# Patient Record
Sex: FEMALE | Race: WHITE | Employment: FULL TIME | URBAN - METROPOLITAN AREA
[De-identification: names, ages, dates, MRNs, and addresses within clinical notes are randomized per-mention and may not be internally consistent; named-entity substitution may affect disease eponyms.]

---

## 2021-02-08 ENCOUNTER — HOSPITAL ENCOUNTER (OUTPATIENT)
Dept: GENERAL RADIOLOGY | Age: 61
Discharge: HOME | End: 2021-02-08
Attending: ORTHOPAEDIC SURGERY
Payer: OTHER MISCELLANEOUS

## 2021-02-08 DIAGNOSIS — Z98.890 HISTORY OF OPEN REDUCTION AND INTERNAL FIXATION (ORIF) PROCEDURE: ICD-10-CM

## 2021-02-08 PROCEDURE — 73610 X-RAY EXAM OF ANKLE: CPT | Performed by: ORTHOPAEDIC SURGERY

## 2021-03-22 ENCOUNTER — HOSPITAL ENCOUNTER (OUTPATIENT)
Dept: GENERAL RADIOLOGY | Age: 61
Discharge: HOME | End: 2021-03-22
Attending: ORTHOPAEDIC SURGERY
Payer: OTHER MISCELLANEOUS

## 2021-03-22 DIAGNOSIS — M79.671 FOOT PAIN, RIGHT: ICD-10-CM

## 2021-03-22 PROCEDURE — 73610 X-RAY EXAM OF ANKLE: CPT | Performed by: ORTHOPAEDIC SURGERY

## 2024-07-18 ENCOUNTER — HOSPITAL ENCOUNTER (INPATIENT)
Facility: HOSPITAL | Age: 64
LOS: 4 days | Discharge: HOME | End: 2024-07-22
Attending: EMERGENCY MEDICINE | Admitting: HOSPITALIST
Payer: MEDICARE

## 2024-07-18 ENCOUNTER — APPOINTMENT (OUTPATIENT)
Dept: CT IMAGING | Age: 64
End: 2024-07-18
Attending: EMERGENCY MEDICINE
Payer: MEDICARE

## 2024-07-18 DIAGNOSIS — N93.9 VAGINAL BLEEDING: ICD-10-CM

## 2024-07-18 DIAGNOSIS — R10.9 ABDOMINAL PAIN OF UNKNOWN ETIOLOGY: ICD-10-CM

## 2024-07-18 DIAGNOSIS — D49.59 UTERINE NEOPLASM: ICD-10-CM

## 2024-07-18 DIAGNOSIS — R10.9 ABDOMINAL PAIN: ICD-10-CM

## 2024-07-18 DIAGNOSIS — N94.89 ENDOMETRIAL MASS: Primary | ICD-10-CM

## 2024-07-18 PROBLEM — N30.00 ACUTE CYSTITIS WITHOUT HEMATURIA: Status: ACTIVE | Noted: 2024-07-18

## 2024-07-18 PROBLEM — E87.1 HYPONATREMIA: Status: ACTIVE | Noted: 2024-07-18

## 2024-07-18 PROBLEM — R73.9 HYPERGLYCEMIA: Status: ACTIVE | Noted: 2024-07-18

## 2024-07-18 LAB
ALBUMIN SERPL-MCNC: 4.1 G/DL (ref 3.4–5)
ALBUMIN/GLOB SERPL: 1.1 {RATIO} (ref 1–2)
ALP LIVER SERPL-CCNC: 114 U/L
ALT SERPL-CCNC: 27 U/L
ANION GAP SERPL CALC-SCNC: 4 MMOL/L (ref 0–18)
ANTIBODY SCREEN: NEGATIVE
AST SERPL-CCNC: 25 U/L (ref 15–37)
BASOPHILS # BLD AUTO: 0.05 X10(3) UL (ref 0–0.2)
BASOPHILS NFR BLD AUTO: 0.6 %
BILIRUB SERPL-MCNC: 0.5 MG/DL (ref 0.1–2)
BILIRUB UR QL CFM: NEGATIVE
BUN BLD-MCNC: 15 MG/DL (ref 9–23)
CALCIUM BLD-MCNC: 9.5 MG/DL (ref 8.5–10.1)
CANCER AG125 SERPL-ACNC: 25 U/ML (ref ?–30.2)
CHLORIDE SERPL-SCNC: 105 MMOL/L (ref 98–112)
CO2 SERPL-SCNC: 25 MMOL/L (ref 21–32)
CREAT BLD-MCNC: 0.81 MG/DL
EGFRCR SERPLBLD CKD-EPI 2021: 81 ML/MIN/1.73M2 (ref 60–?)
EOSINOPHIL # BLD AUTO: 0.08 X10(3) UL (ref 0–0.7)
EOSINOPHIL NFR BLD AUTO: 1 %
ERYTHROCYTE [DISTWIDTH] IN BLOOD BY AUTOMATED COUNT: 13.2 %
EST. AVERAGE GLUCOSE BLD GHB EST-MCNC: 143 MG/DL (ref 68–126)
GLOBULIN PLAS-MCNC: 3.8 G/DL (ref 2.8–4.4)
GLUCOSE BLD-MCNC: 141 MG/DL (ref 70–99)
GLUCOSE BLD-MCNC: 215 MG/DL (ref 70–99)
GLUCOSE UR STRIP.AUTO-MCNC: NEGATIVE MG/DL
HBA1C MFR BLD: 6.6 % (ref ?–5.7)
HCT VFR BLD AUTO: 43.7 %
HGB BLD-MCNC: 14.2 G/DL
IMM GRANULOCYTES # BLD AUTO: 0.03 X10(3) UL (ref 0–1)
IMM GRANULOCYTES NFR BLD: 0.4 %
LEUKOCYTE ESTERASE UR QL STRIP.AUTO: NEGATIVE
LYMPHOCYTES # BLD AUTO: 1.8 X10(3) UL (ref 1–4)
LYMPHOCYTES NFR BLD AUTO: 22.1 %
MCH RBC QN AUTO: 29.3 PG (ref 26–34)
MCHC RBC AUTO-ENTMCNC: 32.5 G/DL (ref 31–37)
MCV RBC AUTO: 90.3 FL
MONOCYTES # BLD AUTO: 0.64 X10(3) UL (ref 0.1–1)
MONOCYTES NFR BLD AUTO: 7.8 %
NEUTROPHILS # BLD AUTO: 5.56 X10 (3) UL (ref 1.5–7.7)
NEUTROPHILS # BLD AUTO: 5.56 X10(3) UL (ref 1.5–7.7)
NEUTROPHILS NFR BLD AUTO: 68.1 %
NITRITE UR QL STRIP.AUTO: POSITIVE
OSMOLALITY SERPL CALC.SUM OF ELEC: 281 MOSM/KG (ref 275–295)
PH UR STRIP.AUTO: 5 [PH] (ref 5–8)
PLATELET # BLD AUTO: 314 10(3)UL (ref 150–450)
POTASSIUM SERPL-SCNC: 4.2 MMOL/L (ref 3.5–5.1)
PROT SERPL-MCNC: 7.9 G/DL (ref 6.4–8.2)
PROT UR STRIP.AUTO-MCNC: >=300 MG/DL
RBC # BLD AUTO: 4.84 X10(6)UL
RBC #/AREA URNS AUTO: >10 /HPF
RH BLOOD TYPE: POSITIVE
RH BLOOD TYPE: POSITIVE
SODIUM SERPL-SCNC: 134 MMOL/L (ref 136–145)
SP GR UR STRIP.AUTO: 1.02 (ref 1–1.03)
UROBILINOGEN UR STRIP.AUTO-MCNC: 1 MG/DL
WBC # BLD AUTO: 8.2 X10(3) UL (ref 4–11)

## 2024-07-18 PROCEDURE — 99223 1ST HOSP IP/OBS HIGH 75: CPT | Performed by: HOSPITALIST

## 2024-07-18 PROCEDURE — 74177 CT ABD & PELVIS W/CONTRAST: CPT | Performed by: EMERGENCY MEDICINE

## 2024-07-18 RX ORDER — CARVEDILOL 3.12 MG/1
3.12 TABLET ORAL 2 TIMES DAILY WITH MEALS
COMMUNITY

## 2024-07-18 RX ORDER — HYDROMORPHONE HYDROCHLORIDE 1 MG/ML
0.4 INJECTION, SOLUTION INTRAMUSCULAR; INTRAVENOUS; SUBCUTANEOUS EVERY 2 HOUR PRN
Status: DISCONTINUED | OUTPATIENT
Start: 2024-07-18 | End: 2024-07-18

## 2024-07-18 RX ORDER — NICOTINE POLACRILEX 4 MG
30 LOZENGE BUCCAL
Status: DISCONTINUED | OUTPATIENT
Start: 2024-07-18 | End: 2024-07-22

## 2024-07-18 RX ORDER — DEXTROSE MONOHYDRATE 25 G/50ML
50 INJECTION, SOLUTION INTRAVENOUS
Status: DISCONTINUED | OUTPATIENT
Start: 2024-07-18 | End: 2024-07-22

## 2024-07-18 RX ORDER — HYDROMORPHONE HYDROCHLORIDE 1 MG/ML
0.5 INJECTION, SOLUTION INTRAMUSCULAR; INTRAVENOUS; SUBCUTANEOUS EVERY 30 MIN PRN
Status: DISCONTINUED | OUTPATIENT
Start: 2024-07-18 | End: 2024-07-18 | Stop reason: ALTCHOICE

## 2024-07-18 RX ORDER — HYDROMORPHONE HYDROCHLORIDE 1 MG/ML
0.8 INJECTION, SOLUTION INTRAMUSCULAR; INTRAVENOUS; SUBCUTANEOUS EVERY 2 HOUR PRN
Status: DISCONTINUED | OUTPATIENT
Start: 2024-07-18 | End: 2024-07-22

## 2024-07-18 RX ORDER — HYDROMORPHONE HYDROCHLORIDE 1 MG/ML
0.2 INJECTION, SOLUTION INTRAMUSCULAR; INTRAVENOUS; SUBCUTANEOUS EVERY 2 HOUR PRN
Status: DISCONTINUED | OUTPATIENT
Start: 2024-07-18 | End: 2024-07-18

## 2024-07-18 RX ORDER — LOSARTAN POTASSIUM 25 MG/1
25 TABLET ORAL DAILY
Status: DISCONTINUED | OUTPATIENT
Start: 2024-07-19 | End: 2024-07-22

## 2024-07-18 RX ORDER — HYDROMORPHONE HYDROCHLORIDE 1 MG/ML
0.8 INJECTION, SOLUTION INTRAMUSCULAR; INTRAVENOUS; SUBCUTANEOUS EVERY 2 HOUR PRN
Status: DISCONTINUED | OUTPATIENT
Start: 2024-07-18 | End: 2024-07-18

## 2024-07-18 RX ORDER — BENZONATATE 100 MG/1
200 CAPSULE ORAL 3 TIMES DAILY PRN
Status: DISCONTINUED | OUTPATIENT
Start: 2024-07-18 | End: 2024-07-22

## 2024-07-18 RX ORDER — CARVEDILOL 3.12 MG/1
3.12 TABLET ORAL 2 TIMES DAILY WITH MEALS
Status: DISCONTINUED | OUTPATIENT
Start: 2024-07-19 | End: 2024-07-22

## 2024-07-18 RX ORDER — MELATONIN
3 NIGHTLY PRN
Status: DISCONTINUED | OUTPATIENT
Start: 2024-07-18 | End: 2024-07-22

## 2024-07-18 RX ORDER — SENNOSIDES 8.6 MG
17.2 TABLET ORAL NIGHTLY PRN
Status: DISCONTINUED | OUTPATIENT
Start: 2024-07-18 | End: 2024-07-22

## 2024-07-18 RX ORDER — SODIUM CHLORIDE 9 MG/ML
INJECTION, SOLUTION INTRAVENOUS CONTINUOUS
Status: ACTIVE | OUTPATIENT
Start: 2024-07-18 | End: 2024-07-18

## 2024-07-18 RX ORDER — ASPIRIN 81 MG/1
81 TABLET, CHEWABLE ORAL DAILY
COMMUNITY

## 2024-07-18 RX ORDER — SODIUM CHLORIDE 9 MG/ML
INJECTION, SOLUTION INTRAVENOUS CONTINUOUS
Status: DISCONTINUED | OUTPATIENT
Start: 2024-07-19 | End: 2024-07-22

## 2024-07-18 RX ORDER — BISACODYL 10 MG
10 SUPPOSITORY, RECTAL RECTAL
Status: DISCONTINUED | OUTPATIENT
Start: 2024-07-18 | End: 2024-07-22

## 2024-07-18 RX ORDER — ONDANSETRON 2 MG/ML
4 INJECTION INTRAMUSCULAR; INTRAVENOUS ONCE
Status: COMPLETED | OUTPATIENT
Start: 2024-07-18 | End: 2024-07-18

## 2024-07-18 RX ORDER — LOSARTAN POTASSIUM 25 MG/1
25 TABLET ORAL DAILY
COMMUNITY

## 2024-07-18 RX ORDER — HYDROCODONE BITARTRATE AND ACETAMINOPHEN 5; 325 MG/1; MG/1
1 TABLET ORAL EVERY 4 HOURS PRN
Status: DISCONTINUED | OUTPATIENT
Start: 2024-07-18 | End: 2024-07-20

## 2024-07-18 RX ORDER — ONDANSETRON 2 MG/ML
4 INJECTION INTRAMUSCULAR; INTRAVENOUS EVERY 6 HOURS PRN
Status: DISCONTINUED | OUTPATIENT
Start: 2024-07-18 | End: 2024-07-22

## 2024-07-18 RX ORDER — HYDROMORPHONE HYDROCHLORIDE 1 MG/ML
0.4 INJECTION, SOLUTION INTRAMUSCULAR; INTRAVENOUS; SUBCUTANEOUS EVERY 2 HOUR PRN
Status: DISCONTINUED | OUTPATIENT
Start: 2024-07-18 | End: 2024-07-22

## 2024-07-18 RX ORDER — NICOTINE POLACRILEX 4 MG
15 LOZENGE BUCCAL
Status: DISCONTINUED | OUTPATIENT
Start: 2024-07-18 | End: 2024-07-22

## 2024-07-18 RX ORDER — HYDROMORPHONE HYDROCHLORIDE 1 MG/ML
0.2 INJECTION, SOLUTION INTRAMUSCULAR; INTRAVENOUS; SUBCUTANEOUS EVERY 2 HOUR PRN
Status: DISCONTINUED | OUTPATIENT
Start: 2024-07-18 | End: 2024-07-22

## 2024-07-18 RX ORDER — ACETAMINOPHEN 325 MG/1
650 TABLET ORAL EVERY 4 HOURS PRN
Status: DISCONTINUED | OUTPATIENT
Start: 2024-07-18 | End: 2024-07-20

## 2024-07-18 RX ORDER — HYDROMORPHONE HYDROCHLORIDE 1 MG/ML
0.5 INJECTION, SOLUTION INTRAMUSCULAR; INTRAVENOUS; SUBCUTANEOUS EVERY 30 MIN PRN
Status: DISCONTINUED | OUTPATIENT
Start: 2024-07-18 | End: 2024-07-21

## 2024-07-18 RX ORDER — POLYETHYLENE GLYCOL 3350 17 G/17G
17 POWDER, FOR SOLUTION ORAL DAILY PRN
Status: DISCONTINUED | OUTPATIENT
Start: 2024-07-18 | End: 2024-07-22

## 2024-07-18 RX ORDER — ACETAMINOPHEN 500 MG
500 TABLET ORAL EVERY 4 HOURS PRN
Status: DISCONTINUED | OUTPATIENT
Start: 2024-07-18 | End: 2024-07-20

## 2024-07-18 RX ORDER — ONDANSETRON 2 MG/ML
4 INJECTION INTRAMUSCULAR; INTRAVENOUS EVERY 4 HOURS PRN
Status: DISCONTINUED | OUTPATIENT
Start: 2024-07-18 | End: 2024-07-18 | Stop reason: ALTCHOICE

## 2024-07-18 RX ORDER — ECHINACEA PURPUREA EXTRACT 125 MG
1 TABLET ORAL
Status: DISCONTINUED | OUTPATIENT
Start: 2024-07-18 | End: 2024-07-22

## 2024-07-18 RX ORDER — HYDROCODONE BITARTRATE AND ACETAMINOPHEN 5; 325 MG/1; MG/1
2 TABLET ORAL EVERY 4 HOURS PRN
Status: DISCONTINUED | OUTPATIENT
Start: 2024-07-18 | End: 2024-07-20

## 2024-07-18 NOTE — H&P
[facility]     Rosario Benitez Patient Status:  Inpatient    10/2/1960 MRN [id number]   Location [facility] 7NE-A Attending Darrick Jonas MD   Hosp Day # 0 PCP Petar Campbell MD     Chief Complaint: vaginal bleeding    Subjective:    History of Present Illness:     Rosario Benitez is a 63 year old female with several days (since Monday) of vaginal bleeding with clots.  Also has had itching, urinary frequency, urinary pressure, and urgency.  No fevers or chills.  Was having lower pelvic pain too but it's now resolved.  Transferred here from [facility].    History/Other:    Past Medical History:  Past Medical History:    Diabetes (HCC)    Essential hypertension    Heart attack (HCC)     Past Surgical History:   Past Surgical History:   Procedure Laterality Date    Anesth,radical leg bone surgery        Family History:   No family history on file.    hypertension Social History:    reports that she has quit smoking. Her smoking use included cigarettes. She has never used smokeless tobacco. She reports current alcohol use. She reports that she does not use drugs.     Allergies: No Known Allergies    Medications:    No current facility-administered medications on file prior to encounter.     Current Outpatient Medications on File Prior to Encounter   Medication Sig Dispense Refill    metFORMIN 500 MG Oral Tab Take 1 tablet (500 mg total) by mouth 2 (two) times daily with meals.      carvedilol 3.125 MG Oral Tab Take 1 tablet (3.125 mg total) by mouth 2 (two) times daily with meals.      losartan 25 MG Oral Tab Take 1 tablet (25 mg total) by mouth daily.         Review of Systems:   A comprehensive 12 point review of systems was completed.    Pertinent positives and negatives noted in the HPI.    Objective:   Physical Exam:    /67   Pulse 71   Temp 97.6 °F (36.4 °C)   Resp 12   Ht 5' 2\" (1.575 m)   Wt 155 lb (70.3 kg)   SpO2 98%   BMI 28.35 kg/m²   General: No  acute distress, Alert  Respiratory: No rhonchi, no wheezes  Cardiovascular: S1, S2. Regular rate and rhythm  Abdomen: Soft, NT/ND, +BS  Neuro: No new focal deficits  Extremities: No edema      Results:    Labs:      Labs Last 24 Hours:    Recent Labs   Lab 07/18/24  1154   RBC 4.84   HGB 14.2   HCT 43.7   MCV 90.3   MCH 29.3   MCHC 32.5   RDW 13.2   NEPRELIM 5.56   WBC 8.2   .0       Recent Labs   Lab 07/18/24  1154   *   BUN 15   CREATSERUM 0.81   EGFRCR 81   CA 9.5   ALB 4.1   *   K 4.2      CO2 25.0   ALKPHO 114   AST 25   ALT 27   BILT 0.5   TP 7.9       No results found for: \"PT\", \"INR\"    No results for input(s): \"TROP\", \"TROPHS\", \"CK\" in the last 168 hours.    No results for input(s): \"TROP\", \"PBNP\" in the last 168 hours.    No results for input(s): \"PCT\" in the last 168 hours.    Imaging: Imaging data reviewed in Epic.    Assessment & Plan:      #vaginal bleeding 2/2 pelvic mass; dr. Chavarria/ladan on consult  #UTI-empiric abx; monitor cultures  #mild hyponatremia-monitor  #CAD with hx MI but has no stents  #DMII-monitor on insulin  #HTN    Will do med rec once review complete.    Quality:  DVT prophylaxis:  SCDs  Code Status: see chart  Peterson: NO  Peterson Duration (in days): N/A  Central line: NO  Estimated discharge date: tbd    Plan of care discussed with patient and ER MD Darrick Mancini MD    Supplementary Documentation:

## 2024-07-18 NOTE — ED QUICK NOTES
Orders for admission, patient is aware of plan and ready to go upstairs. Any questions, please call ED RN Jennifer at extension [phone].     Patient Covid vaccination status: Fully vaccinated     COVID Test Ordered in ED: None    COVID Suspicion at Admission: N/A    Running Infusions:      Mental Status/LOC at time of transport: a&oX3    Other pertinent information:   CIWA score: N/A   NIH score:  N/A

## 2024-07-18 NOTE — ED INITIAL ASSESSMENT (HPI)
Right sided abd pain with vaginal bleeding onset 2 days ago, today started heavy bleeding with passing of clots and states is now wearing a diaper and a pad at present time.

## 2024-07-18 NOTE — PLAN OF CARE
Assumed care @1800  A&Ox4, RA, NSR  Denies pain at this time  Up with brace on ankle  Discussed POC with patient and family. All questions answered.

## 2024-07-18 NOTE — ED PROVIDER NOTES
Patient Seen in: [facility] In [place]      History     Chief Complaint   Patient presents with    Eval-G     Stated Complaint: vaginal bleeding with blood clots since Monday, r sided pelvic pain and pressure    Subjective:   HPI    This is a 63-year-old female who presents with vaginal bleeding with clots since Monday right-sided pelvic pain and pressure.  The patient arrives here with complaints of pressure in her right side of her abdomen she said she had some blood spotting on Monday and it just gotten worse.  She did notice some clots in the her pelvic area.  She has had no fever no diarrhea no dysuria.  But after she vomited she said they noticed a rash in her back she has no abdominal pain in the upper abdomen to the lower abdomen.  Is diffusely tender.  And she started having more abdominal pain, vomiting and so she came in here she is did not use any pads because she says just spots with clots.  Previous history of diabetes hypertension previous history of heart attack.     Objective:   Past Medical History:    Diabetes (HCC)    Essential hypertension    Heart attack (HCC)              Past Surgical History:   Procedure Laterality Date    Anesth,radical leg bone surgery                  Social History     Socioeconomic History    Marital status: Single   Tobacco Use    Smoking status: Former     Types: Cigarettes    Smokeless tobacco: Never   Substance and Sexual Activity    Alcohol use: Yes    Drug use: Never              Review of Systems    Positive for stated Chief Complaint: Eval-G    Other systems are as noted in HPI.  Constitutional and vital signs reviewed.      All other systems reviewed and negative except as noted above.    Physical Exam     ED Triage Vitals   BP 07/18/24 1150 (!) 162/84   Pulse 07/18/24 1150 86   Resp 07/18/24 1150 18   Temp 07/18/24 1150 97.6 °F (36.4 °C)   Temp src 07/18/24 1323 Oral   SpO2 07/18/24 1150 97 %   O2 Device 07/18/24 1150 None (Room air)        Current Vitals:   Vital Signs  BP: 149/78  Pulse: 66  Resp: 18  Temp: 97.6 °F (36.4 °C)  Temp src: Oral    Oxygen Therapy  SpO2: 97 %  O2 Device: None (Room air)            Physical Exam       General: .  Is in no respiratory distress tender in the lower abdomen right greater than left.   The patient is in no respiratory distress. The patient is not septic or toxic    HEENT: Atraumatic, conjunctiva are not pale.  There is no icterus.  Oral mucosa Is wet. The neck is supple. There is no meningismus.    LUNGS: Clear to auscultation, there is no wheezing or retraction.  No crackles.    CV: Cardiovascular is regular without murmurs or rubs.    ABD: The abdomen is soft nondistended tender in the right lower quadrant, suprapubic area.  There is no rebound.  There is no guarding.  Bowel sounds are present.    EXT: There is good pulses bilaterally.  There is no calf tenderness.  There is no rash noted.  There is no edema.  There is a rash in her back that is kind of discolored darker suggesting almost a petechial rash.  The family states it was not there but it just darted after she vomited.    NEURO: Alert and oriented x3.  Patient is neurologically intact moving all extremities  ED Course     Labs Reviewed   URINALYSIS WITH CULTURE REFLEX - Abnormal; Notable for the following components:       Result Value    Urine Color Red (*)     Clarity Urine Cloudy (*)     Ketones Urine Trace (*)     Blood Urine Large (*)     Protein Urine >=300 (*)     Urobilinogen Urine 1.0 (*)     Nitrite Urine Positive (*)     All other components within normal limits   COMP METABOLIC PANEL (14) - Abnormal; Notable for the following components:    Glucose 141 (*)     Sodium 134 (*)     All other components within normal limits   UA MICROSCOPIC ONLY, URINE - Abnormal; Notable for the following components:    RBC Urine >10 (*)     Bacteria Urine 1+ (*)     All other components within normal limits   ICTOTEST - Normal   CBC WITH DIFFERENTIAL  WITH PLATELET    Narrative:     The following orders were created for panel order CBC With Differential With Platelet.  Procedure                               Abnormality         Status                     ---------                               -----------         ------                     CBC W/ DIFFERENTIAL[[id number]]                              Final result                 Please view results for these tests on the individual orders.   ABORH (BLOOD TYPE)   TYPE AND SCREEN    Narrative:     The following orders were created for panel order Type and screen.  Procedure                               Abnormality         Status                     ---------                               -----------         ------                     ABORH (Blood Type)[[id number]]                               Final result               Antibody Screen[[id number]]                                  In process                   Please view results for these tests on the individual orders.   ANTIBODY SCREEN   RAINBOW DRAW LIGHT GREEN   RAINBOW DRAW LAVENDER   URINE CULTURE, ROUTINE   CBC W/ DIFFERENTIAL             The patient was placed on monitors, IV was started, blood was drawn.     Workup was doing no done to rule out obstruction, perforation.    MDM           The patient CBC shows a white count 8.2, hemoglobin 14, platelet was 314           Patient's comprehensive shows a sodium of 134, CBC was grossly negative.  Urinalysis did show positive nitrates but there was no significant white cells and greater than 10 red blood cells..  She did get a pelvic exam and there was some blood and clots that we did remove but beyond that it does show to be what seems to be is endometrial mass noted.     I personally reviewed the radiographs and my individual interpretation shows    Endometrial mass noted.  No perforation, obstruction.    Also reviewed official report and it shows     CT ABDOMEN+PELVIS(CONTRAST ONLY)(CPT=74177)    Result Date:  7/18/2024  PROCEDURE:  CT ABDOMEN+PELVIS (CONTRAST ONLY) (CPT=74177)  COMPARISON:  None.  INDICATIONS:  vaginal bleeding with blood clots since Monday, r sided pelvic pain and pressure  TECHNIQUE:  CT scanning was performed from the dome of the diaphragm to the pubic symphysis with non-ionic intravenous contrast material. Post contrast coronal MPR imaging was performed.  Dose reduction techniques were used. Dose information is transmitted to the ACR (American College of Radiology) NRDR (National Radiology Data Registry) which includes the Dose Index Registry.  PATIENT STATED HISTORY:(As transcribed by Technologist)  Patient has had right sided abdominal pain and vaginal bleeding for 2 days.   CONTRAST USED:  100cc of Isovue 370  FINDINGS:  LIVER:  No enlargement, atrophy, abnormal density, or significant focal lesion.  Incidental hepatic cysts are noted of which the largest cyst in the left hepatic lobe measures up to 1.8 x 1.4 cm. BILIARY:  No visible dilatation or calcification.  PANCREAS:  No lesion, fluid collection, ductal dilatation, or atrophy.  SPLEEN:  No enlargement or focal lesion.  KIDNEYS:  No mass, obstruction, or calcification.  ADRENALS:  Nodular thickening of the left adrenal gland is noted.  The right adrenal gland is within normal limits.  AORTA/VASCULAR:  No aneurysm or dissection.  RETROPERITONEUM:  No mass or adenopathy.  BOWEL/MESENTERY:  Moderate hiatal hernia is noted.  No visible mass, obstruction, or bowel wall thickening.  Appendix is normal. ABDOMINAL WALL:  No mass or hernia.  URINARY BLADDER:  No visible focal wall thickening, lesion, or calculus.  PELVIC NODES:  No adenopathy.  PELVIC ORGANS:  A low-attenuation collection versus mass centered in the endometrial canal is noted measuring up to 8.6 x 7.2 x 11.2 cm.  The collection/mass extends into the lower vaginal canal.  This may represent a necrotic abscess versus mass in the endometrial canal..  BONES:  No bony lesion or fracture.   LUNG BASES:  No visible pulmonary or pleural disease.  OTHER:  Negative.             CONCLUSION:  1. Low-attenuation heterogeneous lesion in the endometrial canal extending into the vaginal canal may represent an abscess versus necrotic mass.  Clinical correlation is advised   LOCATION:  [facility]   Dictated by (CST): Barrett España MD on 7/18/2024 at 1:18 PM     Finalized by (CST): Barrett España MD on 7/18/2024 at 1:23 PM        Patient was reexamined abdomen .  Findings of mild diffuse tenderness throughout the lower abdomen.  Discussed with her daughter who was in the room that the patient will need to be admitted.  They do not have a doctor on staff.  Pelvic exam was done with a female nurse as a chaperone.  She did show what seems to be beside.  Behind all the blood but seems to be is a   Endometrial mass        Patient's been cardiovascular stable here patient case will be discussed with GYN.  Patient will need to be admitted for pain control and further evaluation.  The patient's case was discussed with the PA covering for          Oncology surgery who stated that they will take the patient in consultation.  With the hospitalist to admit.  Added by the hospitalist.  Medical Decision Making      Disposition and Plan     Clinical Impression:  1. Abdominal pain of unknown etiology    2. Vaginal bleeding    3. Endometrial mass         Disposition:  Admit  7/18/2024  2:34 pm    Follow-up:  No follow-up provider specified.        Medications Prescribed:  Current Discharge Medication List                            Hospital Problems       Present on Admission             ICD-10-CM Noted POA    * (Principal) Abdominal pain of unknown etiology R10.9 7/18/2024 Unknown    Hyperglycemia R73.9 7/18/2024 Yes    Hyponatremia E87.1 7/18/2024 Yes

## 2024-07-19 ENCOUNTER — APPOINTMENT (OUTPATIENT)
Dept: CT IMAGING | Facility: HOSPITAL | Age: 64
End: 2024-07-19
Payer: MEDICARE

## 2024-07-19 DIAGNOSIS — R10.9 ABDOMINAL PAIN: Primary | ICD-10-CM

## 2024-07-19 LAB
ANION GAP SERPL CALC-SCNC: 6 MMOL/L (ref 0–18)
BUN BLD-MCNC: 7 MG/DL (ref 9–23)
CALCIUM BLD-MCNC: 8.8 MG/DL (ref 8.7–10.4)
CHLORIDE SERPL-SCNC: 109 MMOL/L (ref 98–112)
CO2 SERPL-SCNC: 24 MMOL/L (ref 21–32)
CREAT BLD-MCNC: 0.58 MG/DL
EGFRCR SERPLBLD CKD-EPI 2021: 100 ML/MIN/1.73M2 (ref 60–?)
ERYTHROCYTE [DISTWIDTH] IN BLOOD BY AUTOMATED COUNT: 13.2 %
GLUCOSE BLD-MCNC: 119 MG/DL (ref 70–99)
GLUCOSE BLD-MCNC: 136 MG/DL (ref 70–99)
GLUCOSE BLD-MCNC: 150 MG/DL (ref 70–99)
GLUCOSE BLD-MCNC: 160 MG/DL (ref 70–99)
GLUCOSE BLD-MCNC: 218 MG/DL (ref 70–99)
HCT VFR BLD AUTO: 35.6 %
HCT VFR BLD AUTO: 36.9 %
HGB BLD-MCNC: 11.8 G/DL
HGB BLD-MCNC: 12.3 G/DL
MCH RBC QN AUTO: 30.2 PG (ref 26–34)
MCHC RBC AUTO-ENTMCNC: 33.3 G/DL (ref 31–37)
MCV RBC AUTO: 90.7 FL
OSMOLALITY SERPL CALC.SUM OF ELEC: 288 MOSM/KG (ref 275–295)
PLATELET # BLD AUTO: 227 10(3)UL (ref 150–450)
POTASSIUM SERPL-SCNC: 3.9 MMOL/L (ref 3.5–5.1)
RBC # BLD AUTO: 4.07 X10(6)UL
SODIUM SERPL-SCNC: 139 MMOL/L (ref 136–145)
WBC # BLD AUTO: 8.9 X10(3) UL (ref 4–11)

## 2024-07-19 PROCEDURE — 3078F DIAST BP <80 MM HG: CPT | Performed by: HOSPITALIST

## 2024-07-19 PROCEDURE — 99232 SBSQ HOSP IP/OBS MODERATE 35: CPT | Performed by: HOSPITALIST

## 2024-07-19 PROCEDURE — 71260 CT THORAX DX C+: CPT

## 2024-07-19 PROCEDURE — 3008F BODY MASS INDEX DOCD: CPT | Performed by: HOSPITALIST

## 2024-07-19 PROCEDURE — 3075F SYST BP GE 130 - 139MM HG: CPT | Performed by: HOSPITALIST

## 2024-07-19 RX ORDER — BENZOCAINE/MENTHOL 6 MG-10 MG
LOZENGE MUCOUS MEMBRANE 2 TIMES DAILY
Status: DISCONTINUED | OUTPATIENT
Start: 2024-07-19 | End: 2024-07-22

## 2024-07-19 NOTE — PROGRESS NOTES
[facility]   part of [facility]     Hospitalist Progress Note     Rosario Benitez Patient Status:  Inpatient    10/2/1960 MRN [id number]   Location [facility] 7NE-A Attending Maximiliano Birch MD   Hosp Day # 1 PCP Petar Campbell MD     Chief Complaint: vaginal bleeding    Subjective:     Patient feels well today.  Has not noticed much pain, no more bleeding.  Denies fever, chills, n/v.  No other acute complaints .     Objective:    Review of Systems:   A comprehensive review of systems was completed; pertinent positive and negatives stated in subjective.    Vital signs:  Temp:  [97.2 °F (36.2 °C)-98.7 °F (37.1 °C)] 98.7 °F (37.1 °C)  Pulse:  [66-93] 76  Resp:  [12-23] 18  BP: (116-162)/(59-84) 135/66  SpO2:  [97 %-98 %] 98 %    Physical Exam:    General: No acute distress, pleasant   Respiratory: No wheezes, no rhonchi  Cardiovascular: S1, S2, regular rate and rhythm  Abdomen: Soft, Non-tender, non-distended, positive bowel sounds  Neuro: No new focal deficits.   Extremities: No edema    Diagnostic Data:    Labs:  Recent Labs   Lab 24  1154 24  0806   WBC 8.2 8.9   HGB 14.2 12.3   MCV 90.3 90.7   .0 227.0       Recent Labs   Lab 24  1154   *   BUN 15   CREATSERUM 0.81   CA 9.5   ALB 4.1   *   K 4.2      CO2 25.0   ALKPHO 114   AST 25   ALT 27   BILT 0.5   TP 7.9       Estimated Creatinine Clearance: 54.8 mL/min (based on SCr of 0.81 mg/dL).    No results for input(s): \"TROP\", \"TROPHS\", \"CK\" in the last 168 hours.    No results for input(s): \"PTP\", \"INR\" in the last 168 hours.               Microbiology    No results found for this visit on 24.      Imaging: Reviewed in Epic.    Medications:    cefTRIAXone  1 g Intravenous Q24H    insulin aspart  1-5 Units Subcutaneous TID AC and HS    carvedilol  3.125 mg Oral BID with meals    losartan  25 mg Oral Daily       Assessment & Plan:      #Vaginal bleeding  #Pelvic mass;  Surgical oncology and ob/gyn  consult  Likely needs surgery    #UTI  Short course Abx  Cefazolin, 3 days total     #mild hyponatremia - resolved    #CAD with hx MI but has no stents    #DM Type 2  HgA1c of 6.6  ISS    #HTN - coreg, losartan       Maximiliano Birch MD    Supplementary Documentation:     Quality:  DVT Mechanical Prophylaxis:   SCDs,    DVT Pharmacologic Prophylaxis   Medication   None      DVT Pharmacologic prophylaxis: Aspirin 162 mg         Code Status: Not on file  Peterson: No urinary catheter in place  Peterson Duration (in days):   Central line:    HARVEY:     Discharge is dependent on: Surgical oncology   At this point Ms. Benitez is expected to be discharge to: Home    The 21st Century Cures Act makes medical notes like these available to patients in the interest of transparency. Please be advised this is a medical document. Medical documents are intended to carry relevant information, facts as evident, and the clinical opinion of the practitioner. The medical note is intended as peer to peer communication and may appear blunt or direct. It is written in medical language and may contain abbreviations or verbiage that are unfamiliar.             **Certification      PHYSICIAN Certification of Need for Inpatient Hospitalization - Initial Certification    Patient will require inpatient services that will reasonably be expected to span two midnight's based on the clinical documentation in H+P.   Based on patients current state of illness, I anticipate that, after discharge, patient will require TBD.

## 2024-07-19 NOTE — CONSULTS
[facility]      Patient Name:  Rosario Benitez   YOB: 1960   Gender:  Female   Appt Date:  7/19/2024   Provider:  Tex Chavarria MD     CHIEF COMPLAINT  Endometrial Mass  Vaginal Bleeding     PROBLEMS  Reviewed   Patient Active Problem List   Diagnosis    Abdominal pain of unknown etiology    Hyponatremia    Hyperglycemia    Vaginal bleeding    Endometrial mass    Acute cystitis without hematuria        History of Present Illness:  Patient is a 63 year old woman who we are currently being consulted for surgical oncology recommendations regarding a newly diagnosed endometrial/pelvic mass.     Patient reported to the ED after having new onset right pelvic pain with pressure and  vaginal spotting started on Monday. She started passing large clots that saturated pads, diapers and a towel which prompted her to report to the ED. CT A/P was performed in the ED which showed a low attenuation collection vs mass in the endometrial canal measuring 8.6 x 7.2 x 11.2 cm extending into the lower vaginal canal. Hgb WNL 14.2. CMP with mild hyponatremia.  was 25. Patient reports her last gynecologic exam was a long time ago. She confirms colonoscopy within the last 5 years and mammogram last year. She expresses that the vaginal bleeding has improved since being admitted. She denies personal history of cancer. She does have a daughter who was recently diagnosed with rectal cancer.        Vital Signs:  /60 (BP Location: Right arm)   Pulse 76   Temp 97.6 °F (36.4 °C) (Temporal)   Resp 18   Ht 1.575 m (5' 2\")   Wt 70.3 kg (155 lb)   SpO2 98%   BMI 28.35 kg/m²      Medications Reviewed:  No current facility-administered medications on file prior to encounter.     Current Outpatient Medications on File Prior to Encounter   Medication Sig Dispense Refill    metFORMIN 500 MG Oral Tab Take 1 tablet (500 mg total) by mouth 2 (two) times daily with meals.      carvedilol 3.125 MG Oral Tab  Take 1 tablet (3.125 mg total) by mouth 2 (two) times daily with meals.      losartan 25 MG Oral Tab Take 1 tablet (25 mg total) by mouth daily.      aspirin 81 MG Oral Chew Tab Chew 1 tablet (81 mg total) by mouth daily.          Allergies Reviewed:  No Known Allergies     History:  Reviewed:  Past Medical History:    Diabetes (HCC)    Essential hypertension    Heart attack (HCC)      Reviewed:  Past Surgical History:   Procedure Laterality Date    Anesth,radical leg bone surgery        Reviewed Social History:  Social History     Socioeconomic History    Marital status: Single   Tobacco Use    Smoking status: Former     Types: Cigarettes    Smokeless tobacco: Never   Substance and Sexual Activity    Alcohol use: Yes    Drug use: Never     Social Determinants of Health     Food Insecurity: No Food Insecurity (7/18/2024)    Food Insecurity     Food Insecurity: Never true   Transportation Needs: No Transportation Needs (7/18/2024)    Transportation Needs     Lack of Transportation: No   Housing Stability: Low Risk  (7/18/2024)    Housing Stability     Housing Instability: No      Reviewed:  No family history on file.     Review of Systems:  GENERAL HEALTH: feels well, no fatigue.   RESPIRATORY: denies shortness of breath  CARDIOVASCULAR: denies chest pain, SOB, edema,orthopnea, no palpitations   GI: denies nausea, vomiting, constipation, diarrhea; no rectal bleeding  GENITAL/: + vaginal bleeding and passing clots  MUSCULOSKELETAL: no joint complaints, no back pain  NEURO: no tingling, numbness, weakness  ENDOCRINE: denies weight loss/gain  PSYCH: anxious       Physical Examination:  Constitutional: NAD.   Eyes: Sclera: non-icteric.   Neck: Neck: supple.   Lymph Nodes: Lymph Nodes no cervical LAD, supraclavicular LAD, axillary LAD, or inguinal LAD.   Lungs: No increased work of breathing  Cardiovascular: Heart Auscultation: RRR on tele  Abdomen: Soft, non-tender, non-distended. No peritonitis or rigidity.    Musculoskeletal: Extremities: no edema.   Skin: Inspection and palpation: no jaundice.      Document Review:  CT A/P:  CONCLUSION:    1. Low-attenuation heterogeneous lesion in the endometrial canal extending into the vaginal canal may represent an abscess versus necrotic mass.  Clinical correlation is advised     Lab Results   Component Value Date    WBC 8.2 07/18/2024    HGB 14.2 07/18/2024    HCT 43.7 07/18/2024    .0 07/18/2024    CREATSERUM 0.81 07/18/2024    BUN 15 07/18/2024     07/18/2024    K 4.2 07/18/2024     07/18/2024    CO2 25.0 07/18/2024     07/18/2024    CA 9.5 07/18/2024    ALB 4.1 07/18/2024    ALKPHO 114 07/18/2024    BILT 0.5 07/18/2024    TP 7.9 07/18/2024    AST 25 07/18/2024    ALT 27 07/18/2024          Procedure(s):  None     Assessment / Plan:  Suspected endometrial mass extending into the vagina  - No acute surgical issues  - CT findings discussed with patient and her daughter. Differential including malignancy, options and recommendations were discussed with patient.   - Repeat CBC to ensure Hgb stable  - Gyne exam with possible biopsies later today.  -  WNL  - OK for diet from surg onc standpoint  - Continue current pain management  - Patient agreed and understood. Her and her daughter had ample time to ask questions.   - If Hgb stable and pain controlled, anticipate discharge after gyne exam, today or tomorrow.       Patient seen and examined with Dr Chavarria  Electronically Signed by: DALLAS Jesus    Attending:  I have reviewed the above listed note and evaluation by the physician assistant, Rosalva Mancilla.     I have personally seen and examined the patient with the PA and reviewed all relevant labs and reports. I agree with her physical exam and the data listed in the report.     I also agree with the above listed assessment and plan which I formulated.      Patient in no apparent distress.  Abdomen is soft and nontender.  I was not able to  appreciate masses.  GYN: Cervix effaced by necrotic tumor with clotting and oozing.  Portion of the tumor sent to pathology.  Findings, differential diagnosis, options, and recommendations were discussed.  More likely, patient will need surgery during this admission.  Discussed with patient and 2 of her daughters.    Care discussed as above    GEORGE YU MD

## 2024-07-19 NOTE — PLAN OF CARE
Assumed care at approx 0730.  Alert and oriented x4.  On room air, respirations even and unlabored.  NSR on tele.  Pain controlled with meds.  Consent signed for surgery tomorrow.  Safety precautions maintained, patient reports needs met, call light within reach.      Problem: Diabetes/Glucose Control  Goal: Glucose maintained within prescribed range  Description: INTERVENTIONS:  - Monitor Blood Glucose as ordered  - Assess for signs and symptoms of hyperglycemia and hypoglycemia  - Administer ordered medications to maintain glucose within target range  - Assess barriers to adequate nutritional intake and initiate nutrition consult as needed  - Instruct patient on self management of diabetes  Outcome: Progressing

## 2024-07-19 NOTE — PLAN OF CARE
Assumed care  IVF infusing.  Pain controlled with 1 norco overnight.  Plan for Surg onc consult today.  NPO at this time until plan decided.  Patient updated with POC.  Resting in bed. Care ongoing.

## 2024-07-19 NOTE — PAYOR COMM NOTE
--------------  ADMISSION REVIEW     Payor: CHAYA BARRERA  Subscriber #:  [id number]  Authorization Number: [id number]    Admit date: 7/18/24  Admit time:  5:59 PM     Patient Seen in: [facility] [place]    History     Stated Complaint: vaginal bleeding with blood clots since Monday, r sided pelvic pain and pressure    This is a 63-year-old female who presents with vaginal bleeding with clots since Monday right-sided pelvic pain and pressure.  The patient arrives here with complaints of pressure in her right side of her abdomen she said she had some blood spotting on Monday and it just gotten worse.  She did notice some clots in the her pelvic area.  She has had no fever no diarrhea no dysuria.  But after she vomited she said they noticed a rash in her back she has no abdominal pain in the upper abdomen to the lower abdomen.  Is diffusely tender.  And she started having more abdominal pain, vomiting and so she came in here she is did not use any pads because she says just spots with clots.  Previous history of diabetes hypertension previous history of heart attack.     Objective:   Past Medical History:    Diabetes (HCC)    Essential hypertension    Heart attack (HCC)     Past Surgical History:   Procedure Laterality Date    Anesth,radical leg bone surgery       Physical Exam     ED Triage Vitals   BP 07/18/24 1150 (!) 162/84   Pulse 07/18/24 1150 86   Resp 07/18/24 1150 18   Temp 07/18/24 1150 97.6 °F (36.4 °C)   Temp src 07/18/24 1323 Oral   SpO2 07/18/24 1150 97 %   O2 Device 07/18/24 1150 None (Room air)     Current Vitals:   Vital Signs  BP: 149/78  Pulse: 66  Resp: 18  Temp: 97.6 °F (36.4 °C)  Temp src: Oral    Physical Exam   General: .  Is in no respiratory distress tender in the lower abdomen right greater than left.   The patient is in no respiratory distress. The patient is not septic or toxic  HEENT: Atraumatic, conjunctiva are not pale.  There is no icterus.  Oral mucosa Is wet. The  neck is supple. There is no meningismus.  LUNGS: Clear to auscultation, there is no wheezing or retraction.  No crackles.  CV: Cardiovascular is regular without murmurs or rubs.  ABD: The abdomen is soft nondistended tender in the right lower quadrant, suprapubic area.  There is no rebound.  There is no guarding.  Bowel sounds are present.  EXT: There is good pulses bilaterally.  There is no calf tenderness.  There is no rash noted.  There is no edema.    There is a rash in her back that is kind of discolored darker suggesting almost a petechial rash.  The family states it was not there but it just darted after she vomited.  NEURO: Alert and oriented x3.  Patient is neurologically intact moving all extremities  ED Course     Labs Reviewed   URINALYSIS WITH CULTURE REFLEX - Abnormal; Notable for the following components:       Result Value    Urine Color Red (*)     Clarity Urine Cloudy (*)     Ketones Urine Trace (*)     Blood Urine Large (*)     Protein Urine >=300 (*)     Urobilinogen Urine 1.0 (*)     Nitrite Urine Positive (*)     All other components within normal limits   COMP METABOLIC PANEL (14) - Abnormal; Notable for the following components:    Glucose 141 (*)     Sodium 134 (*)     All other components within normal limits   UA MICROSCOPIC ONLY, URINE - Abnormal; Notable for the following components:    RBC Urine >10 (*)     Bacteria Urine 1+ (*)     All other components within normal limits   ICTOTEST - Normal   CBC WITH DIFFERENTIAL WITH PLATELET   URINE CULTURE, ROUTINE     The patient CBC shows a white count 8.2, hemoglobin 14, platelet was 314         Patient's comprehensive shows a sodium of 134, CBC was grossly negative.  Urinalysis did show positive nitrates but there was no significant white cells and greater than 10 red blood cells..    She did get a pelvic exam and there was some blood and clots that we did remove but beyond that it does show to be what seems to be is endometrial mass  noted.     CT ABDOMEN+PELVIS  1. Low-attenuation heterogeneous lesion in the endometrial canal extending into the vaginal canal may represent an abscess versus necrotic mass.  Clinical correlation is advised       Patient was reexamined abdomen .  Findings of mild diffuse tenderness throughout the lower abdomen.  Discussed with her daughter who was in the room that the patient will need to be admitted.  They do not have a doctor on staff.  Pelvic exam was done with a female nurse as a chaperone.  She did show what seems to be beside.  Behind all the blood but seems to be is a   Endometrial mass    Patient's been cardiovascular stable here patient case will be discussed with GYN.  Patient will need to be admitted for pain control and further evaluation.  The patient's case was discussed with the PA covering for    Oncology surgery who stated that they will take the patient in consultation.  With the hospitalist to admit.  Added by the hospitalist.  Disposition and Plan     Clinical Impression:  1. Abdominal pain of unknown etiology    2. Vaginal bleeding    3. Endometrial mass          History and Physical      Rosario Benitez is a 63 year old female with several days (since Monday) of vaginal bleeding with clots.  Also has had itching, urinary frequency, urinary pressure, and urgency.  No fevers or chills.  Was having lower pelvic pain too but it's now resolved.  Transferred here from [facility].       Objective:  Physical Exam:    /67   Pulse 71   Temp 97.6 °F (36.4 °C)   Resp 12   Ht 5' 2\" (1.575 m)   Wt 155 lb (70.3 kg)   SpO2 98%   BMI 28.35 kg/m²   General: No acute distress, Alert  Respiratory: No rhonchi, no wheezes  Cardiovascular: S1, S2. Regular rate and rhythm  Abdomen: Soft, NT/ND, +BS  Neuro: No new focal deficits  Extremities: No edema        Lab 07/18/24  1154   RBC 4.84   HGB 14.2   HCT 43.7   MCV 90.3   MCH 29.3   MCHC 32.5   RDW 13.2   NEPRELIM 5.56   WBC 8.2   .0      GLU  141*   BUN 15   CREATSERUM 0.81   EGFRCR 81   CA 9.5   ALB 4.1   *   K 4.2      CO2 25.0   ALKPHO 114   AST 25   ALT 27   BILT 0.5   TP 7.9       Assessment & Plan:  #vaginal bleeding 2/2 pelvic mass; dr. Chavarria/ladan on consult  #UTI-empiric abx; monitor cultures  #mild hyponatremia-monitor  #CAD with hx MI but has no stents  #DMII-monitor on insulin  #HTN      7/19:    SURGICAL ONCOLOGY:    Patient is a 63 year old woman who we are currently being consulted for surgical oncology recommendations regarding a newly diagnosed endometrial/pelvic mass.      Patient reported to the ED after having new onset right pelvic pain with pressure and  vaginal spotting started on Monday. She started passing large clots that saturated pads, diapers and a towel which prompted her to report to the ED. CT A/P was performed in the ED which showed a low attenuation collection vs mass in the endometrial canal measuring 8.6 x 7.2 x 11.2 cm extending into the lower vaginal canal. Hgb WNL 14.2. CMP with mild hyponatremia.  was 25. Patient reports her last gynecologic exam was a long time ago. She confirms colonoscopy within the last 5 years and mammogram last year. She expresses that the vaginal bleeding has improved since being admitted. She denies personal history of cancer. She does have a daughter who was recently diagnosed with rectal cancer.          Vital Signs:  /60 (BP Location: Right arm)   Pulse 76   Temp 97.6 °F (36.4 °C) (Temporal)   Resp 18   Ht 1.575 m (5' 2\")   Wt 70.3 kg (155 lb)   SpO2 98%   BMI 28.35 kg/m²      Physical Examination:  Constitutional: General Appearance: healthy-appearing, well-nourished, and well-developed. Level of Distress: NAD.   Eyes: Sclera: non-icteric.   Neck: Neck: supple.   Lymph Nodes: Lymph Nodes no cervical LAD, supraclavicular LAD, axillary LAD, or inguinal LAD.   Lungs: No increased work of breathing  Cardiovascular: Heart Auscultation: RRR on tele  Abdomen:  Soft, non-tender, non-distended. No peritonitis or rigidity.   Musculoskeletal: Extremities: no edema.   Skin: Inspection and palpation: no jaundice.      Assessment / Plan:  Suspected endometrial mass extending into the vagina  - No acute surgical issues  - CT findings discussed with patient and her daughter. Differential including malignancy, options and recommendations were discussed with patient.   - Repeat CBC to ensure Hgb stable  - Gyne exam with possible biopsies later today.  -  WNL  - OK for diet from surg onc standpoint  - Continue current pain management  - Patient agreed and understood. Her and her daughter had ample time to ask questions.          MEDICATIONS ADMINISTERED IN LAST 1 DAY:    cefTRIAXone (Rocephin) 1 g in sodium chloride 0.9% 100 mL IVPB-ADDV       Date Action Dose Route User    7/18/2024 1906 New Bag 1 g Intravenous Parris King, PIPPA          HYDROcodone-acetaminophen (Norco) 5-325 MG per tab 1 tablet       Date Action Dose Route User    7/18/2024 2053 Given 1 tablet Oral Carol Wood RN          HYDROmorphone (Dilaudid) 1 MG/ML injection 0.5 mg       Date Action Dose Route User    7/18/2024 1321 Given 0.5 mg Intravenous Jennifer Fernandez RN       sodium chloride 0.9% infusion       Date Action Dose Route User    7/18/2024 1906 New Bag (none) Intravenous Parris King RN          sodium chloride 0.9% infusion       Date Action Dose Route User    7/19/2024 0926 New Bag (none) Intravenous Teresa Dalal RN    7/19/2024 0700 Rate/Dose Change (none) Intravenous Zeyn Jacobo RN            Vitals (last day)       Date/Time Temp Pulse Resp BP SpO2 Weight O2 Device O2 Flow Rate (L/min) [facility]    07/19/24 1300 -- 108 -- -- -- -- -- --     07/19/24 1130 97.7 °F (36.5 °C) 89 18 130/65 96 % -- None (Room air) --     07/19/24 0800 98.7 °F (37.1 °C) 76 18 135/66 98 % -- None (Room air) --     07/19/24 0500 97.6 °F (36.4 °C) 76 18 128/60 -- -- None (Room air) -- CB    07/19/24  0000 97.2 °F (36.2 °C) 79 16 116/59 -- -- None (Room air) -- CB    07/18/24 2100 -- 90 18 136/72 -- -- None (Room air) -- JH    07/18/24 2000 97.8 °F (36.6 °C) 93 23 121/67 -- -- None (Room air) -- CB    07/18/24 1812 -- -- -- -- -- 155 lb (70.3 kg) -- -- LC    07/18/24 1603 -- 71 12 131/67 98 % -- None (Room air) -- MM    07/18/24 1323 -- 66 18 149/78 97 % -- None (Room air) -- MM    07/18/24 1150 97.6 °F (36.4 °C) 86 18 162/84 97 % 155 lb (70.3 kg) None (Room air) -- BP

## 2024-07-20 ENCOUNTER — ANESTHESIA (OUTPATIENT)
Dept: SURGERY | Facility: HOSPITAL | Age: 64
End: 2024-07-20
Payer: MEDICARE

## 2024-07-20 ENCOUNTER — ANESTHESIA EVENT (OUTPATIENT)
Dept: SURGERY | Facility: HOSPITAL | Age: 64
End: 2024-07-20
Payer: MEDICARE

## 2024-07-20 PROBLEM — D49.59 UTERINE NEOPLASM: Status: ACTIVE | Noted: 2024-07-20

## 2024-07-20 LAB
GLUCOSE BLD-MCNC: 151 MG/DL (ref 70–99)
GLUCOSE BLD-MCNC: 198 MG/DL (ref 70–99)
GLUCOSE BLD-MCNC: 209 MG/DL (ref 70–99)
GLUCOSE BLD-MCNC: 228 MG/DL (ref 70–99)
HCT VFR BLD AUTO: 33.4 %
HCT VFR BLD AUTO: 34.9 %
HCT VFR BLD AUTO: 37.6 %
HGB BLD-MCNC: 11 G/DL
HGB BLD-MCNC: 11.6 G/DL
HGB BLD-MCNC: 12.3 G/DL

## 2024-07-20 PROCEDURE — 0UT20ZZ RESECTION OF BILATERAL OVARIES, OPEN APPROACH: ICD-10-PCS | Performed by: SURGERY

## 2024-07-20 PROCEDURE — 07BC0ZZ EXCISION OF PELVIS LYMPHATIC, OPEN APPROACH: ICD-10-PCS | Performed by: SURGERY

## 2024-07-20 PROCEDURE — 3075F SYST BP GE 130 - 139MM HG: CPT | Performed by: HOSPITALIST

## 2024-07-20 PROCEDURE — 3008F BODY MASS INDEX DOCD: CPT | Performed by: HOSPITALIST

## 2024-07-20 PROCEDURE — 0TN60ZZ RELEASE RIGHT URETER, OPEN APPROACH: ICD-10-PCS | Performed by: SURGERY

## 2024-07-20 PROCEDURE — 76942 ECHO GUIDE FOR BIOPSY: CPT | Performed by: ANESTHESIOLOGY

## 2024-07-20 PROCEDURE — 3E0T3BZ INTRODUCTION OF ANESTHETIC AGENT INTO PERIPHERAL NERVES AND PLEXI, PERCUTANEOUS APPROACH: ICD-10-PCS | Performed by: SURGERY

## 2024-07-20 PROCEDURE — 0TN70ZZ RELEASE LEFT URETER, OPEN APPROACH: ICD-10-PCS | Performed by: SURGERY

## 2024-07-20 PROCEDURE — 0UT70ZZ RESECTION OF BILATERAL FALLOPIAN TUBES, OPEN APPROACH: ICD-10-PCS | Performed by: SURGERY

## 2024-07-20 PROCEDURE — 3078F DIAST BP <80 MM HG: CPT | Performed by: HOSPITALIST

## 2024-07-20 PROCEDURE — 0UT90ZZ RESECTION OF UTERUS, OPEN APPROACH: ICD-10-PCS | Performed by: SURGERY

## 2024-07-20 PROCEDURE — 99233 SBSQ HOSP IP/OBS HIGH 50: CPT | Performed by: HOSPITALIST

## 2024-07-20 RX ORDER — CEFAZOLIN SODIUM 1 G/3ML
INJECTION, POWDER, FOR SOLUTION INTRAMUSCULAR; INTRAVENOUS AS NEEDED
Status: DISCONTINUED | OUTPATIENT
Start: 2024-07-20 | End: 2024-07-20 | Stop reason: SURG

## 2024-07-20 RX ORDER — MEPERIDINE HYDROCHLORIDE 25 MG/ML
12.5 INJECTION INTRAMUSCULAR; INTRAVENOUS; SUBCUTANEOUS AS NEEDED
Status: DISCONTINUED | OUTPATIENT
Start: 2024-07-20 | End: 2024-07-20 | Stop reason: HOSPADM

## 2024-07-20 RX ORDER — CIPROFLOXACIN HYDROCHLORIDE 3.5 MG/ML
1 SOLUTION/ DROPS TOPICAL
Status: DISCONTINUED | OUTPATIENT
Start: 2024-07-20 | End: 2024-07-22

## 2024-07-20 RX ORDER — ONDANSETRON 2 MG/ML
4 INJECTION INTRAMUSCULAR; INTRAVENOUS EVERY 6 HOURS PRN
Status: DISCONTINUED | OUTPATIENT
Start: 2024-07-20 | End: 2024-07-20

## 2024-07-20 RX ORDER — INSULIN ASPART 100 [IU]/ML
INJECTION, SOLUTION INTRAVENOUS; SUBCUTANEOUS ONCE
Status: COMPLETED | OUTPATIENT
Start: 2024-07-20 | End: 2024-07-20

## 2024-07-20 RX ORDER — SODIUM CHLORIDE, SODIUM LACTATE, POTASSIUM CHLORIDE, CALCIUM CHLORIDE 600; 310; 30; 20 MG/100ML; MG/100ML; MG/100ML; MG/100ML
INJECTION, SOLUTION INTRAVENOUS CONTINUOUS
Status: DISCONTINUED | OUTPATIENT
Start: 2024-07-20 | End: 2024-07-21

## 2024-07-20 RX ORDER — METOCLOPRAMIDE HYDROCHLORIDE 5 MG/ML
10 INJECTION INTRAMUSCULAR; INTRAVENOUS EVERY 8 HOURS PRN
Status: DISCONTINUED | OUTPATIENT
Start: 2024-07-20 | End: 2024-07-20 | Stop reason: HOSPADM

## 2024-07-20 RX ORDER — MIDAZOLAM HYDROCHLORIDE 1 MG/ML
1 INJECTION INTRAMUSCULAR; INTRAVENOUS EVERY 5 MIN PRN
Status: DISCONTINUED | OUTPATIENT
Start: 2024-07-20 | End: 2024-07-20 | Stop reason: HOSPADM

## 2024-07-20 RX ORDER — NALOXONE HYDROCHLORIDE 0.4 MG/ML
0.08 INJECTION, SOLUTION INTRAMUSCULAR; INTRAVENOUS; SUBCUTANEOUS
Status: DISCONTINUED | OUTPATIENT
Start: 2024-07-20 | End: 2024-07-22

## 2024-07-20 RX ORDER — BUPIVACAINE HYDROCHLORIDE 2.5 MG/ML
INJECTION, SOLUTION EPIDURAL; INFILTRATION; INTRACAUDAL AS NEEDED
Status: DISCONTINUED | OUTPATIENT
Start: 2024-07-20 | End: 2024-07-20 | Stop reason: SURG

## 2024-07-20 RX ORDER — DEXAMETHASONE SODIUM PHOSPHATE 4 MG/ML
VIAL (ML) INJECTION AS NEEDED
Status: DISCONTINUED | OUTPATIENT
Start: 2024-07-20 | End: 2024-07-20 | Stop reason: SURG

## 2024-07-20 RX ORDER — METOPROLOL TARTRATE 1 MG/ML
2.5 INJECTION, SOLUTION INTRAVENOUS ONCE
Status: DISCONTINUED | OUTPATIENT
Start: 2024-07-20 | End: 2024-07-20 | Stop reason: HOSPADM

## 2024-07-20 RX ORDER — METOCLOPRAMIDE HYDROCHLORIDE 5 MG/ML
10 INJECTION INTRAMUSCULAR; INTRAVENOUS
Status: DISCONTINUED | OUTPATIENT
Start: 2024-07-20 | End: 2024-07-22

## 2024-07-20 RX ORDER — ACETAMINOPHEN 500 MG
1000 TABLET ORAL ONCE AS NEEDED
Status: DISCONTINUED | OUTPATIENT
Start: 2024-07-20 | End: 2024-07-20 | Stop reason: HOSPADM

## 2024-07-20 RX ORDER — METOCLOPRAMIDE 10 MG/1
10 TABLET ORAL
Status: DISCONTINUED | OUTPATIENT
Start: 2024-07-20 | End: 2024-07-22

## 2024-07-20 RX ORDER — METOCLOPRAMIDE HYDROCHLORIDE 5 MG/5ML
10 SOLUTION ORAL 3 TIMES DAILY PRN
Status: DISCONTINUED | OUTPATIENT
Start: 2024-07-20 | End: 2024-07-20

## 2024-07-20 RX ORDER — ENOXAPARIN SODIUM 100 MG/ML
40 INJECTION SUBCUTANEOUS DAILY
Status: DISCONTINUED | OUTPATIENT
Start: 2024-07-21 | End: 2024-07-22

## 2024-07-20 RX ORDER — NALOXONE HYDROCHLORIDE 0.4 MG/ML
80 INJECTION, SOLUTION INTRAMUSCULAR; INTRAVENOUS; SUBCUTANEOUS AS NEEDED
Status: DISCONTINUED | OUTPATIENT
Start: 2024-07-20 | End: 2024-07-20 | Stop reason: HOSPADM

## 2024-07-20 RX ORDER — HYDROMORPHONE HYDROCHLORIDE 1 MG/ML
0.4 INJECTION, SOLUTION INTRAMUSCULAR; INTRAVENOUS; SUBCUTANEOUS EVERY 5 MIN PRN
Status: DISCONTINUED | OUTPATIENT
Start: 2024-07-20 | End: 2024-07-20 | Stop reason: HOSPADM

## 2024-07-20 RX ORDER — LABETALOL HYDROCHLORIDE 5 MG/ML
5 INJECTION, SOLUTION INTRAVENOUS EVERY 5 MIN PRN
Status: DISCONTINUED | OUTPATIENT
Start: 2024-07-20 | End: 2024-07-20 | Stop reason: HOSPADM

## 2024-07-20 RX ORDER — OXYCODONE HYDROCHLORIDE 5 MG/1
5 TABLET ORAL EVERY 4 HOURS PRN
Status: DISCONTINUED | OUTPATIENT
Start: 2024-07-20 | End: 2024-07-22

## 2024-07-20 RX ORDER — SCOLOPAMINE TRANSDERMAL SYSTEM 1 MG/1
1 PATCH, EXTENDED RELEASE TRANSDERMAL
Status: DISCONTINUED | OUTPATIENT
Start: 2024-07-20 | End: 2024-07-22

## 2024-07-20 RX ORDER — INSULIN ASPART 100 [IU]/ML
INJECTION, SOLUTION INTRAVENOUS; SUBCUTANEOUS
Status: COMPLETED
Start: 2024-07-20 | End: 2024-07-20

## 2024-07-20 RX ORDER — HYDROMORPHONE HYDROCHLORIDE 1 MG/ML
0.6 INJECTION, SOLUTION INTRAMUSCULAR; INTRAVENOUS; SUBCUTANEOUS EVERY 5 MIN PRN
Status: DISCONTINUED | OUTPATIENT
Start: 2024-07-20 | End: 2024-07-20 | Stop reason: HOSPADM

## 2024-07-20 RX ORDER — ROCURONIUM BROMIDE 10 MG/ML
INJECTION, SOLUTION INTRAVENOUS AS NEEDED
Status: DISCONTINUED | OUTPATIENT
Start: 2024-07-20 | End: 2024-07-20 | Stop reason: SURG

## 2024-07-20 RX ORDER — HYDROMORPHONE HYDROCHLORIDE 1 MG/ML
INJECTION, SOLUTION INTRAMUSCULAR; INTRAVENOUS; SUBCUTANEOUS AS NEEDED
Status: DISCONTINUED | OUTPATIENT
Start: 2024-07-20 | End: 2024-07-20 | Stop reason: SURG

## 2024-07-20 RX ORDER — ONDANSETRON 2 MG/ML
4 INJECTION INTRAMUSCULAR; INTRAVENOUS EVERY 6 HOURS PRN
Status: DISCONTINUED | OUTPATIENT
Start: 2024-07-20 | End: 2024-07-20 | Stop reason: HOSPADM

## 2024-07-20 RX ORDER — ONDANSETRON 2 MG/ML
INJECTION INTRAMUSCULAR; INTRAVENOUS AS NEEDED
Status: DISCONTINUED | OUTPATIENT
Start: 2024-07-20 | End: 2024-07-20 | Stop reason: SURG

## 2024-07-20 RX ORDER — ACETAMINOPHEN 500 MG
1000 TABLET ORAL EVERY 6 HOURS
Status: DISCONTINUED | OUTPATIENT
Start: 2024-07-20 | End: 2024-07-22

## 2024-07-20 RX ORDER — HYDROCODONE BITARTRATE AND ACETAMINOPHEN 5; 325 MG/1; MG/1
2 TABLET ORAL ONCE AS NEEDED
Status: DISCONTINUED | OUTPATIENT
Start: 2024-07-20 | End: 2024-07-20 | Stop reason: HOSPADM

## 2024-07-20 RX ORDER — HYDROCODONE BITARTRATE AND ACETAMINOPHEN 5; 325 MG/1; MG/1
1 TABLET ORAL ONCE AS NEEDED
Status: DISCONTINUED | OUTPATIENT
Start: 2024-07-20 | End: 2024-07-20 | Stop reason: HOSPADM

## 2024-07-20 RX ORDER — HYDROMORPHONE HYDROCHLORIDE 1 MG/ML
0.2 INJECTION, SOLUTION INTRAMUSCULAR; INTRAVENOUS; SUBCUTANEOUS EVERY 5 MIN PRN
Status: DISCONTINUED | OUTPATIENT
Start: 2024-07-20 | End: 2024-07-20 | Stop reason: HOSPADM

## 2024-07-20 RX ORDER — SODIUM CHLORIDE, SODIUM LACTATE, POTASSIUM CHLORIDE, CALCIUM CHLORIDE 600; 310; 30; 20 MG/100ML; MG/100ML; MG/100ML; MG/100ML
INJECTION, SOLUTION INTRAVENOUS CONTINUOUS
Status: DISCONTINUED | OUTPATIENT
Start: 2024-07-20 | End: 2024-07-20 | Stop reason: HOSPADM

## 2024-07-20 RX ADMIN — ROCURONIUM BROMIDE 50 MG: 10 INJECTION, SOLUTION INTRAVENOUS at 08:34:00

## 2024-07-20 RX ADMIN — DEXAMETHASONE SODIUM PHOSPHATE 12 MG: 4 MG/ML VIAL (ML) INJECTION at 08:07:00

## 2024-07-20 RX ADMIN — SODIUM CHLORIDE: 9 INJECTION, SOLUTION INTRAVENOUS at 10:10:00

## 2024-07-20 RX ADMIN — CEFAZOLIN SODIUM 2 G: 1 INJECTION, POWDER, FOR SOLUTION INTRAMUSCULAR; INTRAVENOUS at 08:05:00

## 2024-07-20 RX ADMIN — ONDANSETRON 4 MG: 2 INJECTION INTRAMUSCULAR; INTRAVENOUS at 10:08:00

## 2024-07-20 RX ADMIN — HYDROMORPHONE HYDROCHLORIDE 1 MG: 1 INJECTION, SOLUTION INTRAMUSCULAR; INTRAVENOUS; SUBCUTANEOUS at 08:26:00

## 2024-07-20 RX ADMIN — BUPIVACAINE HYDROCHLORIDE 60 ML: 2.5 INJECTION, SOLUTION EPIDURAL; INFILTRATION; INTRACAUDAL at 10:23:00

## 2024-07-20 RX ADMIN — ROCURONIUM BROMIDE 50 MG: 10 INJECTION, SOLUTION INTRAVENOUS at 08:00:00

## 2024-07-20 RX ADMIN — ROCURONIUM BROMIDE 20 MG: 10 INJECTION, SOLUTION INTRAVENOUS at 10:01:00

## 2024-07-20 NOTE — PROGRESS NOTES
[facility]   part of [facility]     Hospitalist Progress Note     Rosario Benitez Patient Status:  Inpatient    10/2/1960 MRN [id number]   Location [facility] 7NE-A Attending Maximiliano Birch MD   Hosp Day # 2 PCP Petar Campbell MD     Chief Complaint: vaginal bleeding    Subjective:     Patient feels well today.  Has not noticed much pain, no more bleeding.  Denies fever, chills, n/v.  No other acute complaints .     Objective:    Review of Systems:   A comprehensive review of systems was completed; pertinent positive and negatives stated in subjective.    Vital signs:  Temp:  [97.7 °F (36.5 °C)-98.9 °F (37.2 °C)] 97.9 °F (36.6 °C)  Pulse:  [] 69  Resp:  [18] 18  BP: (110-134)/(56-65) 132/65  SpO2:  [93 %-96 %] 95 %    Physical Exam:    General: No acute distress, pleasant   Respiratory: No wheezes, no rhonchi  Cardiovascular: S1, S2, regular rate and rhythm  Abdomen: Soft, Non-tender, non-distended, positive bowel sounds  Neuro: No new focal deficits.   Extremities: No edema    Diagnostic Data:    Labs:  Recent Labs   Lab 24  1154 24  0806 24  1732 24  2347   WBC 8.2 8.9  --   --    HGB 14.2 12.3 11.8* 11.0*   MCV 90.3 90.7  --   --    .0 227.0  --   --        Recent Labs   Lab 24  1154 24  0806   * 136*   BUN 15 7*   CREATSERUM 0.81 0.58   CA 9.5 8.8   ALB 4.1  --    * 139   K 4.2 3.9    109   CO2 25.0 24.0   ALKPHO 114  --    AST 25  --    ALT 27  --    BILT 0.5  --    TP 7.9  --        Estimated Creatinine Clearance: 76.5 mL/min (based on SCr of 0.58 mg/dL).    No results for input(s): \"TROP\", \"TROPHS\", \"CK\" in the last 168 hours.    No results for input(s): \"PTP\", \"INR\" in the last 168 hours.               Microbiology    Hospital Encounter on 24   1. Urine Culture, Routine     Status: None    Collection Time: 24 11:47 AM    Specimen: Urine, clean catch   Result Value Ref Range    Urine Culture No Growth at 18-24  hrs. N/A         Imaging: Reviewed in Epic.    Medications:    [Transfer Hold] hydrocortisone   Topical BID    ceFAZolin  1 g Intravenous Q8H    [Transfer Hold] insulin aspart  1-5 Units Subcutaneous TID AC and HS    [Transfer Hold] carvedilol  3.125 mg Oral BID with meals    [Transfer Hold] losartan  25 mg Oral Daily       Assessment & Plan:      #Vaginal bleeding  #Pelvic mass; Uterine neoplasm   Surgical oncology and ob/gyn consult  S/p radical hysterectomy and blsoo on 7.20  Post op care per surgery  Pain control, anti-emetics    #Right eye pain  Acute post surgery, concern for abrasion   Will start cipro eye drops     #UTI  Short course Abx  Cefazolin, 3 days total     #mild hyponatremia - resolved    #CAD with hx MI but has no stents    #DM Type 2  HgA1c of 6.6  ISS    #HTN - coreg, losartan       Maximiliano Birch MD    Supplementary Documentation:     Quality:  DVT Mechanical Prophylaxis:   SCDs,    DVT Pharmacologic Prophylaxis   Medication   None      DVT Pharmacologic prophylaxis: Aspirin 162 mg         Code Status: Not on file  Peterson: Peterson catheter in place  Peterson Duration (in days):   Central line:    HARVEY:     Discharge is dependent on: Surgical oncology   At this point Ms. Benitez is expected to be discharge to: Home    The 21st Century Cures Act makes medical notes like these available to patients in the interest of transparency. Please be advised this is a medical document. Medical documents are intended to carry relevant information, facts as evident, and the clinical opinion of the practitioner. The medical note is intended as peer to peer communication and may appear blunt or direct. It is written in medical language and may contain abbreviations or verbiage that are unfamiliar.             **Certification      PHYSICIAN Certification of Need for Inpatient Hospitalization - Initial Certification    Patient will require inpatient services that will reasonably be expected to span two midnight's based on  the clinical documentation in H+P.   Based on patients current state of illness, I anticipate that, after discharge, patient will require TBD.

## 2024-07-20 NOTE — ANESTHESIA PROCEDURE NOTES
Peripheral IV  Date/Time: 7/20/2024 8:05 AM  Inserted by: Daniel Orellana MD    Placement  Needle size: 20 G  Laterality: left  Location: hand  Local anesthetic: none  Site prep: alcohol  Technique: anatomical landmarks  Attempts: 1

## 2024-07-20 NOTE — ANESTHESIA PROCEDURE NOTES
Regional Block    Date/Time: 7/20/2024 10:18 AM    Performed by: Darryl Aguilera MD  Authorized by: Darryl Aguilera MD      General Information and Staff    Start Time:  7/20/2024 10:18 AM  End Time:  7/20/2024 10:24 AM  Anesthesiologist:  Darryl Aguilera MD  Performed by:  Anesthesiologist  Patient Location:  OR    Block Placement: Post Induction  Site Identification: real time ultrasound guided and image stored and retrievable    Block site/laterality marked before start: site marked  Reason for Block: at surgeon's request and post-op pain management    Preanesthetic Checklist: 2 patient identifers, IV checked, risks and benefits discussed, monitors and equipment checked, pre-op evaluation, timeout performed, anesthesia consent, sterile technique used, no prohibitive neurological deficits and no local skin infection at insertion site      Procedure Details    Patient Position:  Supine  Prep: ChloraPrep    Monitoring:  Cardiac monitor, continuous pulse ox and blood pressure cuff  Block Type:  TAP  Laterality:  Bilateral  Injection Technique:  Single-shot    Needle    Needle Type:  Short-bevel and echogenic  Needle Gauge:  21 G  Needle Length:  100 mm  Needle Localization:  Ultrasound guidance  Reason for Ultrasound Use: appropriate spread of the medication was noted in real time and no ultrasound evidence of intravascular and/or intraneural injection            Assessment    Injection Assessment:  Good spread noted, negative resistance, negative aspiration for heme, incremental injection and low pressure  Heart Rate Change: No    - Patient tolerated block procedure well without evidence of immediate block related complications.     Medications  7/20/2024 10:18 AM      Additional Comments    Medication:  Bupivacaine 0.25% 60mL with 2mg PF decadron

## 2024-07-20 NOTE — BRIEF OP NOTE
Pre-Operative Diagnosis: Uterine Neoplasm     Post-Operative Diagnosis: Uterine neoplasm     Procedure Performed:   Radical abbdominal hysterectomy with bilateral salpingo-oophorectomies and partial vaginectomy. Pelvic lymphadenectomy. Bilateral ureterolysis.    Surgeons and Role:     * Tex Chavarria MD - Primary    Assistant(s):  Surgical Assistant.: Darryl Lebron M4    Surgical Findings: Enlarged uterus, tumor within vaginal canal.     Specimen: Yes     Estimated Blood Loss: Blood Output: 40 mL (7/20/2024 10:08 AM)        Tex Chavarria MD  7/20/2024  10:23 AM

## 2024-07-20 NOTE — PLAN OF CARE
Assumed care  Pain controlled with Norco overnight.   IVF infusing as ordered.   NPO for surgery this am.  Patient showered with CHG scrub.   Consent in chart.  Family at bedside.

## 2024-07-20 NOTE — PROGRESS NOTES
Blood pressure 132/65, pulse 69, temperature 97.9 °F (36.6 °C), temperature source Oral, resp. rate 18, height 1.575 m (5' 2\"), weight 72.2 kg (159 lb 2.8 oz), SpO2 95%.    Intake/Output Summary (Last 24 hours) at 7/20/2024 0745  Last data filed at 7/20/2024 0639  Gross per 24 hour   Intake 1735 ml   Output 1925 ml   Net -190 ml     Lab Results   Component Value Date    WBC 8.9 07/19/2024    HGB 11.0 07/19/2024    HCT 33.4 07/19/2024    .0 07/19/2024    CREATSERUM 0.58 07/19/2024    BUN 7 07/19/2024     07/19/2024    K 3.9 07/19/2024     07/19/2024    CO2 24.0 07/19/2024     07/19/2024    CA 8.8 07/19/2024     The surgical treatment matter including indications, rationale, and risks was discussed in detail.  Patient agreed and understood.  Patient had ample time to ask questions.     Tex Chavarria MD FACS

## 2024-07-20 NOTE — ANESTHESIA PREPROCEDURE EVALUATION
PRE-OP EVALUATION    Patient Name: Rosario Benitez    Admit Diagnosis: Vaginal bleeding [N93.9]  Abdominal pain of unknown etiology [R10.9]  Endometrial mass [N94.89]    Pre-op Diagnosis: Abdominal pain [R10.9]    TOTAL ABDOMINAL HYSTERECTOMY, BILATERAL SALPINGO-OOPHORECTOMY POSS. OPEN    Anesthesia Procedure: TOTAL ABDOMINAL HYSTERECTOMY, BILATERAL SALPINGO-OOPHORECTOMY POSS. OPEN    Surgeons and Role:     * Tex Chavarria MD - Primary    Pre-op vitals reviewed.  Temp: 97.9 °F (36.6 °C)  Pulse: 69  Resp: 18  BP: 132/65  SpO2: 95 %  Body mass index is 29.11 kg/m².    Current medications reviewed.  Hospital Medications:   hydrocortisone 1 % cream   Topical BID    ceFAZolin (Ancef) 1 g in dextrose 5% 100mL IVPB-ADD  1 g Intravenous Q8H    [COMPLETED] iopamidol 76% (ISOVUE-370) injection for power injector  75 mL Intravenous ONCE PRN    [COMPLETED] sodium chloride 0.9 % IV bolus 1,000 mL  1,000 mL Intravenous Once    HYDROmorphone (Dilaudid) 1 MG/ML injection 0.5 mg  0.5 mg Intravenous Q30 Min PRN    [COMPLETED] ondansetron (Zofran) 4 MG/2ML injection 4 mg  4 mg Intravenous Once    [COMPLETED] iopamidol 76% (ISOVUE-370) injection for power injector  100 mL Intravenous ONCE PRN    [] sodium chloride 0.9% infusion   Intravenous Continuous    sodium chloride 0.9% infusion   Intravenous Continuous    acetaminophen (Tylenol Extra Strength) tab 500 mg  500 mg Oral Q4H PRN    melatonin tab 3 mg  3 mg Oral Nightly PRN    polyethylene glycol (PEG 3350) (Miralax) 17 g oral packet 17 g  17 g Oral Daily PRN    sennosides (Senokot) tab 17.2 mg  17.2 mg Oral Nightly PRN    bisacodyl (Dulcolax) 10 MG rectal suppository 10 mg  10 mg Rectal Daily PRN    ondansetron (Zofran) 4 MG/2ML injection 4 mg  4 mg Intravenous Q6H PRN    benzonatate (Tessalon) cap 200 mg  200 mg Oral TID PRN    glycerin-hypromellose- (Artificial Tears) 0.2-0.2-1 % ophthalmic solution 1 drop  1 drop Both Eyes QID PRN    sodium chloride (Saline Mist)  0.65 % nasal solution 1 spray  1 spray Each Nare Q3H PRN    acetaminophen (Tylenol) tab 650 mg  650 mg Oral Q4H PRN    Or    HYDROcodone-acetaminophen (Norco) 5-325 MG per tab 1 tablet  1 tablet Oral Q4H PRN    Or    HYDROcodone-acetaminophen (Norco) 5-325 MG per tab 2 tablet  2 tablet Oral Q4H PRN    glucose (Dex4) 15 GM/59ML oral liquid 15 g  15 g Oral Q15 Min PRN    Or    glucose (Glutose) 40% oral gel 15 g  15 g Oral Q15 Min PRN    Or    glucose-vitamin C (Dex-4) chewable tab 4 tablet  4 tablet Oral Q15 Min PRN    Or    dextrose 50% injection 50 mL  50 mL Intravenous Q15 Min PRN    Or    glucose (Dex4) 15 GM/59ML oral liquid 30 g  30 g Oral Q15 Min PRN    Or    glucose (Glutose) 40% oral gel 30 g  30 g Oral Q15 Min PRN    Or    glucose-vitamin C (Dex-4) chewable tab 8 tablet  8 tablet Oral Q15 Min PRN    insulin aspart (NovoLOG) 100 Units/mL FlexPen 1-5 Units  1-5 Units Subcutaneous TID AC and HS    HYDROmorphone (Dilaudid) 1 MG/ML injection 0.2 mg  0.2 mg Intravenous Q2H PRN    Or    HYDROmorphone (Dilaudid) 1 MG/ML injection 0.4 mg  0.4 mg Intravenous Q2H PRN    Or    HYDROmorphone (Dilaudid) 1 MG/ML injection 0.8 mg  0.8 mg Intravenous Q2H PRN    carvedilol (Coreg) tab 3.125 mg  3.125 mg Oral BID with meals    losartan (Cozaar) tab 25 mg  25 mg Oral Daily       Outpatient Medications:     Medications Prior to Admission   Medication Sig Dispense Refill Last Dose    metFORMIN 500 MG Oral Tab Take 1 tablet (500 mg total) by mouth 2 (two) times daily with meals.   7/17/2024    carvedilol 3.125 MG Oral Tab Take 1 tablet (3.125 mg total) by mouth 2 (two) times daily with meals.   7/17/2024    losartan 25 MG Oral Tab Take 1 tablet (25 mg total) by mouth daily.   7/17/2024    aspirin 81 MG Oral Chew Tab Chew 1 tablet (81 mg total) by mouth daily.   More than a month       Allergies: Patient has no known allergies.      Anesthesia Evaluation    Patient summary reviewed.    Anesthetic Complications            GI/Hepatic/Renal    Negative GI/hepatic/renal ROS.                             Cardiovascular    Negative cardiovascular ROS.              (+) hypertension                                     Endo/Other      (+) diabetes                            Pulmonary    Negative pulmonary ROS.                       Neuro/Psych    Negative neuro/psych ROS.                          Patient Active Problem List:     Abdominal pain of unknown etiology     Hyponatremia     Hyperglycemia     Vaginal bleeding     Endometrial mass     Acute cystitis without hematuria           Past Surgical History:   Procedure Laterality Date    Anesth,radical leg bone surgery       Social History     Socioeconomic History    Marital status: Single   Tobacco Use    Smoking status: Former     Types: Cigarettes    Smokeless tobacco: Never   Substance and Sexual Activity    Alcohol use: Yes    Drug use: Never     History   Drug Use Unknown     Available pre-op labs reviewed.  Lab Results   Component Value Date    WBC 8.9 07/19/2024    RBC 4.07 07/19/2024    HGB 11.0 (L) 07/19/2024    HCT 33.4 (L) 07/19/2024    MCV 90.7 07/19/2024    MCH 30.2 07/19/2024    MCHC 33.3 07/19/2024    RDW 13.2 07/19/2024    .0 07/19/2024     Lab Results   Component Value Date     07/19/2024    K 3.9 07/19/2024     07/19/2024    CO2 24.0 07/19/2024    BUN 7 (L) 07/19/2024    CREATSERUM 0.58 07/19/2024     (H) 07/19/2024    CA 8.8 07/19/2024            Airway      Mallampati: II  Mouth opening: >3 FB  TM distance: > 6 cm  Neck ROM: full Cardiovascular    Cardiovascular exam normal.         Dental    Dentition appears grossly intact         Pulmonary    Pulmonary exam normal.                 Other findings              ASA: 2   Plan: general  NPO status verified and patient meets guidelines.        Comment:  I explained intrinsic risks of general anesthesia, including nausea, dental damage, sore throat, mouth injury,and hoarseness from airway  management.  All questions were answered and understanding was demonstrated of risks.  Informed permission was obtained to proceed as documented in the signed consent form.     .Risk and benefits of nerve block explained including but not limited to: nerve damage, neuropathy, bleeding, infection.       Plan/risks discussed with: patient                Present on Admission:   Hyponatremia   Hyperglycemia

## 2024-07-20 NOTE — ANESTHESIA PROCEDURE NOTES
Airway  Date/Time: 7/20/2024 8:02 AM  Urgency: elective      General Information and Staff    Patient location during procedure: OR  Anesthesiologist: Daniel Orellana MD  Performed: anesthesiologist   Performed by: Daniel Orellana MD  Authorized by: Daniel Orellana MD      Indications and Patient Condition  Indications for airway management: anesthesia  Sedation level: deep  Preoxygenated: yes  Patient position: sniffing  Mask difficulty assessment: 1 - vent by mask    Final Airway Details  Final airway type: endotracheal airway      Successful airway: ETT  Cuffed: yes   Successful intubation technique: direct laryngoscopy  Endotracheal tube insertion site: oral  Blade size: #3  ETT size (mm): 7.0    Cormack-Lehane Classification: grade I - full view of glottis  Placement verified by: capnometry   Measured from: lips  ETT to lips (cm): 21  Number of attempts at approach: 1

## 2024-07-20 NOTE — PLAN OF CARE
Assumed care at approx 0730. To surgery this AM, back at approx 1200.  Alert and oriented x4.  On 3L O2 NC, respirations even and unlabored.  NSR on tele.  Pain controlled via PCA  Midline incision intact, dressing C/D/I, topifoam and binder in place.  Passing gas. Nausea controlled with medications.  Peterson in place, urine output meets goals, see flowsheets.  Ambulated [place] x1.  Safety precautions maintained, patient reports needs met, call light within reach.      Problem: Diabetes/Glucose Control  Goal: Glucose maintained within prescribed range  Description: INTERVENTIONS:  - Monitor Blood Glucose as ordered  - Assess for signs and symptoms of hyperglycemia and hypoglycemia  - Administer ordered medications to maintain glucose within target range  - Assess barriers to adequate nutritional intake and initiate nutrition consult as needed  - Instruct patient on self management of diabetes  Outcome: Progressing     Problem: PAIN - ADULT  Goal: Verbalizes/displays adequate comfort level or patient's stated pain goal  Description: INTERVENTIONS:  - Encourage pt to monitor pain and request assistance  - Assess pain using appropriate pain scale  - Administer analgesics based on type and severity of pain and evaluate response  - Implement non-pharmacological measures as appropriate and evaluate response  - Consider cultural and social influences on pain and pain management  - Manage/alleviate anxiety  - Utilize distraction and/or relaxation techniques  - Monitor for opioid side effects  - Notify MD/LIP if interventions unsuccessful or patient reports new pain  - Anticipate increased pain with activity and pre-medicate as appropriate  Outcome: Progressing

## 2024-07-21 LAB
ALBUMIN SERPL-MCNC: 4.3 G/DL (ref 3.2–4.8)
ALBUMIN/GLOB SERPL: 1.7 {RATIO} (ref 1–2)
ALP LIVER SERPL-CCNC: 85 U/L
ALT SERPL-CCNC: 12 U/L
ANION GAP SERPL CALC-SCNC: 7 MMOL/L (ref 0–18)
AST SERPL-CCNC: 19 U/L (ref ?–34)
BILIRUB SERPL-MCNC: 0.6 MG/DL (ref 0.2–1.1)
BUN BLD-MCNC: 6 MG/DL (ref 9–23)
CALCIUM BLD-MCNC: 9.1 MG/DL (ref 8.7–10.4)
CHLORIDE SERPL-SCNC: 105 MMOL/L (ref 98–112)
CO2 SERPL-SCNC: 29 MMOL/L (ref 21–32)
CREAT BLD-MCNC: 0.73 MG/DL
EGFRCR SERPLBLD CKD-EPI 2021: 91 ML/MIN/1.73M2 (ref 60–?)
ERYTHROCYTE [DISTWIDTH] IN BLOOD BY AUTOMATED COUNT: 13.2 %
GLOBULIN PLAS-MCNC: 2.5 G/DL (ref 2.8–4.4)
GLUCOSE BLD-MCNC: 121 MG/DL (ref 70–99)
GLUCOSE BLD-MCNC: 123 MG/DL (ref 70–99)
GLUCOSE BLD-MCNC: 124 MG/DL (ref 70–99)
GLUCOSE BLD-MCNC: 142 MG/DL (ref 70–99)
GLUCOSE BLD-MCNC: 150 MG/DL (ref 70–99)
HCT VFR BLD AUTO: 34.1 %
HCT VFR BLD AUTO: 34.2 %
HCT VFR BLD AUTO: 36.3 %
HGB BLD-MCNC: 11.1 G/DL
HGB BLD-MCNC: 11.4 G/DL
HGB BLD-MCNC: 11.6 G/DL
MAGNESIUM SERPL-MCNC: 1.6 MG/DL (ref 1.6–2.6)
MCH RBC QN AUTO: 29.4 PG (ref 26–34)
MCHC RBC AUTO-ENTMCNC: 32 G/DL (ref 31–37)
MCV RBC AUTO: 92.1 FL
OSMOLALITY SERPL CALC.SUM OF ELEC: 291 MOSM/KG (ref 275–295)
PHOSPHATE SERPL-MCNC: 3.2 MG/DL (ref 2.4–5.1)
PLATELET # BLD AUTO: 303 10(3)UL (ref 150–450)
POTASSIUM SERPL-SCNC: 3.9 MMOL/L (ref 3.5–5.1)
PROT SERPL-MCNC: 6.8 G/DL (ref 5.7–8.2)
RBC # BLD AUTO: 3.94 X10(6)UL
SODIUM SERPL-SCNC: 141 MMOL/L (ref 136–145)
WBC # BLD AUTO: 14.1 X10(3) UL (ref 4–11)

## 2024-07-21 PROCEDURE — 99232 SBSQ HOSP IP/OBS MODERATE 35: CPT | Performed by: HOSPITALIST

## 2024-07-21 RX ORDER — MAGNESIUM OXIDE 400 MG/1
400 TABLET ORAL ONCE
Status: COMPLETED | OUTPATIENT
Start: 2024-07-21 | End: 2024-07-21

## 2024-07-21 RX ORDER — FAMOTIDINE 10 MG/ML
20 INJECTION, SOLUTION INTRAVENOUS 2 TIMES DAILY
Status: DISCONTINUED | OUTPATIENT
Start: 2024-07-21 | End: 2024-07-22

## 2024-07-21 RX ORDER — FAMOTIDINE 20 MG/1
20 TABLET, FILM COATED ORAL 2 TIMES DAILY
Status: DISCONTINUED | OUTPATIENT
Start: 2024-07-21 | End: 2024-07-22

## 2024-07-21 NOTE — PLAN OF CARE
Assumed care. Patient with pain/nausea last night. Oxy and zofran given with improvement in both nausea and pain. Tylenol also given scheduled. Patient was able to sleep and was comfortable overnight. This am, with movement, +nausea again. Zofran given again and will hopefully help with nausea. Also getting reglan TID before meals. Adequate urine output. Plan of care discussed with patient/family. Care ongoing.

## 2024-07-21 NOTE — PROGRESS NOTES
[facility]   part of [facility]     Hospitalist Progress Note     Rosario Benitez Patient Status:  Inpatient    10/2/1960 MRN [id number]   Location [facility] 7NE-A Attending Maximiliano Birch MD   Hosp Day # 3 PCP Petar Campbell MD     Chief Complaint: vaginal bleeding    Subjective:     Patient is feeling better today.  Pain is controlled.  Eye pain better.  Denies fever or chills.  No other acute complaints.      Objective:    Review of Systems:   A comprehensive review of systems was completed; pertinent positive and negatives stated in subjective.    Vital signs:  Temp:  [98 °F (36.7 °C)-99.1 °F (37.3 °C)] 99.1 °F (37.3 °C)  Pulse:  [81-95] 83  Resp:  [8-27] 17  BP: (125-157)/(67-92) 125/71  SpO2:  [90 %-100 %] 90 %    Physical Exam:    General: No acute distress, pleasant   Respiratory: No wheezes, no rhonchi  Cardiovascular: S1, S2, regular rate and rhythm  Abdomen: Soft, Non-tender, non-distended, positive bowel sounds  Neuro: No new focal deficits.   Extremities: No edema    Diagnostic Data:    Labs:  Recent Labs   Lab 24  1154 24  0806 24  1732 24  2347 24  1110 24  1655 24  2351   WBC 8.2 8.9  --   --   --   --   --    HGB 14.2 12.3 11.8* 11.0* 12.3 11.6* 11.4*   MCV 90.3 90.7  --   --   --   --   --    .0 227.0  --   --   --   --   --        Recent Labs   Lab 24  1154 24  0806   * 136*   BUN 15 7*   CREATSERUM 0.81 0.58   CA 9.5 8.8   ALB 4.1  --    * 139   K 4.2 3.9    109   CO2 25.0 24.0   ALKPHO 114  --    AST 25  --    ALT 27  --    BILT 0.5  --    TP 7.9  --        Estimated Creatinine Clearance: 76.5 mL/min (based on SCr of 0.58 mg/dL).    No results for input(s): \"TROP\", \"TROPHS\", \"CK\" in the last 168 hours.    No results for input(s): \"PTP\", \"INR\" in the last 168 hours.               Microbiology    Hospital Encounter on 24   1. Urine Culture, Routine     Status: None    Collection Time: 24  11:47 AM    Specimen: Urine, clean catch   Result Value Ref Range    Urine Culture No Growth at 18-24 hrs. N/A         Imaging: Reviewed in Epic.    Medications:    enoxaparin  40 mg Subcutaneous Daily    acetaminophen  1,000 mg Oral q6h    ciprofloxacin  1 drop Right Eye Q2H While awake    scopolamine  1 patch Transdermal Q72H    metoclopramide  10 mg Oral TID AC    Or    metoclopramide  10 mg Intravenous TID AC    hydrocortisone   Topical BID    ceFAZolin  1 g Intravenous Q8H    insulin aspart  1-5 Units Subcutaneous TID AC and HS    carvedilol  3.125 mg Oral BID with meals    losartan  25 mg Oral Daily       Assessment & Plan:      #Vaginal bleeding  #Pelvic mass; Uterine neoplasm   Surgical oncology and ob/gyn consult  S/p radical hysterectomy and blsoo on 7.20  Post op care per surgery  Pain control, anti-emetics  FLD - advance per surgery     #Right eye pain  Acute post surgery, concern for abrasion   Pain better with cipro drops     #UTI  Short course Abx  Cefazolin, 3 days total     #mild hyponatremia - resolved    #CAD with hx MI but has no stents    #DM Type 2  HgA1c of 6.6  ISS    #HTN - coreg, losartan       Maximiliano Birch MD    Supplementary Documentation:     Quality:  DVT Mechanical Prophylaxis:   SCDs,    DVT Pharmacologic Prophylaxis   Medication    enoxaparin (Lovenox) 40 MG/0.4ML SUBQ injection 40 mg      DVT Pharmacologic prophylaxis: Aspirin 162 mg         Code Status: Not on file  Peterson: Peterson catheter in place  Peterson Duration (in days):   Central line:    HARVEY:     Discharge is dependent on: Surgical oncology   At this point Ms. Benitez is expected to be discharge to: Home    The 21st Century Cures Act makes medical notes like these available to patients in the interest of transparency. Please be advised this is a medical document. Medical documents are intended to carry relevant information, facts as evident, and the clinical opinion of the practitioner. The medical note is intended as peer to  peer communication and may appear blunt or direct. It is written in medical language and may contain abbreviations or verbiage that are unfamiliar.             **Certification      PHYSICIAN Certification of Need for Inpatient Hospitalization - Initial Certification    Patient will require inpatient services that will reasonably be expected to span two midnight's based on the clinical documentation in H+P.   Based on patients current state of illness, I anticipate that, after discharge, patient will require TBD.

## 2024-07-21 NOTE — PHYSICAL THERAPY NOTE
PHYSICAL THERAPY EVALUATION - INPATIENT     Room Number: 7617/7617-A  Evaluation Date: 7/21/2024  Type of Evaluation: Initial  Physician Order: PT Eval and Treat    Presenting Problem: s/p radical abdominal hysterectomy with bilateral salpingo-oophorectomies and partial vaginectomy. Pelvic lymphadenectomy. Bilateral ureterolysis. on 7/20/24  Co-Morbidities : DM, HTN, MI, leg bone surgery  Reason for Therapy: Mobility Dysfunction and Discharge Planning    PHYSICAL THERAPY ASSESSMENT   Patient is currently functioning below baseline with bed mobility, transfers, gait, and stair negotiation.  Prior to admission, patient's baseline is independent with no AD.  Patient is requiring supervision as a result of the following impairments: pain and decreased muscular endurance. Physical Therapy will continue to follow for duration of hospitalization.      Patient will benefit from continued skilled PT Services for duration of hospitalization, however, given the patient is functioning near baseline level do not anticipate skilled therapy needs at discharge .    PLAN  PT Treatment Plan: Bed mobility;Body mechanics;Endurance;Energy conservation;Patient education;Family education;Gait training;Strengthening;Stoop training;Stair training;Transfer training;Balance training  Rehab Potential : Good  Frequency (Obs): 3-5x/week  Number of Visits to Meet Established Goals: 2      CURRENT GOALS    Goal #1 Patient is able to demonstrate supine - sit EOB @ level: modified independent     Goal #2 Patient is able to demonstrate transfers Sit to/from Stand at assistance level: modified independent     Goal #3 Patient is able to ambulate 200 feet with assist device: none at assistance level: independent     Goal #4 Patient is able to stair climb 4 stairs with supervision   Goal #5    Goal #6    Goal Comments: Goals established on 7/21/2024      PHYSICAL THERAPY MEDICAL/SOCIAL HISTORY  History related to current admission: Patient is a 63 year  old female admitted on 7/18/2024 from home for abdominal pain, vaginal bleeding with clots.  Pt is now s/p radical abdominal hysterectomy with bilateral salpingo-oophorectomies and partial vaginectomy. Pelvic lymphadenectomy. Bilateral ureterolysis. on 7/20/24.      HOME SITUATION  Type of Home: House   Home Layout: Two level;Able to live on main level  Stairs to Enter : 2  Railing: Yes  Stairs to Bedroom:  (flight)  Railing: Yes    Lives With: Daughter  Drives: Yes  Patient Owned Equipment: Cane       Prior Level of [place]: Pt is typically independent with ADLs, ambulates with no AD, and drives.     SUBJECTIVE  \"Everyone has been so nice here\"       OBJECTIVE  Precautions: Bed/chair alarm;Abdominal protective strategies (abdominal binder)  Fall Risk: Standard fall risk    WEIGHT BEARING RESTRICTION  Weight Bearing Restriction: None                PAIN ASSESSMENT  Rating: 3  Location: abdomen  Management Techniques: Activity promotion;Relaxation;Repositioning    COGNITION  Overall Cognitive Status:  WFL - within functional limits    RANGE OF MOTION AND STRENGTH ASSESSMENT  Upper extremity ROM and strength are within functional limits     Lower extremity ROM is within functional limits     Lower extremity strength is within functional limits       BALANCE  Static Sitting: Good  Dynamic Sitting: Good  Static Standing: Fair -  Dynamic Standing: Fair -    ADDITIONAL TESTS                                    ACTIVITY TOLERANCE   Denies dizziness, vitals stable                      O2 WALK       NEUROLOGICAL FINDINGS                        AM-PAC '6-Clicks' INPATIENT SHORT FORM - BASIC MOBILITY  How much difficulty does the patient currently have...  Patient Difficulty: Turning over in bed (including adjusting bedclothes, sheets and blankets)?: A Little   Patient Difficulty: Sitting down on and standing up from a chair with arms (e.g., wheelchair, bedside commode, etc.): None   Patient Difficulty: Moving from lying  on back to sitting on the side of the bed?: A Little   How much help from another person does the patient currently need...   Help from Another: Moving to and from a bed to a chair (including a wheelchair)?: None   Help from Another: Need to walk in hospital room?: A Little   Help from Another: Climbing 3-5 steps with a railing?: A Little       AM-PAC Score:  Raw Score: 20   Approx Degree of Impairment: 35.83%   Standardized Score (AM-PAC Scale): 47.67   CMS Modifier (G-Code): CJ    FUNCTIONAL ABILITY STATUS  Gait Assessment   Functional Mobility/Gait Assessment  Gait Assistance: Supervision  Distance (ft): 300  Assistive Device: Rolling walker  Pattern: Within Functional Limits    Skilled Therapy Provided: Per RN okay to work with pt. Pt received in chair and was agreeable to PT session.     Bed Mobility: Pt educated on log roll for bed mobility and verbalized understanding.   Rolling: NT  Supine to sit: NT   Sit to supine: NT     Transfer Mobility:  Sit to stand: mod ind    Stand to sit: mod ind   Gait = pt ambulated with RW and supervision, steady and no LOB noted    Therapist's Comments: Pt educated on role of therapy, goals for session, safety, fall prevention, abdominal protective strategies, log roll, and activity recommendations. Encouraged pt to be up to the chair during the day and ambulate 3-4 times per day with staff while in the hospital, pt verbalized understanding.     Exercise/Education Provided:  Body mechanics  Energy conservation  Functional activity tolerated  Gait training  Posture  Strengthening  Transfer training    Patient End of Session: Up in chair;Needs met;Call light within reach;RN aware of session/findings;All patient questions and concerns addressed;Family present      Patient Evaluation Complexity Level:  History Moderate - 1 or 2 personal factors and/or co-morbidities   Examination of body systems Low -  addressing 1-2 elements   Clinical Presentation Low- Stable   Clinical Decision  Making Low Complexity       PT Session Time: 25 minutes  Gait Trainin minutes

## 2024-07-21 NOTE — PROGRESS NOTES
[facility]  Progress Note    Rosario Benitez Patient Status:  Inpatient    10/2/1960 MRN [id number]   [facility] 7NE-A Attending Maximiliano Birch MD   Hosp Day # 3 PCP Petar Campbell MD     Subjective:  No acute overnight events.  Patient states she is overall doing well today.  She did have some nausea yesterday postoperatively, which seems improved today.  Tolerating liquids without vomiting.  No flatus yet.  Denies any fevers or chills. UOP adequate.    Objective/Physical Exam:  General: Alert, orientated x3.  Cooperative.  No apparent distress.  Vital Signs:  Blood pressure 149/69, pulse 94, temperature 98.1 °F (36.7 °C), temperature source Oral, resp. rate 18, height 62\", weight 159 lb 2.8 oz (72.2 kg), SpO2 91%.  Lungs: No respiratory distress.  Cardiac: Regular rate and rhythm.   Abdomen:  Soft, mildly distended, expected incisional tenderness, with no rebound or guarding.  No peritoneal signs.   Extremities:  No lower extremity edema noted.    Incision: Clean, dry, intact, no erythema      Labs:  Lab Results   Component Value Date    WBC 14.1 2024    HGB 11.6 2024    HCT 36.3 2024    .0 2024     Lab Results   Component Value Date     2024    K 3.9 2024     2024    CO2 29.0 2024    BUN 6 2024    CREATSERUM 0.73 2024     2024    CA 9.1 2024    ALKPHO 85 2024    ALT 12 2024    AST 19 2024    BILT 0.6 2024    ALB 4.3 2024    TP 6.8 2024     No results found for: \"PT\", \"INR\"    I/O last 3 completed shifts:  In: 4565 [P.O.:430; I.V.:3935; IV PIGGYBACK:200]  Out: 5040 [Urine:5000; Blood:40]  I/O this shift:  In: 1 [I.V.:1]  Out: 1000 [Urine:1000]    Assessment  Patient Active Problem List   Diagnosis    Abdominal pain of unknown etiology    Hyponatremia    Hyperglycemia    Vaginal bleeding    Endometrial mass    Acute cystitis without hematuria    Uterine neoplasm        POD 1 radical abdominal hysterectomy with bilateral salpingo-oophorectomies and partial vaginectomy, pelvic lymphadenectomy    Plan:  Patient is overall doing well postoperatively.    Continue with clear liquids until bowel function returns and nausea improved  Decrease IV fluids  Continue Reglan 3 times daily before meals, and Zofran as needed  Hemoglobin stable at 11.6  Analgesia and antiemetics as needed  Adequate urine output.  May DC Peterson.  Ambulate and up to chair  DVT prophylaxis with Lovenox  GI prophylaxis with Pepcid      Helen Pérez PA-C  7/21/2024  11:39 AM     Patient was seen with Dr. Gutierrez and discussed with Dr. Chavarria    This note was initiated by Helen Pérez.  The PA saw the patient in conjunction with me. The PA performed a history, exam, and developed the assessment and plan in conjunction with me. I agree with the above note and have made changes which reflect my own history and physical, if necessary.    Nicole Gutierrez MD  [facility]  7/21/2024  7:26 PM

## 2024-07-21 NOTE — PLAN OF CARE
Assumed care at approx 0730.  Alert and oriented x4.  On room air, respirations even and unlabored.  NSR on tele.  Pain controlled with meds.  Midline incision intact, dressing C/D/I, old serosanguineous drainage noted, topifoam and binder in place.  Passing gas. Nausea controlled with medications.  Peterson in place this AM, urine output meets goals, see flowsheets. Peterson out at approx 1800.  Ambulating after set up.  Tolerating soft diet.  Safety precautions maintained, patient reports needs met, call light within reach.      Problem: Diabetes/Glucose Control  Goal: Glucose maintained within prescribed range  Description: INTERVENTIONS:  - Monitor Blood Glucose as ordered  - Assess for signs and symptoms of hyperglycemia and hypoglycemia  - Administer ordered medications to maintain glucose within target range  - Assess barriers to adequate nutritional intake and initiate nutrition consult as needed  - Instruct patient on self management of diabetes  Outcome: Progressing     Problem: PAIN - ADULT  Goal: Verbalizes/displays adequate comfort level or patient's stated pain goal  Description: INTERVENTIONS:  - Encourage pt to monitor pain and request assistance  - Assess pain using appropriate pain scale  - Administer analgesics based on type and severity of pain and evaluate response  - Implement non-pharmacological measures as appropriate and evaluate response  - Consider cultural and social influences on pain and pain management  - Manage/alleviate anxiety  - Utilize distraction and/or relaxation techniques  - Monitor for opioid side effects  - Notify MD/LIP if interventions unsuccessful or patient reports new pain  - Anticipate increased pain with activity and pre-medicate as appropriate  Outcome: Progressing

## 2024-07-22 VITALS
HEART RATE: 82 BPM | DIASTOLIC BLOOD PRESSURE: 73 MMHG | OXYGEN SATURATION: 95 % | BODY MASS INDEX: 29.3 KG/M2 | SYSTOLIC BLOOD PRESSURE: 135 MMHG | WEIGHT: 159.19 LBS | HEIGHT: 62 IN | TEMPERATURE: 98 F | RESPIRATION RATE: 17 BRPM

## 2024-07-22 LAB
ERYTHROCYTE [DISTWIDTH] IN BLOOD BY AUTOMATED COUNT: 13.1 %
GLUCOSE BLD-MCNC: 133 MG/DL (ref 70–99)
GLUCOSE BLD-MCNC: 141 MG/DL (ref 70–99)
HCT VFR BLD AUTO: 32.8 %
HCT VFR BLD AUTO: 34.5 %
HGB BLD-MCNC: 10.8 G/DL
HGB BLD-MCNC: 11.1 G/DL
MAGNESIUM SERPL-MCNC: 1.7 MG/DL (ref 1.6–2.6)
MCH RBC QN AUTO: 29.6 PG (ref 26–34)
MCHC RBC AUTO-ENTMCNC: 32.2 G/DL (ref 31–37)
MCV RBC AUTO: 92 FL
PLATELET # BLD AUTO: 297 10(3)UL (ref 150–450)
RBC # BLD AUTO: 3.75 X10(6)UL
WBC # BLD AUTO: 11 X10(3) UL (ref 4–11)

## 2024-07-22 PROCEDURE — 99239 HOSP IP/OBS DSCHRG MGMT >30: CPT | Performed by: HOSPITALIST

## 2024-07-22 RX ORDER — OXYCODONE HYDROCHLORIDE 5 MG/1
5 TABLET ORAL EVERY 6 HOURS PRN
Qty: 10 TABLET | Refills: 0 | Status: SHIPPED | OUTPATIENT
Start: 2024-07-22 | End: 2024-07-25 | Stop reason: CLARIF

## 2024-07-22 RX ORDER — ONDANSETRON 4 MG/1
4 TABLET, ORALLY DISINTEGRATING ORAL EVERY 8 HOURS PRN
Qty: 10 TABLET | Refills: 0 | Status: SHIPPED | OUTPATIENT
Start: 2024-07-22 | End: 2024-07-25 | Stop reason: CLARIF

## 2024-07-22 RX ORDER — ENOXAPARIN SODIUM 100 MG/ML
40 INJECTION SUBCUTANEOUS DAILY
Qty: 25 EACH | Refills: 0 | Status: SHIPPED | OUTPATIENT
Start: 2024-07-22

## 2024-07-22 NOTE — PLAN OF CARE
Assumed cared at approx 1930  A/Ox4, RA, NSR on tele. VSS.  Pain managed w/ x1 PRN Oxycodone.  Surgical incision intact; dressing c/d/I; Topifoam and binder in place.  Up to bathroom SBA.  Urine output meeting goals; See flowsheets.  Tolerating diet.  Belching and passing gas. No BM.  Denies nausea.  Comfort measures maintained. Family at bedside.  Pt updated on POC.

## 2024-07-22 NOTE — DISCHARGE SUMMARY
[facility]     Rosario Benitez Patient Status:  Inpatient    10/2/1960 MRN [id number]   Location [facility] 7NE-A Attending Maximiliano Birch MD   Hosp Day # 4 PCP Petar Campbell MD     Date of Admission: 2024  Date of Discharge:   2024    Discharge Disposition: Final discharge disposition not confirmed    Discharge Diagnosis:    #Post menopausal Vaginal bleeding  #Endometrial mass; Uterine neoplasm   #Right eye pain  #UTI  #mild hyponatremia   #CAD with hx MI but has no stents  #DM Type 2   #HTN    History of Present Illness: Rosario Benitez is a 63 year old female with several days (since Monday) of vaginal bleeding with clots.  Also has had itching, urinary frequency, urinary pressure, and urgency.  No fevers or chills.  Was having lower pelvic pain too but it's now resolved.  Transferred here from [facility].    Brief Synopsis:   Patient presented with vaginal bleeding.  Found to have an endometrial mass with concern for uterine neoplasm.  Patient underwent a radical hysterectomy on .  Her post op course was uncomplicated, diet advanced, pain controlled, cleared by surgery for discharge.  Has outpatient f/u this Thursday, path report still pending.  All questions addressed with patient/family prior to discharge, they are agreeable to plan of care.  She can resume her regular home meds at discharge.  Discussed f/u with pcp.      Lace+ Score: 33  59-90 High Risk  29-58 Medium Risk  0-28   Low Risk       TCM Follow-Up Recommendation:  LACE 29-58: Moderate Risk of readmission after discharge from the hospital.      Procedures during hospitalization:   Radical hysterectomy with pelvic lymphadenopathy -     Lab/Test results pending at Discharge:   None    Consultants:  Gyn onc     Discharge Medication List:     Discharge Medications        START taking these medications        Instructions Prescription details   enoxaparin 40 MG/0.4ML Sosy  Commonly known as:  Lovenox      Inject 0.4 mL (40 mg total) into the skin daily.   Quantity: 25 each  Refills: 0     ondansetron 4 MG Tbdp  Commonly known as: Zofran-ODT      Take 1 tablet (4 mg total) by mouth every 8 (eight) hours as needed for Nausea.   Quantity: 10 tablet  Refills: 0     oxyCODONE 5 MG Tabs      Take 1 tablet (5 mg total) by mouth every 6 (six) hours as needed.   Quantity: 10 tablet  Refills: 0            CONTINUE taking these medications        Instructions Prescription details   aspirin 81 MG Chew      Chew 1 tablet (81 mg total) by mouth daily.   Refills: 0     carvedilol 3.125 MG Tabs  Commonly known as: Coreg      Take 1 tablet (3.125 mg total) by mouth 2 (two) times daily with meals.   Refills: 0     losartan 25 MG Tabs  Commonly known as: Cozaar      Take 1 tablet (25 mg total) by mouth daily.   Refills: 0     metFORMIN 500 MG Tabs  Commonly known as: Glucophage      Take 1 tablet (500 mg total) by mouth 2 (two) times daily with meals.   Refills: 0               Where to Get Your Medications        These medications were sent to [place], [place] - [address] [place] [address], [address] [phone], [phone]  [address] [facility], [place] [address], [facility] [address]      Phone: [phone]   enoxaparin 40 MG/0.4ML Sosy  ondansetron 4 MG Tbdp  oxyCODONE 5 MG Tabs         [place]    Follow-up appointment:   Petar Campbell MD  [address] [facility]  [address]  [facility] [address] [phone]    Follow up in 1 week(s)      Tex Chavarria MD  [address] [facility]  [address]  [facility] [address]  [phone]    Follow up  Office will call you lois to schedule follow up appt.    Appointments for Next 30 Days 7/22/2024 - 8/21/2024      None            Vital signs:  Temp:  [97.9 °F (36.6 °C)-98.1 °F (36.7 °C)] 98.1 °F (36.7 °C)  Pulse:  [69-97] 82  Resp:  [17-20] 17  BP: (135-159)/(62-80) 135/73  SpO2:  [92 %-96 %] 95 %    Physical Exam:    General: No acute distress, pleasant    Lungs: clear to  auscultation  Cardiovascular: S1, S2, RRR  Abdomen: Soft, NT, ND    -----------------------------------------------------------------------------------------------  PATIENT DISCHARGE INSTRUCTIONS: See electronic chart    Maximiliano Birch MD    Total time spent on discharge plannin minutes     The  Century Cures Act makes medical notes like these available to patients in the interest of transparency. Please be advised this is a medical document. Medical documents are intended to carry relevant information, facts as evident, and the clinical opinion of the practitioner. The medical note is intended as peer to peer communication and may appear blunt or direct. It is written in medical language and may contain abbreviations or verbiage that are unfamiliar.

## 2024-07-22 NOTE — PAYOR COMM NOTE
--------------  DISCHARGE REVIEW    Payor: [employer]AY[employer]  Subscriber #:  [id number]  Authorization Number: [id number]    Admit date: 24  Admit time:   5:59 PM  Discharge Date: 2024  3:08 PM       [facility]     Rosario Benitez Patient Status:  Inpatient    10/2/1960 MRN [id number]   [facility] 7NE-A Attending Maximiliano Birch MD   Hosp Day # 4 PCP Petar Campbell MD     Date of Admission: 2024  Date of Discharge:   2024    Discharge Disposition: Final discharge disposition not confirmed    Discharge Diagnosis:    #Post menopausal Vaginal bleeding  #Endometrial mass; Uterine neoplasm   #Right eye pain  #UTI  #mild hyponatremia   #CAD with hx MI but has no stents  #DM Type 2   #HTN    History of Present Illness: Rosario Benitez is a 63 year old female with several days (since Monday) of vaginal bleeding with clots.  Also has had itching, urinary frequency, urinary pressure, and urgency.  No fevers or chills.  Was having lower pelvic pain too but it's now resolved.  Transferred here from [facility].    Brief Synopsis:   Patient presented with vaginal bleeding.  Found to have an endometrial mass with concern for uterine neoplasm.  Patient underwent a radical hysterectomy on .  Her post op course was uncomplicated, diet advanced, pain controlled, cleared by surgery for discharge.  Has outpatient f/u this Thursday, path report still pending.  All questions addressed with patient/family prior to discharge, they are agreeable to plan of care.  She can resume her regular home meds at discharge.  Discussed f/u with pcp.      Lace+ Score: 33  59-90 High Risk  29-58 Medium Risk  0-28   Low Risk       TCM Follow-Up Recommendation:  LACE 29-58: Moderate Risk of readmission after discharge from the hospital.      Procedures during hospitalization:   Radical hysterectomy with pelvic lymphadenopathy -     Lab/Test results pending at Discharge:    None    Consultants:  Gyn onc     Discharge Medication List:     Discharge Medications        START taking these medications        Instructions Prescription details   enoxaparin 40 MG/0.4ML Sosy  Commonly known as: Lovenox      Inject 0.4 mL (40 mg total) into the skin daily.   Quantity: 25 each  Refills: 0     ondansetron 4 MG Tbdp  Commonly known as: Zofran-ODT      Take 1 tablet (4 mg total) by mouth every 8 (eight) hours as needed for Nausea.   Quantity: 10 tablet  Refills: 0     oxyCODONE 5 MG Tabs      Take 1 tablet (5 mg total) by mouth every 6 (six) hours as needed.   Quantity: 10 tablet  Refills: 0            CONTINUE taking these medications        Instructions Prescription details   aspirin 81 MG Chew      Chew 1 tablet (81 mg total) by mouth daily.   Refills: 0     carvedilol 3.125 MG Tabs  Commonly known as: Coreg      Take 1 tablet (3.125 mg total) by mouth 2 (two) times daily with meals.   Refills: 0     losartan 25 MG Tabs  Commonly known as: Cozaar      Take 1 tablet (25 mg total) by mouth daily.   Refills: 0     metFORMIN 500 MG Tabs  Commonly known as: Glucophage      Take 1 tablet (500 mg total) by mouth 2 (two) times daily with meals.   Refills: 0               Where to Get Your Medications        These medications were sent to [address], [facility], [place] [address] [phone], [phone]  [address] [address]      Phone: [phone]   enoxaparin 40 MG/0.4ML Sosy  ondansetron 4 MG Tbdp  oxyCODONE 5 MG Tabs         [place]    Follow-up appointment:   Petar Campbell MD  [address] [facility]  [address]  [facility] [address] [phone]    Follow up in 1 week(s)      Tex Chavarria MD  [address] [facility]  [address]  [facility] [address] [phone]    Follow up  Office will call you lois to schedule follow up appt.    Appointments for Next 30 Days 7/22/2024 - 8/21/2024      None            Vital signs:  Temp:  [97.9 °F (36.6  °C)-98.1 °F (36.7 °C)] 98.1 °F (36.7 °C)  Pulse:  [69-97] 82  Resp:  [17-20] 17  BP: (135-159)/(62-80) 135/73  SpO2:  [92 %-96 %] 95 %    Physical Exam:    General: No acute distress, pleasant    Lungs: clear to auscultation  Cardiovascular: S1, S2, RRR  Abdomen: Soft, NT, ND    -----------------------------------------------------------------------------------------------  PATIENT DISCHARGE INSTRUCTIONS: See electronic chart    Maximiliano Birch MD    Total time spent on discharge plannin minutes     The  Century Cures Act makes medical notes like these available to patients in the interest of transparency. Please be advised this is a medical document. Medical documents are intended to carry relevant information, facts as evident, and the clinical opinion of the practitioner. The medical note is intended as peer to peer communication and may appear blunt or direct. It is written in medical language and may contain abbreviations or verbiage that are unfamiliar.       Electronically signed by Maximiliano Birch MD on 2024 12:02 PM         REVIEWER COMMENTS

## 2024-07-22 NOTE — PLAN OF CARE
Assumed care at 0700. Pt A/O x4. RA. NSR on tele. VSS.  Pain well controlled with PO oxy. Midline incision with topifoam and abd binder. Dilaudid PCA discontinued.  All consults cleared for discharge. IV removed. Tele removed. Pt discharged home via wheel chair.

## 2024-07-22 NOTE — PAYOR COMM NOTE
--------------  7/19 - 21 CONTINUED STAY REVIEW    Payor: CHAYA [employer]ARRERA  Subscriber #:  [id number]  Authorization Number: [id number]    7/19:      HOSPITALIST:    Medications:   Scheduled Medications[]Expand by Default    cefTRIAXone  1 g Intravenous Q24H    insulin aspart  1-5 Units Subcutaneous TID AC and HS    carvedilol  3.125 mg Oral BID with meals    losartan  25 mg Oral Daily          Assessment & Plan:  #Vaginal bleeding  #Pelvic mass;  Surgical oncology and ob/gyn consult  Likely needs surgery     #UTI  Short course Abx  Cefazolin, 3 days total      #mild hyponatremia - resolved     #CAD with hx MI but has no stents     #DM Type 2  HgA1c of 6.6  ISS     #HTN - coreg, losartan         7/20:    OPERATIVE REPORT     PREOPERATIVE DIAGNOSIS:  1.       Vaginal bleeding.  2.       Uterine neoplasm.  POSTOPERATIVE DIAGNOSIS:  1.       Vaginal bleeding.  2.       Uterine neoplasm.  3.       Pending final pathology report.  PROCEDURE:  1.       Radical hysterectomy with pelvic lymphadenectomy.  2.       Bilateral ureterolysis.    OPERATIVE TECHNIQUE:  Patient was brought to the operating room and was placed in supine position.  She was given general anesthesia by the anesthesiology service.  Sequential compression boots were placed.  The patient received preoperative intravenous antibiotic prophylaxis.  She was prepped and draped in normal sterile fashion.  A low midline incision extending above the umbilicus was made and deepened.  The fascia was incised.  The peritoneal cavity was entered.  I used a Santamaria retractor to help in exposure.  There was no evidence for distant metastatic disease.  The uterus was enlarged and multilobulated.  I proceeded with bilateral ureterolysis, identifying the ureter and preserving its courses throughout the procedure.  Radical hysterectomy was then proceeded with, taking surrounding tissue toward the specimen, dividing both ovarian vessels using vascular load stapling.  I subsequently  incised both broad ligaments and clipped and divided the round ligament.  I then elevated the bladder flap.  I then incised the peritoneum overlying the uterus, elevating a bladder flap.  This was extended toward the upper vagina.  Both uterine vessels were divided between clamps and ligated with 0 Vicryl suture.  The upper vagina was then divided using electrocautery and Sonicision, and the specimen was removed in toto to include upper vagina, cervix, uterus, bilateral tubes and ovaries, and sent to Pathology for permanent section evaluation.  The vaginal cuff was then reapproximated using interrupted figure-of-8 Vicryl sutures.  Hemostasis was achieved and maintained, and the pelvis was copiously irrigated.  Next, I performed bilateral pelvic lymphadenectomies.  Grossly, pelvic lymph nodes appeared unremarkable.     7/21:    SURGERY:    No acute overnight events.  Patient states she is overall doing well today.  She did have some nausea yesterday postoperatively, which seems improved today.  Tolerating liquids without vomiting.  No flatus yet.  Denies any fevers or chills. UOP adequate.     Objective/Physical Exam:  General: Alert, orientated x3.  Cooperative.  No apparent distress.  Vital Signs:  Blood pressure 149/69, pulse 94, temperature 98.1 °F (36.7 °C), temperature source Oral, resp. rate 18, height 62\", weight 159 lb 2.8 oz (72.2 kg), SpO2 91%.    Abdomen:  Soft, mildly distended, expected incisional tenderness, with no rebound or guarding.  No peritoneal signs.     Incision: Clean, dry, intact, no erythema      Labs:        Lab Results   Component Value Date     WBC 14.1 07/21/2024     HGB 11.6 07/21/2024     HCT 36.3 07/21/2024     .0 07/21/2024            Lab Results   Component Value Date      07/21/2024     K 3.9 07/21/2024      07/21/2024     CO2 29.0 07/21/2024     BUN 6 07/21/2024     CREATSERUM 0.73 07/21/2024      07/21/2024     CA 9.1 07/21/2024     ALKPHO 85 07/21/2024      ALT 12 07/21/2024     AST 19 07/21/2024     BILT 0.6 07/21/2024     ALB 4.3 07/21/2024     TP 6.8 07/21/2024       Scheduled Medications[]Expand by Default    enoxaparin  40 mg Subcutaneous Daily    acetaminophen  1,000 mg Oral q6h    ciprofloxacin  1 drop Right Eye Q2H While awake    scopolamine  1 patch Transdermal Q72H    metoclopramide  10 mg Oral TID AC     Or    metoclopramide  10 mg Intravenous TID AC    hydrocortisone   Topical BID    ceFAZolin  1 g Intravenous Q8H    insulin aspart  1-5 Units Subcutaneous TID AC and HS    carvedilol  3.125 mg Oral BID with meals    losartan  25 mg Oral Daily         /HR  DILAUDID PCA        POD 1 radical abdominal hysterectomy with bilateral salpingo-oophorectomies and partial vaginectomy, pelvic lymphadenectomy     Plan:  Patient is overall doing well postoperatively.    Continue with clear liquids until bowel function returns and nausea improved  Decrease IV fluids  Continue Reglan 3 times daily before meals, and Zofran as needed  Hemoglobin stable at 11.6  Analgesia and antiemetics as needed  Adequate urine output.  May DC Peterson.  Ambulate and up to chair  DVT prophylaxis with Lovenox  GI prophylaxis with Pepcid     MEDICATIONS ADMINISTERED IN LAST 1 DAY:    ceFAZolin (Ancef) 1 g in dextrose 5% 100mL IVPB-ADD       Date Action Dose Route User    7/22/2024 0920 New Bag 1 g Intravenous Pramod Ramsey RN    7/22/2024 0046 New Bag 1 g Intravenous Hanh Whelan RN    7/21/2024 1659 New Bag 1 g Intravenous Zeny Jacobo RN     enoxaparin (Lovenox) 40 MG/0.4ML SUBQ injection 40 mg       Date Action Dose Route User    7/22/2024 0920 Given 40 mg Subcutaneous (Left Lower Abdomen) Pramod Ramsey RN     metoclopramide (Reglan) tab 10 mg       Date Action Dose Route User    7/22/2024 0551 Given 10 mg Oral Hanh Whelan RN    7/21/2024 1659 Given 10 mg Oral Zeny Jacobo RN    7/21/2024 1220 Given 10 mg Oral Zeny Jacobo RN       sodium  chloride 0.9% infusion       Date Action Dose Route User    7/22/2024 0102 New Bag (none) Intravenous Hanh Whelan RN    7/21/2024 1613 Rate/Dose Change (none) Intravenous Zeny Jacobo, RN

## 2024-07-22 NOTE — OPERATIVE REPORT
[facility]    PATIENT'S NAME: SYMONE SMITH   ATTENDING PHYSICIAN: Maximiliano Birch MD   OPERATING PHYSICIAN: Tex Chavarria MD   PATIENT ACCOUNT#:   [id number]    LOCATION:  [address] [place] [address]  MEDICAL RECORD #:   [id number]       YOB: 1960  ADMISSION DATE:       07/18/2024      OPERATION DATE:  07/20/2024    OPERATIVE REPORT    PREOPERATIVE DIAGNOSIS:  1.   Vaginal bleeding.  2.   Uterine neoplasm.    POSTOPERATIVE DIAGNOSIS:  1.   Vaginal bleeding.  2.   Uterine neoplasm.  3.   Pending final pathology report.    PROCEDURE:  1.   Radical hysterectomy with pelvic lymphadenectomy.  2.   Bilateral ureterolysis.    ASSISTANT:  Darryl Lebron RSA; Juan Martini MS4    ANESTHESIA:  General.    INDICATIONS:  Patient is a 63-year-old woman who presented to the ED with vaginal bleeding.  Imaging suggested a uterine mass extending to vagina.  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  She was taken to the operating room with these findings and ongoing bleeding.  The surgical treatment matter was discussed with her including indications and risks.    OPERATIVE TECHNIQUE:  Patient was brought to the operating room and was placed in supine position.  She was given general anesthesia by the anesthesiology service.  Sequential compression boots were placed.  The patient received preoperative intravenous antibiotic prophylaxis.  She was prepped and draped in normal sterile fashion.  A low midline incision extending above the umbilicus was made and deepened.  The fascia was incised.  The peritoneal cavity was entered.  I used a Santamaria retractor to help in exposure.  There was no evidence for distant metastatic disease.  The uterus was enlarged and multilobulated.  I proceeded with bilateral ureterolysis, identifying the ureter and preserving its courses throughout the procedure.  Radical hysterectomy was then proceeded with, taking surrounding  tissue toward the specimen, dividing both ovarian vessels using vascular load stapling.  I subsequently incised both broad ligaments and clipped and divided the round ligament.  I then elevated the bladder flap.  I then incised the peritoneum overlying the uterus, elevating a bladder flap.  This was extended toward the upper vagina.  Both uterine vessels were divided between clamps and ligated with 0 Vicryl suture.  The upper vagina was then divided using electrocautery and Sonicision, and the specimen was removed in toto to include upper vagina, cervix, uterus, bilateral tubes and ovaries, and sent to Pathology for permanent section evaluation.  The vaginal cuff was then reapproximated using interrupted figure-of-8 Vicryl sutures.  Hemostasis was achieved and maintained, and the pelvis was copiously irrigated.  Next, I performed bilateral pelvic lymphadenectomies.  Grossly, pelvic lymph nodes appeared unremarkable.    Again, hemostasis was achieved and maintained.  The pelvis was irrigated.  The fascia was then reapproximated using #1 PDS.  The skin was stapled.  Sterile dressings were then applied.  Patient tolerated the procedure well without immediate complications.  She was extubated in the operating room and was sent in stable condition to recovery room.  Counts were correct.  I was present throughout the procedure.    Dictated By Tex Chavarria MD  d: 07/20/2024 17:26:06  t: 07/20/2024 19:49:21  Job [id number]  [facility]/

## 2024-07-22 NOTE — PROGRESS NOTES
HPI     POD # 2  Patient is sitting up in bed and states that her pain is controlled. She denies nausea and vomiting. Her hgb is stable. Patient is able to ambulate and void without amin.       /68 (BP Location: Right arm)   Pulse 90   Temp 98 °F (36.7 °C) (Oral)   Resp 20   Ht 1.575 m (5' 2\")   Wt 72.2 kg (159 lb 2.8 oz)   SpO2 92%   BMI 29.11 kg/m²       Intake/Output Summary (Last 24 hours) at 7/22/2024 0906  Last data filed at 7/22/2024 0724  Gross per 24 hour   Intake 0 ml   Output 3650 ml   Net -3650 ml        Lab Results   Component Value Date    HGB 10.8 07/22/2024    HCT 32.8 07/22/2024    MG 1.7 07/22/2024       Constitutional:  NAD.   Eyes: non-icteric.    Abdomen: Soft, non-tender, non-distended. Incision healing well without drainage or erythema.  Musculoskeletal: no edema.     - No acute surgical issues.   - Patient tolerating diet, okay for low fiber soft diet.   - Hgb is stable. Patient is okay for discharge.   - Continue DVT prophylaxis with Lovenox.   - Okay to discharge with scheduled tylenol and oxy prn.   - Patient to follow-up in clinic on Thursday.       Patient seen and examined by Dr. Chavarria.   DALLAS Flores  7/22/2024  9:06 AM

## 2024-07-22 NOTE — DISCHARGE INSTRUCTIONS
Post Op Instructions    Thank you for choosing the Surgical Oncology team at [facility].  Managing Your Pain  Take your medications as directed and as needed. You may also take 1000 mg of acetaminophen (Tylenol®) every 6 hours as needed for pain.  Call our office if the medication prescribed for you doesn't ease your pain.  Don't drive or drink alcohol while you're taking prescription pain medication  Pain medication should help you resume your normal activities. Take enough medication to do your exercises comfortably. However, it's normal for your pain to increase a little as you start to be more active.  Keep track of when you take your pain medication. It works best 30 to 45 minutes after you take it. Taking it when your pain first begins is better than waiting for the pain to get worse.  Pain medication may cause constipation  Managing Constipation  Go to the bathroom at the same time every day.   Exercise. Walking is an excellent form of exercise.  Drink 8 (8-ounce) glasses (2 liters) of liquids daily, if you can. Drink water, juices (such as prune juice), soups, ice cream shakes, and other drinks that don't have caffeine.   If you haven't had a bowel movement in 3 days, call our office  Caring for Your Incision  It's normal for the skin below your incision to feel numb. This happens because some of the nerves were cut during your surgery. The numbness will go away over time.  Leave the dressing on for 48 hours after surgery. The clear outer dressing (Tegaderm) can then come off.  The staples and/or sutures (stitches) in your incision will be removed in the office after surgery. This is usually about 2 weeks after you're discharged.   If you go home with Steri-Strips on your incision, they will loosen and fall off by themselves. If they haven't fallen off within 14 days, you can take them off.  If the area around your incision is red, puffy, or if you have any drainage from your incision, contact  our office.  Showering  You may shower 48 hours after surgery. When you shower, use mild soap to gently wash your incision. After you shower, pat the area dry with a clean towel. Don't rub over your incision. Leave your incision uncovered, unless there's drainage.  Avoid tub baths until your surgeon says it's okay.  Eating and Drinking  You may resume a low fiber soft diet when you go home. You may not be able to eat as much as you did before your surgery. Try to eat 4 to 6 small meals a day.  Drink plenty of liquids. Try to drink 8 (8-ounce) glasses of liquids every day. Don't drink alcohol until you check with your surgeon.  Activity and Exercise  Remember, recovery after surgery takes several weeks. It is common to feel tired or fatigued. Rest as needed.   Doing aerobic exercise, such as walking and stair climbing, will help you gain strength and feel better. Gradually increase the distance you walk. Rest or stop as needed.  Don't lift anything heavier than 10 pounds for at least 8 weeks after your surgery or until your surgeon says it's okay.   Avoid strenuous activity and exercises until your surgeon says it's okay.  Ask your surgeon when it is okay for you to drive.   Ask your surgeon when you can return to work.  When to Call:  Let us know if you experience the following symptoms:  Fever of 100.4° F (38°C) or higher  Cloudy or smelly drainage from the incision site  The skin around your incision is warm/red/swollen  Sudden increase in pain or new pain  Shaking chills  Fast pulse  Shortness of breath or chest pain  Nausea or vomiting.   Diarrhea  Constipation that isn't relieved in 3 days  Signs of a bladder infection (urinating more often than usual, burning while urinating, bleeding or hesitancy while urinating).  Any new or unexplained symptoms    Our office will contact you for a follow up appointment.     Please arrive at the following location:  [place]: [address]  [id number]  [facility]: [address] [place] [address]  Please call us at [phone] if you are unable to make it to your appointment or if you have any questions.

## 2024-07-23 ENCOUNTER — TELEPHONE (OUTPATIENT)
Dept: SURGERY | Facility: CLINIC | Age: 64
End: 2024-07-23

## 2024-07-23 NOTE — TELEPHONE ENCOUNTER
Called to check on patient after hospital discharge.  Patient having minimal pain and is controlled on medication.  Patient having no issues urinating, but has not had a bowel movement.  Patient currently is not taking anything.  I let her know she could take miralax.  Patient has no fevers and incisional site intact.   Patient confirmed post op appointment for Thursday.

## 2024-07-23 NOTE — CONSULTS
[facility]        Patient Name:  Rosario Benitez   YOB: 1960   Gender:  Female   Appt Date:  7/25/2024   Provider:  DALLAS Flores     PATIENT PROVIDERS  Primary Care Provider:Petar Campbell MD   Address: [address]  [facility] [address]   Phone #: [phone]       CHIEF COMPLAINT  Post Op visit      PROBLEMS  Reviewed   Patient Active Problem List   Diagnosis    Abdominal pain of unknown etiology    Hyponatremia    Hyperglycemia    Vaginal bleeding    Endometrial mass    Acute cystitis without hematuria    Uterine neoplasm        History of Present Illness:  Patient is a 63 year old woman who was consulted for surgical oncology recommendations inpatient regarding a newly diagnosed endometrial/pelvic mass.      Patient reported to the ED after having new onset right pelvic pain with pressure and  vaginal spotting started on Monday. She started passing large clots that saturated pads, diapers and a towel which prompted her to report to the ED. CT A/P was performed in the ED which showed a low attenuation collection vs mass in the endometrial canal measuring 8.6 x 7.2 x 11.2 cm extending into the lower vaginal canal. Hgb WNL 14.2. CMP with mild hyponatremia.  was 25. Patient reports her last gynecologic exam was a long time ago. She confirms colonoscopy within the last 5 years and mammogram last year. She expresses that the vaginal bleeding has improved since being admitted. She denies personal history of cancer. She does have a daughter who was recently diagnosed with rectal cancer.     Patient experienced a drop in hemoglobin and continuous vaginal bleeding and was taken in for surgery.     07/20/2024: S/p Radical abbdominal hysterectomy with bilateral salpingo-oophorectomies and partial vaginectomy. Pelvic lymphadenectomy. Bilateral ureterolysis.     Interval history:  Patient has been doing great since discharge. She has only required  oxycodone once and then using tylenol sparingly. Patient denies any blood in urine or stool. She states her bowel movements have been regular. Denies any heavy lifting. She has been doing her Lovenox injections daily and has been ambulating around the house.      Vital Signs:  There were no vitals taken for this visit.     Medications Reviewed:    Current Outpatient Medications:     enoxaparin 40 MG/0.4ML Injection Solution Prefilled Syringe, Inject 0.4 mL (40 mg total) into the skin daily., Disp: 25 each, Rfl: 0    ondansetron 4 MG Oral Tablet Dispersible, Take 1 tablet (4 mg total) by mouth every 8 (eight) hours as needed for Nausea., Disp: 10 tablet, Rfl: 0    oxyCODONE 5 MG Oral Tab, Take 1 tablet (5 mg total) by mouth every 6 (six) hours as needed., Disp: 10 tablet, Rfl: 0    metFORMIN 500 MG Oral Tab, Take 1 tablet (500 mg total) by mouth 2 (two) times daily with meals., Disp: , Rfl:     carvedilol 3.125 MG Oral Tab, Take 1 tablet (3.125 mg total) by mouth 2 (two) times daily with meals., Disp: , Rfl:     losartan 25 MG Oral Tab, Take 1 tablet (25 mg total) by mouth daily., Disp: , Rfl:     aspirin 81 MG Oral Chew Tab, Chew 1 tablet (81 mg total) by mouth daily., Disp: , Rfl:      Allergies Reviewed:  No Known Allergies     History:  Reviewed:  Past Medical History:    Diabetes (HCC)    Essential hypertension    Heart attack (HCC)      Reviewed:  Past Surgical History:   Procedure Laterality Date    Anesth,radical leg bone surgery        Reviewed Social History:  Social History     Socioeconomic History    Marital status: Single   Tobacco Use    Smoking status: Former     Types: Cigarettes    Smokeless tobacco: Never   Substance and Sexual Activity    Alcohol use: Yes    Drug use: Never     Social Determinants of Health     Food Insecurity: No Food Insecurity (7/18/2024)    Food Insecurity     Food Insecurity: Never true   Transportation Needs: No Transportation Needs (7/18/2024)    Transportation Needs      Lack of Transportation: No   Housing Stability: Low Risk  (7/18/2024)    Housing Stability     Housing Instability: No      Reviewed:  No family history on file.   Review of Systems:  Doing well post-operatively  Denies fever or chills  Denies chest pain or SOB  Denies abdominal pain or N/V  Denies dysuria or hematuria  Denies warmth, erythema, or drainage at incision       Physical Examination:  Constitutional: General Appearance: healthy-appearing, well-nourished, and well-developed. Level of Distress: NAD.   Neck: Neck: supple.   Lungs: No increased work of breathing.   Abdomen: Inspection and Palpation: no masses, tenderness (no guarding, no rebound), or CVA tenderness and soft and non-distended. Surgical incision with staples intact.   Musculoskeletal: Extremities: no edema.   Skin: Inspection and palpation: no jaundice.      Document Review:  Lab Results   Component Value Date    WBC 10.2 07/25/2024    HGB 11.6 07/25/2024    HCT 35.1 07/25/2024    .0 07/25/2024    CREATSERUM 0.71 07/25/2024    BUN 18 07/25/2024     07/25/2024    K 4.4 07/25/2024     07/25/2024    CO2 26.0 07/25/2024     07/25/2024    CA 9.3 07/25/2024    ALB 4.4 07/25/2024    ALKPHO 87 07/25/2024    BILT 0.4 07/25/2024    TP 7.2 07/25/2024    AST 91 07/25/2024    ALT 80 07/25/2024         Procedure(s):  None performed.      Assessment / Plan:  Pelvic Mass  - No acute surgical issues  - CMP and CBC obtained, patient hgb is stabilizing post surgery. LFTs reactive from surgery. No signs of dehydration, Cr and BUN within normal limits.   - Incision sites healing well  - Pathology is still pending  - Plan for follow-up for staple removal.          Electronically Signed by: DALLAS Flores

## 2024-07-25 ENCOUNTER — OFFICE VISIT (OUTPATIENT)
Dept: SURGERY | Facility: CLINIC | Age: 64
End: 2024-07-25
Payer: MEDICARE

## 2024-07-25 VITALS
WEIGHT: 152.38 LBS | RESPIRATION RATE: 20 BRPM | SYSTOLIC BLOOD PRESSURE: 143 MMHG | BODY MASS INDEX: 28 KG/M2 | DIASTOLIC BLOOD PRESSURE: 70 MMHG | TEMPERATURE: 98 F | HEART RATE: 83 BPM

## 2024-07-25 DIAGNOSIS — N94.89 ENDOMETRIAL MASS: Primary | ICD-10-CM

## 2024-07-25 LAB
ALBUMIN SERPL-MCNC: 4.4 G/DL (ref 3.2–4.8)
ALBUMIN/GLOB SERPL: 1.6 {RATIO} (ref 1–2)
ALP LIVER SERPL-CCNC: 87 U/L
ALT SERPL-CCNC: 80 U/L
ANION GAP SERPL CALC-SCNC: 6 MMOL/L (ref 0–18)
AST SERPL-CCNC: 91 U/L (ref ?–34)
BASOPHILS # BLD AUTO: 0.04 X10(3) UL (ref 0–0.2)
BASOPHILS NFR BLD AUTO: 0.4 %
BILIRUB SERPL-MCNC: 0.4 MG/DL (ref 0.2–1.1)
BUN BLD-MCNC: 18 MG/DL (ref 9–23)
CALCIUM BLD-MCNC: 9.3 MG/DL (ref 8.7–10.4)
CHLORIDE SERPL-SCNC: 104 MMOL/L (ref 98–112)
CO2 SERPL-SCNC: 26 MMOL/L (ref 21–32)
CREAT BLD-MCNC: 0.71 MG/DL
EGFRCR SERPLBLD CKD-EPI 2021: 94 ML/MIN/1.73M2 (ref 60–?)
EOSINOPHIL # BLD AUTO: 0.22 X10(3) UL (ref 0–0.7)
EOSINOPHIL NFR BLD AUTO: 2.2 %
ERYTHROCYTE [DISTWIDTH] IN BLOOD BY AUTOMATED COUNT: 13.1 %
GLOBULIN PLAS-MCNC: 2.8 G/DL (ref 2.8–4.4)
GLUCOSE BLD-MCNC: 127 MG/DL (ref 70–99)
HCT VFR BLD AUTO: 35.1 %
HGB BLD-MCNC: 11.6 G/DL
IMM GRANULOCYTES # BLD AUTO: 0.07 X10(3) UL (ref 0–1)
IMM GRANULOCYTES NFR BLD: 0.7 %
LYMPHOCYTES # BLD AUTO: 1.99 X10(3) UL (ref 1–4)
LYMPHOCYTES NFR BLD AUTO: 19.6 %
MCH RBC QN AUTO: 29.7 PG (ref 26–34)
MCHC RBC AUTO-ENTMCNC: 33 G/DL (ref 31–37)
MCV RBC AUTO: 90 FL
MONOCYTES # BLD AUTO: 0.78 X10(3) UL (ref 0.1–1)
MONOCYTES NFR BLD AUTO: 7.7 %
NEUTROPHILS # BLD AUTO: 7.07 X10 (3) UL (ref 1.5–7.7)
NEUTROPHILS # BLD AUTO: 7.07 X10(3) UL (ref 1.5–7.7)
NEUTROPHILS NFR BLD AUTO: 69.4 %
OSMOLALITY SERPL CALC.SUM OF ELEC: 285 MOSM/KG (ref 275–295)
PLATELET # BLD AUTO: 352 10(3)UL (ref 150–450)
POTASSIUM SERPL-SCNC: 4.4 MMOL/L (ref 3.5–5.1)
PROT SERPL-MCNC: 7.2 G/DL (ref 5.7–8.2)
RBC # BLD AUTO: 3.9 X10(6)UL
SODIUM SERPL-SCNC: 136 MMOL/L (ref 136–145)
WBC # BLD AUTO: 10.2 X10(3) UL (ref 4–11)

## 2024-07-25 PROCEDURE — 3077F SYST BP >= 140 MM HG: CPT | Performed by: PHYSICIAN ASSISTANT

## 2024-07-25 PROCEDURE — 3078F DIAST BP <80 MM HG: CPT | Performed by: PHYSICIAN ASSISTANT

## 2024-07-25 PROCEDURE — 99024 POSTOP FOLLOW-UP VISIT: CPT | Performed by: PHYSICIAN ASSISTANT

## 2024-07-25 PROCEDURE — 85025 COMPLETE CBC W/AUTO DIFF WBC: CPT | Performed by: PHYSICIAN ASSISTANT

## 2024-07-25 PROCEDURE — 80053 COMPREHEN METABOLIC PANEL: CPT | Performed by: PHYSICIAN ASSISTANT

## 2024-07-25 PROCEDURE — 1111F DSCHRG MED/CURRENT MED MERGE: CPT | Performed by: PHYSICIAN ASSISTANT

## 2024-07-25 NOTE — PROGRESS NOTES
[facility]        Patient Name:  Rosario Benitez   YOB: 1960   Gender:  Female   Appt Date:  7/25/2024   Provider:  DALLAS Flores     PATIENT PROVIDERS  Primary Care Provider:Petar Campbell MD   Address: [address]  [facility] [address]   Phone #: [phone]       CHIEF COMPLAINT  Post Op visit      PROBLEMS  Reviewed   Patient Active Problem List   Diagnosis    Abdominal pain of unknown etiology    Hyponatremia    Hyperglycemia    Vaginal bleeding    Endometrial mass    Acute cystitis without hematuria    Uterine neoplasm        History of Present Illness:  Patient is a 63 year old woman who was consulted for surgical oncology recommendations inpatient regarding a newly diagnosed endometrial/pelvic mass.      Patient reported to the ED after having new onset right pelvic pain with pressure and  vaginal spotting started on Monday. She started passing large clots that saturated pads, diapers and a towel which prompted her to report to the ED. CT A/P was performed in the ED which showed a low attenuation collection vs mass in the endometrial canal measuring 8.6 x 7.2 x 11.2 cm extending into the lower vaginal canal. Hgb WNL 14.2. CMP with mild hyponatremia.  was 25. Patient reports her last gynecologic exam was a long time ago. She confirms colonoscopy within the last 5 years and mammogram last year. She expresses that the vaginal bleeding has improved since being admitted. She denies personal history of cancer. She does have a daughter who was recently diagnosed with rectal cancer.     Patient experienced a drop in hemoglobin and continuous vaginal bleeding and was taken in for surgery.     07/20/2024: S/p Radical abbdominal hysterectomy with bilateral salpingo-oophorectomies and partial vaginectomy. Pelvic lymphadenectomy. Bilateral ureterolysis.     Interval history:  Patient has been doing great since discharge. She has only required  oxycodone once and then using tylenol sparingly. Patient denies any blood in urine or stool. She states her bowel movements have been regular. Denies any heavy lifting. She has been doing her Lovenox injections daily and has been ambulating around the house.      Vital Signs:  There were no vitals taken for this visit.     Medications Reviewed:    Current Outpatient Medications:     enoxaparin 40 MG/0.4ML Injection Solution Prefilled Syringe, Inject 0.4 mL (40 mg total) into the skin daily., Disp: 25 each, Rfl: 0    ondansetron 4 MG Oral Tablet Dispersible, Take 1 tablet (4 mg total) by mouth every 8 (eight) hours as needed for Nausea., Disp: 10 tablet, Rfl: 0    oxyCODONE 5 MG Oral Tab, Take 1 tablet (5 mg total) by mouth every 6 (six) hours as needed., Disp: 10 tablet, Rfl: 0    metFORMIN 500 MG Oral Tab, Take 1 tablet (500 mg total) by mouth 2 (two) times daily with meals., Disp: , Rfl:     carvedilol 3.125 MG Oral Tab, Take 1 tablet (3.125 mg total) by mouth 2 (two) times daily with meals., Disp: , Rfl:     losartan 25 MG Oral Tab, Take 1 tablet (25 mg total) by mouth daily., Disp: , Rfl:     aspirin 81 MG Oral Chew Tab, Chew 1 tablet (81 mg total) by mouth daily., Disp: , Rfl:      Allergies Reviewed:  No Known Allergies     History:  Reviewed:  Past Medical History:    Diabetes (HCC)    Essential hypertension    Heart attack (HCC)      Reviewed:  Past Surgical History:   Procedure Laterality Date    Anesth,radical leg bone surgery        Reviewed Social History:  Social History     Socioeconomic History    Marital status: Single   Tobacco Use    Smoking status: Former     Types: Cigarettes    Smokeless tobacco: Never   Substance and Sexual Activity    Alcohol use: Yes    Drug use: Never     Social Determinants of Health     Food Insecurity: No Food Insecurity (7/18/2024)    Food Insecurity     Food Insecurity: Never true   Transportation Needs: No Transportation Needs (7/18/2024)    Transportation Needs      Lack of Transportation: No   Housing Stability: Low Risk  (7/18/2024)    Housing Stability     Housing Instability: No      Reviewed:  No family history on file.   Review of Systems:  Doing well post-operatively  Denies fever or chills  Denies chest pain or SOB  Denies abdominal pain or N/V  Denies dysuria or hematuria  Denies warmth, erythema, or drainage at incision       Physical Examination:  Constitutional: General Appearance: healthy-appearing, well-nourished, and well-developed. Level of Distress: NAD.   Neck: Neck: supple.   Lungs: No increased work of breathing.   Abdomen: Inspection and Palpation: no masses, tenderness (no guarding, no rebound), or CVA tenderness and soft and non-distended. Surgical incision with staples intact.   Musculoskeletal: Extremities: no edema.   Skin: Inspection and palpation: no jaundice.      Document Review:  Lab Results   Component Value Date    WBC 10.2 07/25/2024    HGB 11.6 07/25/2024    HCT 35.1 07/25/2024    .0 07/25/2024    CREATSERUM 0.71 07/25/2024    BUN 18 07/25/2024     07/25/2024    K 4.4 07/25/2024     07/25/2024    CO2 26.0 07/25/2024     07/25/2024    CA 9.3 07/25/2024    ALB 4.4 07/25/2024    ALKPHO 87 07/25/2024    BILT 0.4 07/25/2024    TP 7.2 07/25/2024    AST 91 07/25/2024    ALT 80 07/25/2024         Procedure(s):  None performed.      Assessment / Plan:  Pelvic Mass  - No acute surgical issues  - CMP and CBC obtained, patient hgb is stabilizing post surgery. LFTs reactive from surgery. No signs of dehydration, Cr and BUN within normal limits.   - Incision sites healing well  - Pathology is still pending  - Plan for follow-up for staple removal.          Electronically Signed by: DALLAS Flores

## 2024-07-30 ENCOUNTER — TELEPHONE (OUTPATIENT)
Dept: SURGERY | Facility: CLINIC | Age: 64
End: 2024-07-30

## 2024-07-30 NOTE — TELEPHONE ENCOUNTER
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 She expressed understanding. Informed patient that we are going to review her case at our multidisciplinary tumor board tomorrow morning for recommendations on next steps. All questions answered.     DALLAS Jesus

## 2024-07-31 ENCOUNTER — TELEPHONE (OUTPATIENT)
Dept: SURGERY | Facility: CLINIC | Age: 64
End: 2024-07-31

## 2024-07-31 ENCOUNTER — TELEPHONE (OUTPATIENT)
Dept: HEMATOLOGY/ONCOLOGY | Facility: HOSPITAL | Age: 64
End: 2024-07-31

## 2024-07-31 NOTE — TELEPHONE ENCOUNTER
----- Message from Angela LYLES sent at 7/31/2024  2:18 PM CDT -----    ----- Message -----  From: Matthew Buckley MD  Sent: 7/31/2024   2:06 PM CDT  To: Angela Stout, RN; DALLAS Jesus; #    New consult visit with Dr. Zamora, Dr. Robbins or me. Thanks  ----- Message -----  From: Rosalva Mancilla PA  Sent: 7/31/2024   2:04 PM CDT  To: Matthew Buckley MD; Angela Stout, RN    Hello! This patient was discussed at  this morning. Endometrial carcinosarcoma, [id number]. Recommended consultation with Dr Buckley for consideration of chemotherapy. Discussed with patient and daughter over the phone and they are agreeable. Also briefly mentioned radiation at some point as well.     Thank you!  Ariana Mancilla

## 2024-07-31 NOTE — TELEPHONE ENCOUNTER
Called patient to review tumor board recommendations to include consultation with medical oncology for adjuvant chemotherapy, as well as consideration of radiation at some point. She was agreeable. Her daughter was present for phone conversation. All questions answered. Will send message to Dr Buckley's team to schedule. Patient will call back for availability for staple removal.     DALLAS Jesus

## 2024-08-01 ENCOUNTER — TELEPHONE (OUTPATIENT)
Dept: HEMATOLOGY/ONCOLOGY | Facility: HOSPITAL | Age: 64
End: 2024-08-01

## 2024-08-02 ENCOUNTER — TELEPHONE (OUTPATIENT)
Dept: SURGERY | Facility: CLINIC | Age: 64
End: 2024-08-02

## 2024-08-02 NOTE — TELEPHONE ENCOUNTER
Spoke to patient's daughter. Having difficulty finding medical oncologist in the area that takes her insurance. Plans on seeing PCP for referral. Pathology and operative report faxed to PCP.     DALLAS Jesus'

## 2024-08-05 ENCOUNTER — TELEPHONE (OUTPATIENT)
Dept: HEMATOLOGY/ONCOLOGY | Facility: HOSPITAL | Age: 64
End: 2024-08-05

## 2024-08-05 ENCOUNTER — OFFICE VISIT (OUTPATIENT)
Dept: SURGERY | Facility: CLINIC | Age: 64
End: 2024-08-05
Payer: MEDICARE

## 2024-08-05 VITALS
BODY MASS INDEX: 28 KG/M2 | RESPIRATION RATE: 20 BRPM | HEART RATE: 82 BPM | SYSTOLIC BLOOD PRESSURE: 132 MMHG | TEMPERATURE: 97 F | DIASTOLIC BLOOD PRESSURE: 70 MMHG | WEIGHT: 151.81 LBS

## 2024-08-05 DIAGNOSIS — C54.1 CARCINOSARCOMA OF ENDOMETRIUM (HCC): Primary | ICD-10-CM

## 2024-08-05 LAB
ALBUMIN SERPL-MCNC: 4.6 G/DL (ref 3.2–4.8)
ALBUMIN/GLOB SERPL: 1.8 {RATIO} (ref 1–2)
ALP LIVER SERPL-CCNC: 99 U/L
ALT SERPL-CCNC: 10 U/L
ANION GAP SERPL CALC-SCNC: 6 MMOL/L (ref 0–18)
AST SERPL-CCNC: 12 U/L (ref ?–34)
BILIRUB SERPL-MCNC: 0.4 MG/DL (ref 0.2–1.1)
BUN BLD-MCNC: 11 MG/DL (ref 9–23)
CALCIUM BLD-MCNC: 9.3 MG/DL (ref 8.7–10.4)
CHLORIDE SERPL-SCNC: 108 MMOL/L (ref 98–112)
CO2 SERPL-SCNC: 25 MMOL/L (ref 21–32)
CREAT BLD-MCNC: 0.63 MG/DL
EGFRCR SERPLBLD CKD-EPI 2021: 98 ML/MIN/1.73M2 (ref 60–?)
ERYTHROCYTE [DISTWIDTH] IN BLOOD BY AUTOMATED COUNT: 12.4 %
GLOBULIN PLAS-MCNC: 2.5 G/DL (ref 2–3.5)
GLUCOSE BLD-MCNC: 133 MG/DL (ref 70–99)
HCT VFR BLD AUTO: 33.7 %
HGB BLD-MCNC: 11.3 G/DL
MCH RBC QN AUTO: 29.7 PG (ref 26–34)
MCHC RBC AUTO-ENTMCNC: 33.5 G/DL (ref 31–37)
MCV RBC AUTO: 88.5 FL
OSMOLALITY SERPL CALC.SUM OF ELEC: 289 MOSM/KG (ref 275–295)
PLATELET # BLD AUTO: 397 10(3)UL (ref 150–450)
POTASSIUM SERPL-SCNC: 4.2 MMOL/L (ref 3.5–5.1)
PROT SERPL-MCNC: 7.1 G/DL (ref 5.7–8.2)
RBC # BLD AUTO: 3.81 X10(6)UL
SODIUM SERPL-SCNC: 139 MMOL/L (ref 136–145)
WBC # BLD AUTO: 7.4 X10(3) UL (ref 4–11)

## 2024-08-05 PROCEDURE — 3075F SYST BP GE 130 - 139MM HG: CPT | Performed by: PHYSICIAN ASSISTANT

## 2024-08-05 PROCEDURE — 1111F DSCHRG MED/CURRENT MED MERGE: CPT | Performed by: PHYSICIAN ASSISTANT

## 2024-08-05 PROCEDURE — 99024 POSTOP FOLLOW-UP VISIT: CPT | Performed by: PHYSICIAN ASSISTANT

## 2024-08-05 PROCEDURE — 85027 COMPLETE CBC AUTOMATED: CPT | Performed by: PHYSICIAN ASSISTANT

## 2024-08-05 PROCEDURE — 3078F DIAST BP <80 MM HG: CPT | Performed by: PHYSICIAN ASSISTANT

## 2024-08-05 PROCEDURE — 80053 COMPREHEN METABOLIC PANEL: CPT | Performed by: PHYSICIAN ASSISTANT

## 2024-08-05 NOTE — PROGRESS NOTES
[facility]        Patient Name:  Rosario Benitez   YOB: 1960   Gender:  Female   Appt Date:  8/5/2024   Provider:  DALLAS Flores     PATIENT PROVIDERS  Primary Care Provider:Petar Campbell MD   Address: [address] [place] [address] [address]  [facility] [address]   Phone #: [phone]       CHIEF COMPLAINT  Post-operative visit     PROBLEMS  Reviewed   Patient Active Problem List   Diagnosis    Abdominal pain of unknown etiology    Hyponatremia    Hyperglycemia    Vaginal bleeding    Endometrial mass    Acute cystitis without hematuria    Uterine neoplasm        History of Present Illness:  Patient is a 63 year old woman who was originally consulted for surgical oncology recommendations inpatient regarding a newly diagnosed endometrial/pelvic mass.      Patient reported to the ED after having new onset right pelvic pain with pressure and  vaginal spotting started on Monday. She started passing large clots that saturated pads, diapers and a towel which prompted her to report to the ED. CT A/P was performed in the ED which showed a low attenuation collection vs mass in the endometrial canal measuring 8.6 x 7.2 x 11.2 cm extending into the lower vaginal canal. Hgb WNL 14.2. CMP with mild hyponatremia.  was 25. Patient reports her last gynecologic exam was a long time ago. She confirms colonoscopy within the last 5 years and mammogram last year. She expresses that the vaginal bleeding has improved since being admitted. She denies personal history of cancer. She does have a daughter who was recently diagnosed with rectal cancer.      Patient experienced a drop in hemoglobin and continuous vaginal bleeding and was taken in for surgery.      07/20/2024: S/p Radical abbdominal hysterectomy with bilateral salpingo-oophorectomies and partial vaginectomy. Pelvic lymphadenectomy. Bilateral ureterolysis.  ---> Carcinosarcoma of the endometrium    Interval History:   Rosario states she  has been doing great post-operatively. She no longer needs to take any pain medications. She denies vaginal bleeding or foul odor or discharge. She has been doing cooking, house work, denies any heavy lifting or soaking in a bath tub. No fevers or chills or pain around incision site. She has regularly been taking her lovenox.      Vital Signs:  There were no vitals taken for this visit.     Medications Reviewed:    Current Outpatient Medications:     enoxaparin 40 MG/0.4ML Injection Solution Prefilled Syringe, Inject 0.4 mL (40 mg total) into the skin daily., Disp: 25 each, Rfl: 0    metFORMIN 500 MG Oral Tab, Take 1 tablet (500 mg total) by mouth 2 (two) times daily with meals., Disp: , Rfl:     carvedilol 3.125 MG Oral Tab, Take 1 tablet (3.125 mg total) by mouth 2 (two) times daily with meals., Disp: , Rfl:     losartan 25 MG Oral Tab, Take 1 tablet (25 mg total) by mouth daily., Disp: , Rfl:     aspirin 81 MG Oral Chew Tab, Chew 1 tablet (81 mg total) by mouth daily., Disp: , Rfl:      Allergies Reviewed:  No Known Allergies     History:  Reviewed:  Past Medical History:    Diabetes (HCC)    Essential hypertension    Heart attack (HCC)      Reviewed:  Past Surgical History:   Procedure Laterality Date    Anesth,radical leg bone surgery        Reviewed Social History:  Social History     Socioeconomic History    Marital status: Single   Tobacco Use    Smoking status: Former     Types: Cigarettes    Smokeless tobacco: Never   Substance and Sexual Activity    Alcohol use: Yes    Drug use: Never     Social Determinants of Health     Food Insecurity: No Food Insecurity (7/18/2024)    Food Insecurity     Food Insecurity: Never true   Transportation Needs: No Transportation Needs (7/18/2024)    Transportation Needs     Lack of Transportation: No   Housing Stability: Low Risk  (7/18/2024)    Housing Stability     Housing Instability: No      Reviewed:  No family history on file.   Review of Systems:  Doing well  post-operatively  Denies fever or chills  Denies chest pain or SOB  Denies abdominal pain or N/V  Denies dysuria or hematuria  Denies warmth, erythema, or drainage at incision        Physical Examination:  Constitutional: General Appearance: healthy-appearing, well-nourished, and well-developed. Level of Distress: NAD.   Neck: Neck: supple.   Lungs: No increased work of breathing.   Abdomen: Inspection and Palpation: no masses, tenderness (no guarding, no rebound), or CVA tenderness and soft and non-distended. Mild erythema noted surrounding staples. No induration. No tunneling or abscess formation noted when probed.   Musculoskeletal: Extremities: no edema.   Skin: Inspection and palpation: no jaundice.      Document Review:   None     Procedure(s):  Staples removed     Assessment / Plan:  1. Carcinosarcoma of endometrium (HCC)  - No acute post surgical issues  - CBC with no elevation in wbc. Hgb is stable. LFTs have normalized in comparison to last office visit.   - Patient requires medical oncology, to schedule an appointment with Dr. Buckley.   - Luis removed in office, erythema noted surrounding the staples. No induration or tenderness noted. No fever or increase in WBC.  Patient instructed to hold off on oral antibiotic therapy. Okay to use antibiotic ointment and cover with gauze/tape BID. Patient aware of return precautions.       Follow Up:  Thursday- check incision       Electronically Signed by: DALLAS Flores

## 2024-08-08 ENCOUNTER — OFFICE VISIT (OUTPATIENT)
Dept: SURGERY | Facility: CLINIC | Age: 64
End: 2024-08-08
Payer: MEDICARE

## 2024-08-08 VITALS
BODY MASS INDEX: 28 KG/M2 | TEMPERATURE: 98 F | WEIGHT: 151.19 LBS | HEART RATE: 76 BPM | DIASTOLIC BLOOD PRESSURE: 64 MMHG | SYSTOLIC BLOOD PRESSURE: 126 MMHG | RESPIRATION RATE: 20 BRPM

## 2024-08-08 DIAGNOSIS — N94.89 ENDOMETRIAL MASS: ICD-10-CM

## 2024-08-08 DIAGNOSIS — C54.1 CARCINOSARCOMA OF ENDOMETRIUM (HCC): Primary | ICD-10-CM

## 2024-08-08 PROCEDURE — 1111F DSCHRG MED/CURRENT MED MERGE: CPT | Performed by: PHYSICIAN ASSISTANT

## 2024-08-08 PROCEDURE — 3074F SYST BP LT 130 MM HG: CPT | Performed by: PHYSICIAN ASSISTANT

## 2024-08-08 PROCEDURE — 99024 POSTOP FOLLOW-UP VISIT: CPT | Performed by: PHYSICIAN ASSISTANT

## 2024-08-08 PROCEDURE — 3078F DIAST BP <80 MM HG: CPT | Performed by: PHYSICIAN ASSISTANT

## 2024-08-08 NOTE — PROGRESS NOTES
[facility]        Patient Name:  Rosario Benitez   YOB: 1960   Gender:  Female   Appt Date:  8/8/2024   Provider:  DALLAS Flores     PATIENT PROVIDERS  Primary Care Provider:Petar Campbell MD   Address: [address] [place] [address] [address]  [facility] [address]   Phone #: [phone]       CHIEF COMPLAINT  Chief Complaint   Patient presents with    Post-Op        PROBLEMS  Reviewed   Patient Active Problem List   Diagnosis    Abdominal pain of unknown etiology    Hyponatremia    Hyperglycemia    Vaginal bleeding    Endometrial mass    Acute cystitis without hematuria    Uterine neoplasm        History of Present Illness:  Patient is a 63 year old woman who was originally consulted for surgical oncology recommendations inpatient regarding a newly diagnosed endometrial/pelvic mass.      Patient reported to the ED after having new onset right pelvic pain with pressure and  vaginal spotting started on Monday. She started passing large clots that saturated pads, diapers and a towel which prompted her to report to the ED. CT A/P was performed in the ED which showed a low attenuation collection vs mass in the endometrial canal measuring 8.6 x 7.2 x 11.2 cm extending into the lower vaginal canal. Hgb WNL 14.2. CMP with mild hyponatremia.  was 25. Patient reports her last gynecologic exam was a long time ago. She confirms colonoscopy within the last 5 years and mammogram last year. She expresses that the vaginal bleeding has improved since being admitted. She denies personal history of cancer. She does have a daughter who was recently diagnosed with rectal cancer.      Patient experienced a drop in hemoglobin and continuous vaginal bleeding and was taken in for surgery.      07/20/2024: S/p Radical abbdominal hysterectomy with bilateral salpingo-oophorectomies and partial vaginectomy. Pelvic lymphadenectomy. Bilateral ureterolysis.  ---> Carcinosarcoma of the endometrium      Interval history:   Rosario states she has been doing great post-operatively. She no longer needs to take any pain medications. She denies vaginal bleeding or foul odor or discharge. No fevers or chills or pain around incision site. She denies any pus or drainage from incision site.      Vital Signs:  /64 (BP Location: Left arm, Patient Position: Sitting, Cuff Size: adult)   Pulse 76   Temp 97.5 °F (36.4 °C) (Temporal)   Resp 20   Wt 68.6 kg (151 lb 3.2 oz)   BMI 27.65 kg/m²      Medications Reviewed:    Current Outpatient Medications:     enoxaparin 40 MG/0.4ML Injection Solution Prefilled Syringe, Inject 0.4 mL (40 mg total) into the skin daily., Disp: 25 each, Rfl: 0    metFORMIN 500 MG Oral Tab, Take 1 tablet (500 mg total) by mouth 2 (two) times daily with meals., Disp: , Rfl:     carvedilol 3.125 MG Oral Tab, Take 1 tablet (3.125 mg total) by mouth 2 (two) times daily with meals., Disp: , Rfl:     losartan 25 MG Oral Tab, Take 1 tablet (25 mg total) by mouth daily., Disp: , Rfl:     aspirin 81 MG Oral Chew Tab, Chew 1 tablet (81 mg total) by mouth daily., Disp: , Rfl:      Allergies Reviewed:  No Known Allergies     History:  Reviewed:  Past Medical History:    Diabetes (HCC)    Essential hypertension    Heart attack (HCC)      Reviewed:  Past Surgical History:   Procedure Laterality Date    Anesth,radical leg bone surgery        Reviewed Social History:  Social History     Socioeconomic History    Marital status: Single   Tobacco Use    Smoking status: Former     Types: Cigarettes    Smokeless tobacco: Never   Substance and Sexual Activity    Alcohol use: Yes    Drug use: Never     Social Determinants of Health     Food Insecurity: No Food Insecurity (7/18/2024)    Food Insecurity     Food Insecurity: Never true   Transportation Needs: No Transportation Needs (7/18/2024)    Transportation Needs     Lack of Transportation: No   Housing Stability: Low Risk  (7/18/2024)    Housing Stability      Housing Instability: No      Reviewed:  No family history on file.   Review of Systems:  Doing well post-operatively  Denies fever or chills  Denies chest pain or SOB  Denies abdominal pain or N/V  Denies dysuria or hematuria  Denies warmth, erythema, or drainage at incision        Physical Examination:  Constitutional: General Appearance: healthy-appearing, well-nourished, and well-developed. Level of Distress: NAD.   Eyes: Sclera: non-icteric.   Neck: Neck: supple.   Lymph Nodes: Lymph Nodes no cervical LAD, supraclavicular LAD, axillary LAD, or inguinal LAD.   Lungs: Auscultation: breath sounds normal.   Cardiovascular: Heart Auscultation: RRR.   Abdomen: Inspection and Palpation: no masses, tenderness (no guarding, no rebound), or CVA tenderness and soft and non-distended. Liver: no hepatomegaly. Spleen: no splenomegaly. Hernia: none palpable.   Musculoskeletal: Extremities: no edema.   Skin: Inspection and palpation: no jaundice. Mild erythema noted around previous staple sites.      Document Review:  None     Procedure(s):  None     Assessment / Plan:  Carcinosarcoma of endometrium  - No acute post surgical issues  - Patient has appointment with Dr. Buckley on 08/14/2024  - Mild erythema noted surrounding the staples. No induration or tenderness noted. No fever or chills. Patient instructed to hold off on oral antibiotic therapy. Okay to use antibiotic ointment and cover with gauze/tape BID.        Follow Up:  Next Wednesday- incision check       Electronically Signed by: DALLAS Flores

## 2024-08-09 ENCOUNTER — HOSPITAL ENCOUNTER (EMERGENCY)
Age: 64
Discharge: HOME | End: 2024-08-09
Attending: EMERGENCY MEDICINE
Payer: MEDICARE

## 2024-08-09 VITALS
TEMPERATURE: 98 F | HEART RATE: 78 BPM | SYSTOLIC BLOOD PRESSURE: 119 MMHG | OXYGEN SATURATION: 97 % | WEIGHT: 132 LBS | RESPIRATION RATE: 17 BRPM | BODY MASS INDEX: 24.29 KG/M2 | DIASTOLIC BLOOD PRESSURE: 66 MMHG | HEIGHT: 62 IN

## 2024-08-09 DIAGNOSIS — L50.9 URTICARIA: Primary | ICD-10-CM

## 2024-08-09 PROCEDURE — 99283 EMERGENCY DEPT VISIT LOW MDM: CPT

## 2024-08-09 RX ORDER — DIPHENHYDRAMINE HCL 25 MG
25 CAPSULE ORAL ONCE
Status: COMPLETED | OUTPATIENT
Start: 2024-08-09 | End: 2024-08-09

## 2024-08-09 RX ORDER — PREDNISONE 20 MG/1
40 TABLET ORAL DAILY
Qty: 6 TABLET | Refills: 0 | Status: SHIPPED | OUTPATIENT
Start: 2024-08-09 | End: 2024-08-12

## 2024-08-09 RX ORDER — PREDNISONE 20 MG/1
40 TABLET ORAL ONCE
Status: COMPLETED | OUTPATIENT
Start: 2024-08-09 | End: 2024-08-09

## 2024-08-09 NOTE — DISCHARGE INSTRUCTIONS
Follow-up with your primary care doctor in the next week for reassessment.  Return for any difficulty breathing, vomiting any other concerning symptoms.

## 2024-08-09 NOTE — ED PROVIDER NOTES
Patient Seen in: [facility] [place]      History     Chief Complaint   Patient presents with    Rash Skin Problem     Stated Complaint: post op hysterectomy. No c/o of all over rash    Subjective:   HPI    63-year-old female presents today for evaluation.  Patient had surgery to remove an endometrial carcinoma 1 month ago.  She had been recovering well.  Several hours prior to arrival, she developed a red pruritic rash in her bilateral groin, armpits and underneath her bra line.  She denies any new medications or foods.  She has not had any vomiting, throat closing sensation, diarrhea or shortness of breath.    Objective:   Past Medical History:    Diabetes (HCC)    Essential hypertension    Heart attack (HCC)              Past Surgical History:   Procedure Laterality Date    Anesth,radical leg bone surgery                  Social History     Socioeconomic History    Marital status: Single   Tobacco Use    Smoking status: Former     Types: Cigarettes     Passive exposure: Never    Smokeless tobacco: Never   Vaping Use    Vaping status: Never Used   Substance and Sexual Activity    Alcohol use: Yes    Drug use: Never     Social Determinants of Health     Food Insecurity: No Food Insecurity (7/18/2024)    Food Insecurity     Food Insecurity: Never true   Transportation Needs: No Transportation Needs (7/18/2024)    Transportation Needs     Lack of Transportation: No   Housing Stability: Low Risk  (7/18/2024)    Housing Stability     Housing Instability: No              Review of Systems    Positive for stated Chief Complaint: Rash Skin Problem    Other systems are as noted in HPI.  Constitutional and vital signs reviewed.      All other systems reviewed and negative except as noted above.    Physical Exam     ED Triage Vitals [08/09/24 0145]   /85   Pulse 82   Resp 16   Temp 98.1 °F (36.7 °C)   Temp src Temporal   SpO2 98 %   O2 Device None (Room air)       Current Vitals:   Vital  Signs  BP: 140/85  Pulse: 82  Resp: 16  Temp: 98.1 °F (36.7 °C)  Temp src: Temporal    Oxygen Therapy  SpO2: 98 %  O2 Device: None (Room air)            Physical Exam  Vitals and nursing note reviewed.   Constitutional:       Appearance: Normal appearance.   HENT:      Head: Normocephalic.      Nose: Nose normal.      Mouth/Throat:      Mouth: Mucous membranes are moist.      Comments: No angioedema  Eyes:      Extraocular Movements: Extraocular movements intact.   Cardiovascular:      Rate and Rhythm: Normal rate.   Pulmonary:      Effort: Pulmonary effort is normal.      Breath sounds: Normal breath sounds.   Abdominal:      General: Abdomen is flat.      Tenderness: There is no abdominal tenderness. There is no guarding or rebound.      Comments: Incision clean, dry and intact without any drainage or erythema   Musculoskeletal:         General: Normal range of motion.   Skin:     General: Skin is warm.      Comments: Urticaria in the bilateral groin, armpits, arms and underneath the bra line   Neurological:      General: No focal deficit present.      Mental Status: She is alert.   Psychiatric:         Mood and Affect: Mood normal.           ED Course   Labs Reviewed - No data to display                   MDM      Differential Diagnosis  63-year-old female presents today for evaluation of an urticarial rash.  Patient has no angioedema and her lungs are grossly clear currently.  She has not had any gastrointestinal symptoms.  Clinically, she is not anaphylactic.  She denies any new foods, medications or other potential allergic exposures.  Her incision site looks well-healed and not infected currently.  Patient overall clinically appears well.  I discussed with patient that I suspect she has an urticarial rash and the source of which is unclear.  Will treat with a course of steroids and antihistamines.  I encouraged nondrowsy antihistamines for home.  She feels comfortable plan, will DC.    External Chart  Reviewed  I reviewed the discharge summary from her hospitalization in July along with the surgical follow-up note from yesterday                                   Medical Decision Making      Disposition and Plan     Clinical Impression:  1. Urticaria         Disposition:  Discharge  8/9/2024  2:15 am    Follow-up:  Petar Campbell MD  [address] [facility]  [address]  [facility] [address]  [phone]    Call in 1 day(s)            Medications Prescribed:  Current Discharge Medication List        START taking these medications    Details   predniSONE 20 MG Oral Tab Take 2 tablets (40 mg total) by mouth daily for 3 days.  Qty: 6 tablet, Refills: 0

## 2024-08-09 NOTE — ED INITIAL ASSESSMENT (HPI)
Pt to the ED for evaluation of hives that developed 3 hours ago. Pt reports it began along her bra line and has spread. Denies trouble swallowing/breathing. In no apparent distress.

## 2024-08-13 NOTE — PROGRESS NOTES
[facility]    Diagnosis: Stage II: pT2 pN0 Endometrial Carcinosarcoma    Treatment History:   s/p radical abdominal hysterectomy with BSO and partial vaginectomy, pelvic LAD with Dr. Chavarria 24    Visit Diagnosis:  No diagnosis found.    History of Present Illness: 63F with a PMH of DM2, HTN and CAD was referred by Dr. Chavarria    -She presented to the ER in 2024 with pelvic pain and vaginal spotting. CT CAP 24-endometrial mass extending to vaginal canal (8.6 x 7.2 x 11.2 cm).  25.   -24-endometrial mass excision with Dr. Chavarria necrotic carcinosarcoma  -24-radical abdominal hysterectomy with BSO and partial vaginectomy, pelvic LAD with Dr. Chavarria. 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  -Reviewed in tumor board-recommended adjuvant carbo/taxol followed by RT  -FH of malignancy as below    Review of Systems: 12 Point ROS was completed and pertinent positives are in the HPI    Current Outpatient Medications on File Prior to Visit   Medication Sig Dispense Refill    [] predniSONE 20 MG Oral Tab Take 2 tablets (40 mg total) by mouth daily for 3 days. 6 tablet 0    enoxaparin 40 MG/0.4ML Injection Solution Prefilled Syringe Inject 0.4 mL (40 mg total) into the skin daily. 25 each 0    metFORMIN 500 MG Oral Tab Take 1 tablet (500 mg total) by mouth 2 (two) times daily with meals.      carvedilol 3.125 MG Oral Tab Take 1 tablet (3.125 mg total) by mouth 2 (two) times daily with meals.      losartan 25 MG Oral Tab Take 1 tablet (25 mg total) by mouth daily.      aspirin 81 MG Oral Chew Tab Chew 1 tablet (81 mg total) by mouth daily.       Current Facility-Administered Medications on File Prior to Visit   Medication Dose Route Frequency Provider Last Rate Last Admin    [COMPLETED] predniSONE (Deltasone) tab 40 mg  40 mg Oral Once Bernardino Montaño MD   40 mg at 24 0220    [COMPLETED] diphenhydrAMINE (Benadryl)  cap/tab 25 mg  25 mg Oral Once Bernardino Montaño MD   25 mg at 24 0220    [COMPLETED] magnesium oxide (Mag-Ox) tab 400 mg  400 mg Oral Once Maximiliano Birch MD   400 mg at 24 0938    [COMPLETED] insulin aspart (NovoLOG) 100 Units/mL vial 1-5 Units  1-5 Units Subcutaneous Once Darryl Aguilera MD   2 Units at 24 1102    [COMPLETED] iopamidol 76% (ISOVUE-370) injection for power injector  75 mL Intravenous ONCE PRN Maximiliano Birch MD   75 mL at 24 1920    [COMPLETED] sodium chloride 0.9 % IV bolus 1,000 mL  1,000 mL Intravenous Once Presley Lewis MD   Stopped at 24 1651    [COMPLETED] ondansetron (Zofran) 4 MG/2ML injection 4 mg  4 mg Intravenous Once Presley Lewis MD   4 mg at 24 1214    [COMPLETED] iopamidol 76% (ISOVUE-370) injection for power injector  100 mL Intravenous ONCE PRN Presley Lewis MD   100 mL at 24 1305    [] sodium chloride 0.9% infusion   Intravenous Continuous Presley Lewis  mL/hr at 24 1906 New Bag at 24 1906     Past Medical History:    Diabetes (HCC)    Essential hypertension    Heart attack (HCC)     Past Surgical History:   Procedure Laterality Date    Anesth,radical leg bone surgery       Social History     Socioeconomic History    Marital status: Single   Tobacco Use    Smoking status: Former     Types: Cigarettes     Passive exposure: Never    Smokeless tobacco: Never    Tobacco comments:     QUIT 2007   Vaping Use    Vaping status: Never Used   Substance and Sexual Activity    Alcohol use: Yes     Comment: social    Drug use: Never      Family History   Problem Relation Age of Onset    Cancer Daughter         colorectal    Cancer Daughter         thyroid    Cancer Sister     Cancer Sister         thyroid       Physical Exam  Height: --  Weight: 68.7 kg (151 lb 6.4 oz) (1023)  BSA (Calculated - sq m): --  Pulse: 78 (1023)  BP: 141/70 (1023)  Temp: 97.7 °F (36.5 °C) (1023)  Do Not Use  - Resp Rate: --  SpO2: 98 % (08/14 1023)  General: NAD, AOX3  HEENT: clear op, mmm, no jvd, no scleral icterus  CV: RRR S1S2 no murmurs  Extremities: No edema   Lungs: CTAB, no increased work of breathing  Abd: soft nt nd +BS no hepatosplenomegaly  Neuro: CN: II-XII grossly intact      Results:  Lab Results   Component Value Date    WBC 7.4 08/05/2024    HGB 11.3 (L) 08/05/2024    HCT 33.7 (L) 08/05/2024    MCV 88.5 08/05/2024    .0 08/05/2024     Lab Results   Component Value Date     08/05/2024    K 4.2 08/05/2024    CO2 25.0 08/05/2024     08/05/2024    BUN 11 08/05/2024    PHOS 3.2 07/21/2024    ALB 4.6 08/05/2024       Final Diagnosis:   A.  Sigmoid epiploica:  -Fibroadipose tissue with fat necrosis associated with calcification.  -Negative for malignancy.     B.  Vaginal canal tumor:  -Necrotic carcinosarcoma.     C. Cervix, uterus, bilateral fallopian tubes and ovaries:  -Carcinosarcoma of the endometrium.  -Tumor invades the outer half of the myometrium (1.8 cm of a 2.1 cm thick myometrium).  -Tumor extends into the cervical stroma.  -Tumor measures 6 cm in greatest dimension.   -Background endometrial polyp.  -No definite lymphatic/vascular invasion.  -Bilateral parametria, negative for tumor.  -Bilateral ovaries with corpus albicantia.  -Bilateral unremarkable fallopian tubes.     D.  Bilateral pelvic lymph nodes:  -5 lymph nodes (0/5), negative for malignancy       Radiology: reviewed     Assessment and Plan:  Stage II: pT2 pN0 Endometrial Carcinosarcoma  -s/p radical abdominal hysterectomy with BSO and partial vaginectomy, pelvic LAD with Dr. Chavarria 7/19/24. We discussed that she has a higher risk of recurrence and adjuvant Carbo/taxol x 6 cycles followed by adjuvant RT is recommended. She has mild neuropathy at baseline that will need to be monitored.   -Repeat CT CAP after chemotherapy   -chemo ed    FH of Cancer; referred to genetics     [facility]

## 2024-08-14 ENCOUNTER — OFFICE VISIT (OUTPATIENT)
Dept: SURGERY | Facility: CLINIC | Age: 64
End: 2024-08-14
Payer: MEDICARE

## 2024-08-14 ENCOUNTER — OFFICE VISIT (OUTPATIENT)
Dept: HEMATOLOGY/ONCOLOGY | Facility: HOSPITAL | Age: 64
End: 2024-08-14
Attending: INTERNAL MEDICINE
Payer: MEDICARE

## 2024-08-14 VITALS
WEIGHT: 151.38 LBS | OXYGEN SATURATION: 98 % | TEMPERATURE: 98 F | BODY MASS INDEX: 28 KG/M2 | HEART RATE: 78 BPM | RESPIRATION RATE: 18 BRPM | SYSTOLIC BLOOD PRESSURE: 141 MMHG | DIASTOLIC BLOOD PRESSURE: 70 MMHG

## 2024-08-14 VITALS
HEART RATE: 81 BPM | TEMPERATURE: 98 F | DIASTOLIC BLOOD PRESSURE: 81 MMHG | RESPIRATION RATE: 20 BRPM | WEIGHT: 150.19 LBS | BODY MASS INDEX: 27 KG/M2 | SYSTOLIC BLOOD PRESSURE: 140 MMHG

## 2024-08-14 DIAGNOSIS — N94.89 ENDOMETRIAL MASS: Primary | ICD-10-CM

## 2024-08-14 DIAGNOSIS — C54.1 CARCINOSARCOMA OF ENDOMETRIUM (HCC): Primary | ICD-10-CM

## 2024-08-14 DIAGNOSIS — Z80.9 FAMILY HISTORY OF CANCER: ICD-10-CM

## 2024-08-14 PROCEDURE — 3078F DIAST BP <80 MM HG: CPT | Performed by: INTERNAL MEDICINE

## 2024-08-14 PROCEDURE — 99024 POSTOP FOLLOW-UP VISIT: CPT | Performed by: PHYSICIAN ASSISTANT

## 2024-08-14 PROCEDURE — 3077F SYST BP >= 140 MM HG: CPT | Performed by: INTERNAL MEDICINE

## 2024-08-14 PROCEDURE — 99205 OFFICE O/P NEW HI 60 MIN: CPT | Performed by: INTERNAL MEDICINE

## 2024-08-14 PROCEDURE — 3077F SYST BP >= 140 MM HG: CPT | Performed by: PHYSICIAN ASSISTANT

## 2024-08-14 PROCEDURE — 3079F DIAST BP 80-89 MM HG: CPT | Performed by: PHYSICIAN ASSISTANT

## 2024-08-14 PROCEDURE — 1111F DSCHRG MED/CURRENT MED MERGE: CPT | Performed by: INTERNAL MEDICINE

## 2024-08-14 PROCEDURE — 1111F DSCHRG MED/CURRENT MED MERGE: CPT | Performed by: PHYSICIAN ASSISTANT

## 2024-08-14 NOTE — PROGRESS NOTES
[facility]        Patient Name:  Rosario Benitez   YOB: 1960   Gender:  Female   Appt Date:  8/14/2024   Provider:  DALLAS Flores     PATIENT PROVIDERS  Primary Care Provider:Umberto Enciso MD   Address: [address] [place] [address]   Phone #: [phone]       CHIEF COMPLAINT  Chief Complaint   Patient presents with    Post-Op        PROBLEMS  Reviewed   Patient Active Problem List   Diagnosis    Abdominal pain of unknown etiology    Hyponatremia    Hyperglycemia    Vaginal bleeding    Endometrial mass    Acute cystitis without hematuria    Uterine neoplasm        History of Present Illness:  Patient is a 63 year old woman who was originally consulted for surgical oncology recommendations inpatient regarding a newly diagnosed endometrial/pelvic mass.      Patient reported to the ED after having new onset right pelvic pain with pressure and  vaginal spotting started on Monday. She started passing large clots that saturated pads, diapers and a towel which prompted her to report to the ED. CT A/P was performed in the ED which showed a low attenuation collection vs mass in the endometrial canal measuring 8.6 x 7.2 x 11.2 cm extending into the lower vaginal canal. Hgb WNL 14.2. CMP with mild hyponatremia.  was 25. Patient reports her last gynecologic exam was a long time ago. She confirms colonoscopy within the last 5 years and mammogram last year. She expresses that the vaginal bleeding has improved since being admitted. She denies personal history of cancer. She does have a daughter who was recently diagnosed with rectal cancer.      Patient experienced a drop in hemoglobin and continuous vaginal bleeding and was taken in for surgery.      07/20/2024: S/p Radical abbdominal hysterectomy with bilateral salpingo-oophorectomies and partial vaginectomy. Pelvic lymphadenectomy. Bilateral ureterolysis.  ---> Carcinosarcoma of the endometrium      Interval History:   08/09/2024: Patient had visit to the ER for a urticarial rash on 08/09/2024 for which source was unclear. She was given prednisone 20 mg for BID- three days. She states her rash resolved and she is feeling better. No rash noted on exam and patient did not have an anaphylactic response. Incision looks well healed. She is requiring no pain medications from surgery.      Vital Signs:  /81 (BP Location: Left arm, Patient Position: Sitting, Cuff Size: adult)   Pulse 81   Temp 97.5 °F (36.4 °C) (Temporal)   Resp 20   Wt 68.1 kg (150 lb 3.2 oz)   BMI 27.47 kg/m²      Medications Reviewed:    Current Outpatient Medications:     metFORMIN 500 MG Oral Tab, Take 1 tablet (500 mg total) by mouth 2 (two) times daily with meals., Disp: , Rfl:     carvedilol 3.125 MG Oral Tab, Take 1 tablet (3.125 mg total) by mouth 2 (two) times daily with meals., Disp: , Rfl:     losartan 25 MG Oral Tab, Take 1 tablet (25 mg total) by mouth daily., Disp: , Rfl:     aspirin 81 MG Oral Chew Tab, Chew 1 tablet (81 mg total) by mouth daily., Disp: , Rfl:      Allergies Reviewed:  No Known Allergies     History:  Reviewed:  Past Medical History:    Diabetes (HCC)    Essential hypertension    Heart attack (HCC)      Reviewed:  Past Surgical History:   Procedure Laterality Date    Anesth,radical leg bone surgery        Reviewed Social History:  Social History     Socioeconomic History    Marital status: Single   Tobacco Use    Smoking status: Former     Types: Cigarettes     Passive exposure: Never    Smokeless tobacco: Never    Tobacco comments:     QUIT 2007   Vaping Use    Vaping status: Never Used   Substance and Sexual Activity    Alcohol use: Yes     Comment: social    Drug use: Never     Social Determinants of Health     Food Insecurity: No Food Insecurity (7/18/2024)    Food Insecurity     Food Insecurity: Never true   Transportation Needs: No Transportation Needs (7/18/2024)    Transportation Needs     Lack of  Transportation: No   Housing Stability: Low Risk  (7/18/2024)    Housing Stability     Housing Instability: No      Reviewed:  Family History   Problem Relation Age of Onset    Cancer Daughter         colorectal    Cancer Daughter         thyroid    Cancer Sister     Cancer Sister         thyroid      Review of Systems:  Doing well post-operatively  Denies fever or chills  Denies chest pain or SOB  Denies abdominal pain or N/V  Denies dysuria or hematuria  Denies warmth, erythema, or drainage at incision        Physical Examination:  Constitutional: General Appearance: healthy-appearing, well-nourished, and well-developed. Level of Distress: NAD.   Eyes: Sclera: non-icteric.   Neck: Neck: supple.   Lymph Nodes: Lymph Nodes no cervical LAD, supraclavicular LAD, axillary LAD, or inguinal LAD.   Lungs: No increased work of breathing.   Cardiovascular: Heart Auscultation: RRR.   Abdomen: Inspection and Palpation: no masses, tenderness (no guarding, no rebound), or CVA tenderness and soft and non-distended. Liver: no hepatomegaly. Spleen: no splenomegaly. Hernia: none palpable.   Musculoskeletal: Extremities: no edema.   Skin: Inspection and palpation: no jaundice. No drainage from midline incision site.     Document Review:  None     Procedure(s):  None     Assessment / Plan:  1. Carcinosarcoma of endometrium (HCC)  - No acute post surgical issues  - Patient is post operative day 25. Her incision site looks well healed. No drainage or erythema noted.   - Patient has appointment with Dr. Buckley from medical oncology today  - She is aware of return precautions to our office.       Follow Up:  Return precautions stated to patient       Electronically Signed by: DALLAS Flores

## 2024-08-14 NOTE — PROGRESS NOTES
Patient here as a new consult. Had surgery 7/20/24. States she has occasional weakness. Denies any pain, fevers, nausea, abdominal distension, or any other symptoms at this time.

## 2024-08-16 ENCOUNTER — OFFICE VISIT (OUTPATIENT)
Dept: HEMATOLOGY/ONCOLOGY | Age: 64
End: 2024-08-16
Attending: INTERNAL MEDICINE
Payer: MEDICARE

## 2024-08-16 ENCOUNTER — TELEPHONE (OUTPATIENT)
Dept: SURGERY | Facility: CLINIC | Age: 64
End: 2024-08-16

## 2024-08-16 ENCOUNTER — APPOINTMENT (OUTPATIENT)
Dept: HEMATOLOGY/ONCOLOGY | Facility: HOSPITAL | Age: 64
End: 2024-08-16
Attending: INTERNAL MEDICINE
Payer: MEDICARE

## 2024-08-16 DIAGNOSIS — Z71.9 HEALTH EDUCATION/COUNSELING: Primary | ICD-10-CM

## 2024-08-16 DIAGNOSIS — C54.1 ENDOMETRIAL CANCER (HCC): ICD-10-CM

## 2024-08-16 DIAGNOSIS — N94.89 ENDOMETRIAL MASS: ICD-10-CM

## 2024-08-16 RX ORDER — ONDANSETRON 8 MG/1
8 TABLET, ORALLY DISINTEGRATING ORAL EVERY 8 HOURS PRN
Qty: 30 TABLET | Refills: 0 | Status: SHIPPED | OUTPATIENT
Start: 2024-08-16

## 2024-08-16 RX ORDER — PROCHLORPERAZINE MALEATE 10 MG
10 TABLET ORAL EVERY 6 HOURS PRN
Qty: 30 TABLET | Refills: 3 | Status: SHIPPED | OUTPATIENT
Start: 2024-08-16

## 2024-08-16 RX ORDER — PROCHLORPERAZINE MALEATE 10 MG
10 TABLET ORAL EVERY 6 HOURS PRN
Qty: 30 TABLET | Refills: 3 | Status: SHIPPED | OUTPATIENT
Start: 2024-08-16 | End: 2024-08-16

## 2024-08-16 RX ORDER — ONDANSETRON 8 MG/1
8 TABLET, ORALLY DISINTEGRATING ORAL EVERY 8 HOURS PRN
Qty: 30 TABLET | Refills: 0 | Status: SHIPPED | OUTPATIENT
Start: 2024-08-16 | End: 2024-08-16

## 2024-08-16 NOTE — TELEPHONE ENCOUNTER
Patient called and stated that she is experiencing RLQ pain. States it is a discomfort, not pain and rates it a 5/10. She states she has not lifted anything heavy. She denies any vaginal bleeding or odor. Denies any color changes to the skin. No fevers or chills. No drainage or odor from previous incision site. Informed patient this is most likely post operative discomfort, advised her to take a tylenol. She does not have history of an appendectomy. Informed her if she develops a fever, N/V or pain worsens significantly in severity to call the office/go to [facility]

## 2024-08-16 NOTE — PROGRESS NOTES
IV Chemotherapy Education    Drug names:  Paclitaxel and carboplatin    Learner:  Patient and Family Member    Chemotherapy education goals:  Learn the drug names  Administration schedule  Routes of administration  Treatment setting    Today we reviewed endometrial cancer, chemotherapy, and treatment plan. Discussed side effects of each drug in detail, importance of close follow up, lab monitoring and symptom management. Infection and bleeding precautions reviewed while on chemo. Home medications and schedule reviewed.    Chemotherapy action on cancer / normal cells    Treatment Effects on Bone Marrow:    Chemotherapy action on cancer / normal cells}  Function of white blood cells / signs of infection  Function of red blood cells / signs of anemia:    Function of platelets / signs of bleeding:    Notify MD/RN of any chills or fever 100.5 and above:     Treatment Effects on Nutritional Status/Mucous Membranes:    Appropriate oral hygiene / signs of stomatitis/oral lesions  Oral rinses procedure    Changes in taste perception / appetite  Nausea and vomiting / use of anti-nausea medications.  Diarrhea / constipation / dietary changes    Treatment Effects on Hair:    Possibility of hair loss     Treatment Effects on Neurological System:    Potential numbness / tingling in hands or feet/Notify MD/RN of any numbness / tingling at next visit    Treatment Effects on the Bladder and Kidneys:    Function of the kidneys and bladder  Signs / symptoms of hemorrhagic cystitis  Suggested fluid intake     Notify MD/RN if blood appears in urine or if you have a decreased urine output     Treatment Effects on Reproductive System:    Avoid pregnancy / use of barrier birth control methods:    Possible sterility, impotence, changes in sex drive:      Treatment Effects on Emotional Status:    Potential mood changes, depression, nervousness, difficulty sleeping  Importance of support system  Notify MD/RN of any emotional  changes    Vesicants / Irritants:    Potential extravasation at site of administration   Signs / symptoms include redness, swelling, pain, burning, or blistering at the site of administration   To Notify MD/RN IMMEDIATELY if any of the above signs / symptoms occur         Teaching Materials Provided:      [employer] Chemotherapy information sheets  When to contact the Treatment Team Information Sheet  Side Effect Management Information Sheet  [facility] Support Services Sheet  Dietician information sheet    Patient was given ample opportunity to ask questions.  All questions and concerns addressed.    Chemotherapy Consent Form signed by the patient.      Encounter Times  PreCharting:   minutes    Reviewing/Obtaining: 3 minutes      Medical Exam:   minutes    Plan:   minutes      Notes: 3 minutes    Counseling/Education: 55 minutes      Referring/Communicating:   minutes    Ind Interpretation:   minutes      Care Coordination:   minutes       My total time spent caring for the patient on the day of the encounter: 61 minutes.     Carol Villa APRN - BC  Nurse Practitioner  [facility]

## 2024-08-20 ENCOUNTER — TELEPHONE (OUTPATIENT)
Dept: SURGERY | Facility: CLINIC | Age: 64
End: 2024-08-20

## 2024-08-20 NOTE — TELEPHONE ENCOUNTER
Patient has been experiencing diffuse abdominal pain. I called to follow-up, she states that the pain has gradually gotten better. Denies any redness or drainage from incision site. She is starting chemotherapy this Thursday. Denies any fever, chills or nausea. Most likely post operatively healing pain.     DALLAS Flores

## 2024-08-21 ENCOUNTER — SOCIAL WORK SERVICES (OUTPATIENT)
Dept: HEMATOLOGY/ONCOLOGY | Facility: HOSPITAL | Age: 64
End: 2024-08-21

## 2024-08-21 NOTE — PROGRESS NOTES
SW sent a [employer] message to provide resources and SW contact information. SW provided cancer support resources including [facility] and [facility]. The packet of resources included information on transportation resources, homecare/home health agencies, Facebook support group page and counseling resources. SW reviewed Advance Directives and importance of completion. SW explained role of SW in Cancer Center and social work contact information.    Carli Villanueva LCSW   at [address], [place] [address]  Ph: [phone] [email or web address]  Fax: [phone]

## 2024-08-22 ENCOUNTER — OFFICE VISIT (OUTPATIENT)
Dept: HEMATOLOGY/ONCOLOGY | Facility: HOSPITAL | Age: 64
End: 2024-08-22
Attending: INTERNAL MEDICINE
Payer: MEDICARE

## 2024-08-22 VITALS
TEMPERATURE: 97 F | HEIGHT: 62.68 IN | DIASTOLIC BLOOD PRESSURE: 77 MMHG | BODY MASS INDEX: 26.95 KG/M2 | WEIGHT: 150.19 LBS | SYSTOLIC BLOOD PRESSURE: 162 MMHG | OXYGEN SATURATION: 99 % | RESPIRATION RATE: 14 BRPM | HEART RATE: 62 BPM

## 2024-08-22 DIAGNOSIS — D49.59 UTERINE NEOPLASM: Primary | ICD-10-CM

## 2024-08-22 DIAGNOSIS — C54.1 ENDOMETRIAL CANCER (HCC): Primary | ICD-10-CM

## 2024-08-22 DIAGNOSIS — R10.9 ABDOMINAL DISCOMFORT: ICD-10-CM

## 2024-08-22 DIAGNOSIS — Z51.11 ENCOUNTER FOR CHEMOTHERAPY MANAGEMENT: ICD-10-CM

## 2024-08-22 PROBLEM — I73.9 PERIPHERAL VASCULAR DISEASE (HCC): Status: ACTIVE | Noted: 2022-09-29

## 2024-08-22 PROBLEM — I73.9 PERIPHERAL VASCULAR DISEASE: Status: ACTIVE | Noted: 2022-09-29

## 2024-08-22 PROBLEM — I10 ESSENTIAL HYPERTENSION: Status: ACTIVE | Noted: 2024-05-02

## 2024-08-22 PROBLEM — I42.0 DILATED CARDIOMYOPATHY (HCC): Status: ACTIVE | Noted: 2024-05-02

## 2024-08-22 PROBLEM — E78.00 HYPERCHOLESTEROLEMIA: Status: ACTIVE | Noted: 2024-05-02

## 2024-08-22 PROBLEM — G62.9 SENSORY NEUROPATHY: Status: ACTIVE | Noted: 2022-09-29

## 2024-08-22 PROBLEM — E11.40 TYPE 2 DIABETES MELLITUS WITH DIABETIC NEUROPATHY (HCC): Status: ACTIVE | Noted: 2022-11-10

## 2024-08-22 PROBLEM — I25.10 CORONARY ARTERY DISEASE INVOLVING NATIVE CORONARY ARTERY OF NATIVE HEART WITHOUT ANGINA PECTORIS: Status: ACTIVE | Noted: 2024-05-02

## 2024-08-22 LAB
ALBUMIN SERPL-MCNC: 4.5 G/DL (ref 3.2–4.8)
ALBUMIN/GLOB SERPL: 1.7 {RATIO} (ref 1–2)
ALP LIVER SERPL-CCNC: 94 U/L
ALT SERPL-CCNC: 10 U/L
ANION GAP SERPL CALC-SCNC: 7 MMOL/L (ref 0–18)
AST SERPL-CCNC: 15 U/L (ref ?–34)
BASOPHILS # BLD AUTO: 0.03 X10(3) UL (ref 0–0.2)
BASOPHILS NFR BLD AUTO: 0.4 %
BILIRUB SERPL-MCNC: 0.5 MG/DL (ref 0.2–1.1)
BILIRUB UR QL STRIP.AUTO: NEGATIVE
BUN BLD-MCNC: 11 MG/DL (ref 9–23)
CALCIUM BLD-MCNC: 9.6 MG/DL (ref 8.7–10.4)
CHLORIDE SERPL-SCNC: 105 MMOL/L (ref 98–112)
CLARITY UR REFRACT.AUTO: CLEAR
CO2 SERPL-SCNC: 25 MMOL/L (ref 21–32)
COLOR UR AUTO: COLORLESS
CREAT BLD-MCNC: 0.69 MG/DL
EGFRCR SERPLBLD CKD-EPI 2021: 96 ML/MIN/1.73M2 (ref 60–?)
EOSINOPHIL # BLD AUTO: 0.09 X10(3) UL (ref 0–0.7)
EOSINOPHIL NFR BLD AUTO: 1.2 %
ERYTHROCYTE [DISTWIDTH] IN BLOOD BY AUTOMATED COUNT: 13 %
GLOBULIN PLAS-MCNC: 2.7 G/DL (ref 2–3.5)
GLUCOSE BLD-MCNC: 122 MG/DL (ref 70–99)
GLUCOSE UR STRIP.AUTO-MCNC: NORMAL MG/DL
HCT VFR BLD AUTO: 38.9 %
HGB BLD-MCNC: 12.6 G/DL
IMM GRANULOCYTES # BLD AUTO: 0.03 X10(3) UL (ref 0–1)
IMM GRANULOCYTES NFR BLD: 0.4 %
KETONES UR STRIP.AUTO-MCNC: NEGATIVE MG/DL
LEUKOCYTE ESTERASE UR QL STRIP.AUTO: NEGATIVE
LYMPHOCYTES # BLD AUTO: 1.97 X10(3) UL (ref 1–4)
LYMPHOCYTES NFR BLD AUTO: 27.1 %
MCH RBC QN AUTO: 28.8 PG (ref 26–34)
MCHC RBC AUTO-ENTMCNC: 32.4 G/DL (ref 31–37)
MCV RBC AUTO: 89 FL
MONOCYTES # BLD AUTO: 0.47 X10(3) UL (ref 0.1–1)
MONOCYTES NFR BLD AUTO: 6.5 %
NEUTROPHILS # BLD AUTO: 4.68 X10 (3) UL (ref 1.5–7.7)
NEUTROPHILS # BLD AUTO: 4.68 X10(3) UL (ref 1.5–7.7)
NEUTROPHILS NFR BLD AUTO: 64.4 %
NITRITE UR QL STRIP.AUTO: NEGATIVE
OSMOLALITY SERPL CALC.SUM OF ELEC: 285 MOSM/KG (ref 275–295)
PH UR STRIP.AUTO: 5.5 [PH] (ref 5–8)
PLATELET # BLD AUTO: 229 10(3)UL (ref 150–450)
POTASSIUM SERPL-SCNC: 4.3 MMOL/L (ref 3.5–5.1)
PROT SERPL-MCNC: 7.2 G/DL (ref 5.7–8.2)
PROT UR STRIP.AUTO-MCNC: NEGATIVE MG/DL
RBC # BLD AUTO: 4.37 X10(6)UL
RBC UR QL AUTO: NEGATIVE
SODIUM SERPL-SCNC: 137 MMOL/L (ref 136–145)
SP GR UR STRIP.AUTO: <1.005 (ref 1–1.03)
UROBILINOGEN UR STRIP.AUTO-MCNC: NORMAL MG/DL
WBC # BLD AUTO: 7.3 X10(3) UL (ref 4–11)

## 2024-08-22 PROCEDURE — S0028 INJECTION, FAMOTIDINE, 20 MG: HCPCS | Performed by: NURSE PRACTITIONER

## 2024-08-22 PROCEDURE — 96413 CHEMO IV INFUSION 1 HR: CPT

## 2024-08-22 PROCEDURE — 96415 CHEMO IV INFUSION ADDL HR: CPT

## 2024-08-22 PROCEDURE — 96417 CHEMO IV INFUS EACH ADDL SEQ: CPT

## 2024-08-22 PROCEDURE — 81003 URINALYSIS AUTO W/O SCOPE: CPT

## 2024-08-22 PROCEDURE — 96375 TX/PRO/DX INJ NEW DRUG ADDON: CPT

## 2024-08-22 PROCEDURE — 99215 OFFICE O/P EST HI 40 MIN: CPT | Performed by: NURSE PRACTITIONER

## 2024-08-22 RX ORDER — DIPHENHYDRAMINE HYDROCHLORIDE 50 MG/ML
50 INJECTION INTRAMUSCULAR; INTRAVENOUS ONCE
Status: CANCELLED | OUTPATIENT
Start: 2024-08-22

## 2024-08-22 RX ORDER — FAMOTIDINE 10 MG/ML
20 INJECTION, SOLUTION INTRAVENOUS ONCE
Status: CANCELLED | OUTPATIENT
Start: 2024-08-22

## 2024-08-22 RX ORDER — PRAVASTATIN SODIUM 10 MG
10 TABLET ORAL EVERY EVENING
COMMUNITY

## 2024-08-22 RX ORDER — FAMOTIDINE 10 MG/ML
20 INJECTION, SOLUTION INTRAVENOUS ONCE
Status: COMPLETED | OUTPATIENT
Start: 2024-08-22 | End: 2024-08-22

## 2024-08-22 RX ORDER — DIPHENHYDRAMINE HYDROCHLORIDE 50 MG/ML
50 INJECTION INTRAMUSCULAR; INTRAVENOUS ONCE
Status: COMPLETED | OUTPATIENT
Start: 2024-08-22 | End: 2024-08-22

## 2024-08-22 RX ORDER — CARVEDILOL 6.25 MG/1
6.25 TABLET ORAL 2 TIMES DAILY
COMMUNITY

## 2024-08-22 RX ADMIN — DIPHENHYDRAMINE HYDROCHLORIDE 50 MG: 50 INJECTION INTRAMUSCULAR; INTRAVENOUS at 10:43:00

## 2024-08-22 RX ADMIN — FAMOTIDINE 20 MG: 10 INJECTION, SOLUTION INTRAVENOUS at 10:37:00

## 2024-08-22 NOTE — PROGRESS NOTES
Cancer Center ANP Visit Note    Patient Name: Rosario Benitez   YOB: 1960   Medical Record Number: [id number]   Date of visit: 8/22/2024     Chief Complaint/Reason for Visit:  Chief Complaint   Patient presents with    Follow - Up    Chemotherapy     History of Present Illness:     Rosario Benitez is a 63 year old patient of Dr. 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 She is accompanied by her daughter.     She reports a vague RLQ pain that does not radiation, which has been present since surgery but worsened about 1 week ago. It is present with activity like walking and worsens when her bladder is full. Pain resolves when she is sitting/laying. Because of the pain and her desire to limit medications, she has stopped all unnecessary movement (previously walking for exercise 3x/day). Denies fevers, chills, urinary symptoms, N/V/C/D, blood in stool. Compared to last week when she first noticed the worsening pain, it is less intense and less frequent. She used OTC pain patches to the affected area with some relief of symptoms. Has not taken any other analgesics for this.     Reviewed available chart notes, labs, and imaging.    History:     Past medical, surgical, social and family history reviewed with the patient and updated as appropriate in the chart.    Allergies:  No Known Allergies    Medications:    Current Outpatient Medications:     carvedilol 6.25 MG Oral Tab, Take 1 tablet (6.25 mg total) by mouth 2 (two) times daily., Disp: , Rfl:     pravastatin 10 MG Oral Tab, Take 1 tablet (10 mg total) by mouth every evening., Disp: , Rfl:     metFORMIN 500 MG Oral Tab, Take 1 tablet (500 mg total) by mouth 2 (two) times daily with meals., Disp: , Rfl:     losartan 25 MG Oral Tab, Take 1 tablet (25 mg total) by mouth daily., Disp: , Rfl:     aspirin 81 MG Oral Chew Tab, Chew 1 tablet (81 mg total) by mouth daily., Disp: , Rfl:      ondansetron 8 MG Oral Tablet Dispersible, Take 1 tablet (8 mg total) by mouth every 8 (eight) hours as needed for Nausea. (Patient not taking: Reported on 8/22/2024), Disp: 30 tablet, Rfl: 0    prochlorperazine (COMPAZINE) 10 mg tablet, Take 1 tablet (10 mg total) by mouth every 6 (six) hours as needed for Nausea. (Patient not taking: Reported on 8/22/2024), Disp: 30 tablet, Rfl: 3    Review of Systems:  A comprehensive 14 point review of systems was completed.  Pertinent positives and negatives noted in the HPI.    Performance Status: ECOG 1    Vital Signs:  BP (!) 162/77 (BP Location: Left arm, Patient Position: Sitting, Cuff Size: adult)   Pulse 62   Temp 97.1 °F (36.2 °C) (Temporal)   Resp 14   Ht 1.592 m (5' 2.68\")   Wt 68.1 kg (150 lb 3.2 oz)   SpO2 99%   BMI 26.88 kg/m²     Physical Examination:  General: Well developed, casually dressed, appropriately groomed, alert and oriented x 3, not in acute distress.  HEENT: Anicteric, conjunctivae and sclerae clear, no oropharyngeal lesion/thrush, mucous membranes are moist.   Neck:  Supple, no tenderness, no masses or adenopathy  Chest: Clear to auscultation, respirations unlabored.  Heart: Regular rate and rhythm, normal S1/2. No murmur.   Abdomen: soft, + bowel sounds, soft, non-tender in all 4 quadrants, no rebound or guarding, no CVA tenderness. Midline surgical incision is well healed.  Extremities: No edema.  Neurological: Grossly intact.   Skin: Warm, Dry, No erythema. Healing rash on back, abdomen, and chest noted.   Psych/Depression: mood and affect are appropriate.     Labs:     Recent Results (from the past 72 hour(s))   CBC W Differential W Platelet    Collection Time: 08/22/24  9:12 AM   Result Value Ref Range    WBC 7.3 4.0 - 11.0 x10(3) uL    RBC 4.37 3.80 - 5.30 x10(6)uL    HGB 12.6 12.0 - 16.0 g/dL    HCT 38.9 35.0 - 48.0 %    .0 150.0 - 450.0 10(3)uL    MCV 89.0 80.0 - 100.0 fL    MCH 28.8 26.0 - 34.0 pg    MCHC 32.4 31.0 - 37.0 g/dL     RDW 13.0 %    Neutrophil Absolute Prelim 4.68 1.50 - 7.70 x10 (3) uL    Neutrophil Absolute 4.68 1.50 - 7.70 x10(3) uL    Lymphocyte Absolute 1.97 1.00 - 4.00 x10(3) uL    Monocyte Absolute 0.47 0.10 - 1.00 x10(3) uL    Eosinophil Absolute 0.09 0.00 - 0.70 x10(3) uL    Basophil Absolute 0.03 0.00 - 0.20 x10(3) uL    Immature Granulocyte Absolute 0.03 0.00 - 1.00 x10(3) uL    Neutrophil % 64.4 %    Lymphocyte % 27.1 %    Monocyte % 6.5 %    Eosinophil % 1.2 %    Basophil % 0.4 %    Immature Granulocyte % 0.4 %   Comp Metabolic Panel (14)    Collection Time: 08/22/24  9:12 AM   Result Value Ref Range    Glucose 122 (H) 70 - 99 mg/dL    Sodium 137 136 - 145 mmol/L    Potassium 4.3 3.5 - 5.1 mmol/L    Chloride 105 98 - 112 mmol/L    CO2 25.0 21.0 - 32.0 mmol/L    Anion Gap 7 0 - 18 mmol/L    BUN 11 9 - 23 mg/dL    Creatinine 0.69 0.55 - 1.02 mg/dL    Calcium, Total 9.6 8.7 - 10.4 mg/dL    Calculated Osmolality 285 275 - 295 mOsm/kg    eGFR-Cr 96 >=60 mL/min/1.73m2    AST 15 <34 U/L    ALT 10 10 - 49 U/L    Alkaline Phosphatase 94 50 - 130 U/L    Bilirubin, Total 0.5 0.2 - 1.1 mg/dL    Total Protein 7.2 5.7 - 8.2 g/dL    Albumin 4.5 3.2 - 4.8 g/dL    Globulin  2.7 2.0 - 3.5 g/dL    A/G Ratio 1.7 1.0 - 2.0    Patient Fasting for CMP? Patient not present    UA/M WITH CULTURE REFLEX [E]    Collection Time: 08/22/24 10:02 AM    Specimen: Urine, clean catch   Result Value Ref Range    Urine Color Colorless (A) Yellow    Clarity Urine Clear Clear    Spec Gravity <1.005 (L) 1.005 - 1.030    Glucose Urine Normal Normal mg/dL    Bilirubin Urine Negative Negative    Ketones Urine Negative Negative mg/dL    Blood Urine Negative Negative    pH Urine 5.5 5.0 - 8.0    Protein Urine Negative Negative mg/dL    Urobilinogen Urine Normal Normal mg/dL    Nitrite Urine Negative Negative    Leukocyte Esterase Urine Negative Negative    Microscopic Microscopic not indicated      Impression/Plan    Endometrial carcinosarcoma: stage II (pT2  pN0), s/p radical abdominal hysterectomy with BSO and partial vaginectomy, pelvic LAD with Dr. Chavarria 7/19/24. Starting adjuvant carboplatin + paclitaxel today. Reviewed reportable toxicities. Labs reviewed, proceed with treatment as planned.     RLQ pain: likely post-operative healing pain. Abdominal exam is unremarkable. Will check UA to evaluation for UTI. Recommended resuming previous activity and using OTC analgesics to help facilitate. Discussed red flag signs and symptoms of worsening condition, when to call the office, and when to seek higher level of care.    Emotional Well Being:  I have assessed the patient's emotional well-being and any concerns about anxiety or depression.  We discussed issues of distress, coping difficulties and social support systems and currently there are no active problems.    Planned Follow Up: 1 week for toxicity check with APN, 3 weeks for MD + labs + chemo    Risk Level: HIGH - endometrial ca starting chemotherpay requiring intervention and close monitoring     The 21st Century Cures Act makes medical notes like these available to patients in the interest of transparency. Please be advised this is a medical document. Medical documents are intended to carry relevant information, facts as evident, and the clinical opinion of the practitioner. The medical note is intended as peer to peer communication and may appear blunt or direct. It is written in medical language and may contain abbreviations or verbiage that are unfamiliar.     Electronically Signed by:    Lucille Sevilla DNP, NP-C  Nurse Practitioner  [facility]

## 2024-08-22 NOTE — PATIENT INSTRUCTIONS
Anti-Nausea Medication:    Ondansetron (Zofran) -- can take every 8 hours as needed for nausea.    Prochlorperazine (Compazine) -- can take every 6 hours as needed for nausea.       IV Zofran given through your IV today at ~ 10:30 am    Oral Zofran - you can take as early as 6:30 pm tonight (before bed)    Oral Compazine - you can take this any time when you get home (before dinner)      We recommend alternating every 4 hours. EX: Zofran 8am, compazine 12pm, zofran 4pm, compazine 8pm, etc...     You do not need to wake yourself up to take anything. Start up again in the morning. Alternate every 4 hours like this for next 2-3 days.      Zofran may cause headaches and/or constipation.     You may use laxatives/stool softeners (miralax, etc) and tylenol to help with this (try to avoid advil/motrin/ibuprofen).

## 2024-08-22 NOTE — PROGRESS NOTES
Chief Complaint   Patient presents with    Follow - Up    Chemotherapy     Pt is here for treatment - C1 D1 carbo/taxol are expected. At baseline she reports a good appetite and hydration; denies N,V,D,C. She has RLQ pain that she called to surgical oncology office; worsens with standing/walking and full bowel/bladder.    Education Record    Learner:  Patient and Family Member    Disease / Diagnosis: uterine cancer    Barriers / Limitations:  None   Comments:    Method:  Brief focused   Comments:    General Topics:  Diet, Medication, Pain, Side effects and symptom management, and Plan of care reviewed   Comments:    Outcome:  Shows understanding   Comments:

## 2024-08-22 NOTE — PROGRESS NOTES
Pt here for C1D1 Drug(s) Taxol, Carboplatin.  Arrives Ambulating independently, accompanied by Family member     Patient was evaluated today by DOROTHY and Treatment Nurse.    Oral medications included in this regimen:  no    Patient confirms comprehension of cancer treatment schedule:  yes    Pregnancy screening:  Not applicable    Modifications in dose or schedule:  Yes - Next chemo on Monday 9/16 in [place]    Medications appearance and physical integrity checked by RN: yes.    Chemotherapy IV pump settings verified by 2 RNs:  Yes.  Frequency of blood return and site check throughout administration: Prior to administration, Prior to each drug, and At completion of therapy     Infusion/treatment outcome:  patient tolerated treatment without incident    Education Record    Learner:  Patient and Family Member  Barriers / Limitations:  None  Method:  Discussion and Printed material  Education / instructions given:  plan of care, next appts, nausea management  Outcome:  Shows understanding    Discharged Home, Ambulating independently, accompanied by:Family member    Patient/family verbalized understanding of future appointments: by [employer] messaging

## 2024-08-23 ENCOUNTER — TELEPHONE (OUTPATIENT)
Dept: HEMATOLOGY/ONCOLOGY | Facility: HOSPITAL | Age: 64
End: 2024-08-23

## 2024-08-23 NOTE — TELEPHONE ENCOUNTER
Rosario is returning julius's call. Rosario is feeling fine said she is not having any side effects from her first chemo treatment. T/y lissette

## 2024-08-23 NOTE — PROGRESS NOTES
Oncology Nutrition Consultation    Patient Name: Rosario Benitez  YOB: 1960  Medical Record Number: [id number]   Account Number: [id number]  Dietitian: Octavia Garcias RD, LDN    Date of visit: 8/22/2024    Diet Rx: high protein/quality as tolerated    Pertinent Dx/PMH: endometrial cancer    Past Medical History:    Diabetes (HCC)    Essential hypertension    Heart attack (HCC)       TX: carboplatin + paclitaxel     Other pertinent subjective/objective information: diet hx obtained    Pertinent Meds:    Current Outpatient Medications:     carvedilol 6.25 MG Oral Tab, Take 1 tablet (6.25 mg total) by mouth 2 (two) times daily., Disp: , Rfl:     pravastatin 10 MG Oral Tab, Take 1 tablet (10 mg total) by mouth every evening., Disp: , Rfl:     ondansetron 8 MG Oral Tablet Dispersible, Take 1 tablet (8 mg total) by mouth every 8 (eight) hours as needed for Nausea. (Patient not taking: Reported on 8/22/2024), Disp: 30 tablet, Rfl: 0    prochlorperazine (COMPAZINE) 10 mg tablet, Take 1 tablet (10 mg total) by mouth every 6 (six) hours as needed for Nausea. (Patient not taking: Reported on 8/22/2024), Disp: 30 tablet, Rfl: 3    metFORMIN 500 MG Oral Tab, Take 1 tablet (500 mg total) by mouth 2 (two) times daily with meals., Disp: , Rfl:     losartan 25 MG Oral Tab, Take 1 tablet (25 mg total) by mouth daily., Disp: , Rfl:     aspirin 81 MG Oral Chew Tab, Chew 1 tablet (81 mg total) by mouth daily., Disp: , Rfl:     Pertinent Labs: noted    Height:  5'2.68\"           IBW: 115 +/- 10%    WT HX:   Wt Readings from Last 9 Encounters:   08/22/24 68.1 kg (150 lb 3.2 oz)   08/14/24 68.7 kg (151 lb 6.4 oz)   08/14/24 68.1 kg (150 lb 3.2 oz)   08/09/24 59.9 kg (132 lb)   08/08/24 68.6 kg (151 lb 3.2 oz)   08/05/24 68.9 kg (151 lb 12.8 oz)   07/25/24 69.1 kg (152 lb 6.4 oz)   07/20/24 72.2 kg (159 lb 2.8 oz)       Estimated Nutrition Needs: 20-25 kcals/kg = 4716-9038 KCALS/d; 1.3 gms protein/kg =   90  gms/d    Services Provided: Verbal and written ix provided addressing -  importance of nutrition during tx; potential nutrition related side effects of tx and ways to address; coupons for [employer]    Assessment/Plan: RD met w/ this pleasant, talkative pt and her daughter in tx room for introduction, assessment and recommendations.     Diet hx revealed a 3 meal/d, well-balanced, high quality diet. Pt noted eggs/tomatoes/garlic/spinach and toast w/ H2O and coffee for breakfast; salad w/ chicken or Greek yogurt/berries and H2O for lunch; Greek chicken, vegetables and quinoa or salmon and vegetables for lunch and H2O; +/- yogurt or cottage cheese/fruit HS.     RD reviewed recommendations addressing all questions posed. Both pt and daughter verbalized understanding of recommendations made. RD offered support and will continue to follow throughout tx.       Thank you for allowing me to participate in the care of Rosario.     The 21st Century Cures Act makes medical notes like these available to patients in the interest of transparency. Please be advised this is a medical document. Medical documents are intended to carry relevant information, facts as evident, and the clinical opinion of the practitioner. The medical note is intended as peer to peer communication and may appear blunt or direct. It is written in medical language and may contain abbreviations or verbiage that are unfamiliar.

## 2024-08-23 NOTE — TELEPHONE ENCOUNTER
Toxicities: C1 D1 Carboplatin/Paclitaxel on 8/22/2024    I attempted to reach Rosario to see how she is feeling after her first treatment. I left a voice mail message asking her to please return my call at her earliest convenience.

## 2024-08-29 ENCOUNTER — OFFICE VISIT (OUTPATIENT)
Dept: HEMATOLOGY/ONCOLOGY | Age: 64
End: 2024-08-29
Attending: INTERNAL MEDICINE
Payer: MEDICARE

## 2024-08-29 VITALS
SYSTOLIC BLOOD PRESSURE: 129 MMHG | RESPIRATION RATE: 18 BRPM | HEIGHT: 62.68 IN | DIASTOLIC BLOOD PRESSURE: 71 MMHG | WEIGHT: 150.5 LBS | BODY MASS INDEX: 27 KG/M2 | HEART RATE: 101 BPM | OXYGEN SATURATION: 98 % | TEMPERATURE: 97 F

## 2024-08-29 DIAGNOSIS — C54.1 ENDOMETRIAL CANCER (HCC): Primary | ICD-10-CM

## 2024-08-29 PROCEDURE — 99214 OFFICE O/P EST MOD 30 MIN: CPT | Performed by: NURSE PRACTITIONER

## 2024-08-29 NOTE — PROGRESS NOTES
Pt here for day 8 with ADITIN.  Pt states Friday night she developed pain in her ankles  that lasted until Sunday.  She took tylenol, aleve and ice for her pain.      Outpatient Oncology Care Plan  Problem list:  knowledge deficit    Problems related to:    disease/disease progression    Interventions:  provided general teaching    Expected outcomes:  understands plan of care    Progress towards outcome:  making progress    Education Record    Learner:  Patient  Barriers / Limitations:  None  Method:  Brief focused  Outcome:  Shows understanding  Comments:

## 2024-08-29 NOTE — PROGRESS NOTES
[facility]cer Center Progress Note    Patient Name: Rosario Benitez   YOB: 1960   Medical Record Number: [id number]   CSN: [id number]   Date of visit: 8/29/2024     Chief Complaint/Reason for Visit:  Chief Complaint   Patient presents with    Follow - Up      History of Present Illness: Rosario presents today for follow up of endometrial cancer. She is s/p hysterectomy with Dr. Chavarria on 7/19/2024. She started adjuvant chemotherapy with carboplatin and paclitaxel last week on 8/22/2024.     Today, she reports that she had two days of moderate aching pain in both legs, mostly in her ankles. She took over-the counter pain relievers with partial relief. The pain resolved after 2 days. She took prophylactic antiemetics as recommended every 4 hours, she did not experience nausea. She had one day of constipation. She had mild fatigue. Denies other complaints. No changes to appetite or taste.     Oncology History:  -She presented to the ER in July 2024 with pelvic pain and vaginal spotting. CT CAP 7/19/24-endometrial mass extending to vaginal canal (8.6 x 7.2 x 11.2 cm).  25.   -7/19/24-endometrial mass excision with Dr. Chavarria necrotic carcinosarcoma  -7/19/24-radical abdominal hysterectomy with BSO and partial vaginectomy, pelvic LAD with Dr. Chavarria. 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  -Reviewed in tumor board-recommended adjuvant carbo/taxol followed by RT  -Started adjuvant carboplatin and paclitaxel on 8/22/24      Problem List:  Patient Active Problem List   Diagnosis    Abdominal pain of unknown etiology    Hyponatremia    Hyperglycemia    Vaginal bleeding    Endometrial mass    Acute cystitis without hematuria    Uterine neoplasm    Type 2 diabetes mellitus with diabetic neuropathy (HCC)    Sensory neuropathy    Peripheral vascular disease (HCC)    Hypercholesterolemia    Essential hypertension    Coronary artery disease involving native coronary artery of native heart  without angina pectoris    Dilated cardiomyopathy (HCC)        Medical History:  Past Medical History:    Diabetes (HCC)    Essential hypertension    Heart attack (HCC)       Surgical History:  Past Surgical History:   Procedure Laterality Date    Anesth,radical leg bone surgery         Allergies:  No Known Allergies    Family History:  Family History   Problem Relation Age of Onset    Cancer Daughter         colorectal    Cancer Daughter         thyroid    Cancer Sister     Cancer Sister         thyroid       Social History:  Social History     Socioeconomic History    Marital status: Single     Spouse name: Not on file    Number of children: Not on file    Years of education: Not on file    Highest education level: Not on file   Occupational History    Not on file   Tobacco Use    Smoking status: Former     Types: Cigarettes     Passive exposure: Never    Smokeless tobacco: Never    Tobacco comments:     QUIT 2007   Vaping Use    Vaping status: Never Used   Substance and Sexual Activity    Alcohol use: Yes     Comment: social    Drug use: Never    Sexual activity: Not on file   Other Topics Concern    Not on file   Social History Narrative    Not on file     Social Determinants of Health     Financial Resource Strain: Not on file   Food Insecurity: No Food Insecurity (7/18/2024)    Food Insecurity     Food Insecurity: Never true   Transportation Needs: No Transportation Needs (7/18/2024)    Transportation Needs     Lack of Transportation: No     Car Seat: Not on file   Physical Activity: Not on file   Stress: Not on file   Social Connections: Not on file   Housing Stability: Low Risk  (7/18/2024)    Housing Stability     Housing Instability: No     Housing Instability Emergency: Not on file     Crib or Bassinette: Not on file       Medications:    Current Outpatient Medications:     carvedilol 6.25 MG Oral Tab, Take 1 tablet (6.25 mg total) by mouth 2 (two) times daily., Disp: , Rfl:     pravastatin 10 MG Oral  Tab, Take 1 tablet (10 mg total) by mouth every evening., Disp: , Rfl:     ondansetron 8 MG Oral Tablet Dispersible, Take 1 tablet (8 mg total) by mouth every 8 (eight) hours as needed for Nausea., Disp: 30 tablet, Rfl: 0    prochlorperazine (COMPAZINE) 10 mg tablet, Take 1 tablet (10 mg total) by mouth every 6 (six) hours as needed for Nausea., Disp: 30 tablet, Rfl: 3    metFORMIN 500 MG Oral Tab, Take 1 tablet (500 mg total) by mouth 2 (two) times daily with meals., Disp: , Rfl:     losartan 25 MG Oral Tab, Take 1 tablet (25 mg total) by mouth daily., Disp: , Rfl:     aspirin 81 MG Oral Chew Tab, Chew 1 tablet (81 mg total) by mouth daily., Disp: , Rfl:     Review of Systems:  A comprehensive 14 point review of systems was completed.  Pertinent positives and negatives noted in the HPI.    Performance Status: ECOG 0    Physical Examination:  General: Patient is alert and oriented x 3, not in acute distress.  Vital Signs: Height: 159.2 cm (5' 2.68\") (08/29 1101)  Weight: 68.3 kg (150 lb 8 oz) (08/29 1101)  BSA (Calculated - sq m): 1.71 sq meters (08/29 1101)  Pulse: 101 (08/29 1101)  BP: 129/71 (08/29 1101)  Temp: 96.8 °F (36 °C) (08/29 1101)  Do Not Use - Resp Rate: --  SpO2: 98 % (08/29 1101)  HEENT: Anicteric, conjunctivae and sclerae clear, no oropharyngeal lesion/thrush, mucous membranes are moist   Chest: Clear to auscultation. Respirations unlabored.   Heart: Regular rate and rhythm.   Abdomen: Soft, non-distended, non-tender with present bowel sounds.  Extremities: No edema.  Neurological: Grossly intact.   Lymphatics: There is no palpable lymphadenopathy throughout in the cervical or supraclavicular regions.   Skin: warm, dry, no erythema or rash   Psych/Depression: mood and affect are appropriate.     Impression/Plan    Endometrial cancer: stage II, s/p hysterectomy on 7/19/2024. Due to high risk of recurrence, started adjuvant chemotherapy last week with carboplatin and paclitaxel. She tolerated with  expected side effects. May require short term pain medication for myalgias. Instructed patient she can decrease antiemetic prophylaxis to alternate Zofran and Compazine every 8 hours. Plan for 6 total cycles followed by adjuvant RT. Repeat CT C/A/P to be done after chemotherapy.     Planned Follow Up: 9/16/2024 follow up with Dr. Buckley, labs and chemo as previously scheduled    Risk Level: HIGH endometrial cancer receiving chemotherapy requiring close monitoring     The 21st Century Cures Act makes medical notes like these available to patients in the interest of transparency. Please be advised this is a medical document. Medical documents are intended to carry relevant information, facts as evident, and the clinical opinion of the practitioner. The medical note is intended as peer to peer communication and may appear blunt or direct. It is written in medical language and may contain abbreviations or verbiage that are unfamiliar.     Electronically Signed by:    Delphine Thomas, JUDITH, APRN, NP-C, AOCNP  Nurse Practitioner  [facility]

## 2024-09-12 ENCOUNTER — APPOINTMENT (OUTPATIENT)
Dept: HEMATOLOGY/ONCOLOGY | Facility: HOSPITAL | Age: 64
End: 2024-09-12
Attending: INTERNAL MEDICINE
Payer: MEDICARE

## 2024-09-12 NOTE — PROGRESS NOTES
[facility] Note    Diagnosis: Stage II: pT2 pN0 Endometrial Carcinosarcoma    Treatment History:   s/p radical abdominal hysterectomy with BSO and partial vaginectomy, pelvic LAD with Dr. Chavraria 7/19/24    Visit Diagnosis:  1. Uterine neoplasm        History of Present Illness: 63F with a PMH of DM2, HTN and CAD was referred by Dr. Chavarria    -She presented to the ER in July 2024 with pelvic pain and vaginal spotting. CT CAP 7/19/24-endometrial mass extending to vaginal canal (8.6 x 7.2 x 11.2 cm).  25.   -7/19/24-endometrial mass excision with Dr. Chavarria necrotic carcinosarcoma  -7/19/24-radical abdominal hysterectomy with BSO and partial vaginectomy, pelvic LAD with Dr. Chavarria. 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 Joint aches for 3 days with chemotherapy. Got better over time. Takes Aleve  -No nausea. Mild constipation.     Review of Systems: 12 Point ROS was completed and pertinent positives are in the HPI    Current Outpatient Medications on File Prior to Visit   Medication Sig Dispense Refill    carvedilol 6.25 MG Oral Tab Take 1 tablet (6.25 mg total) by mouth 2 (two) times daily.      pravastatin 10 MG Oral Tab Take 1 tablet (10 mg total) by mouth every evening.      ondansetron 8 MG Oral Tablet Dispersible Take 1 tablet (8 mg total) by mouth every 8 (eight) hours as needed for Nausea. 30 tablet 0    prochlorperazine (COMPAZINE) 10 mg tablet Take 1 tablet (10 mg total) by mouth every 6 (six) hours as needed for Nausea. 30 tablet 3    metFORMIN 500 MG Oral Tab Take 1 tablet (500 mg total) by mouth 2 (two) times daily with meals.      losartan 25 MG Oral Tab Take 1 tablet (25 mg total) by mouth daily.      aspirin 81 MG Oral Chew Tab Chew 1  tablet (81 mg total) by mouth daily.       Current Facility-Administered Medications on File Prior to Visit   Medication Dose Route Frequency Provider Last Rate Last Admin    [COMPLETED] ondansetron (Zofran) 16 mg, dexAMETHasone (Decadron) 20 mg in sodium chloride 0.9% 110 mL IVPB   Intravenous Once Lucille Sevilla APRN   Stopped at 08/22/24 1101    [COMPLETED] diphenhydrAMINE (Benadryl) 50 mg/mL  injection 50 mg  50 mg Intravenous Once Lucille Sevilla APRN   50 mg at 08/22/24 1043    [COMPLETED] famotidine (Pepcid) 20 mg/2mL injection 20 mg  20 mg Intravenous Once Lucille Sevilla APRN   20 mg at 08/22/24 1037    [COMPLETED] PACLitaxel (Taxol) 300 mg in sodium chloride 0.9% 550 mL infusion  175 mg/m2 (Treatment Plan Recorded) Intravenous Once Lucille Sevilla APRN   Stopped at 08/22/24 1451    [COMPLETED] CARBOplatin (Paraplatin) 680 mg in sodium chloride 0.9% 318 mL infusion (by AUC)  680 mg Intravenous Once Lucille Sevilla APRN   Stopped at 08/22/24 1528     Past Medical History:    Diabetes (HCC)    Essential hypertension    Heart attack (HCC)     Past Surgical History:   Procedure Laterality Date    Anesth,radical leg bone surgery       Social History     Socioeconomic History    Marital status: Single   Tobacco Use    Smoking status: Former     Types: Cigarettes     Passive exposure: Never    Smokeless tobacco: Never    Tobacco comments:     QUIT 2007   Vaping Use    Vaping status: Never Used   Substance and Sexual Activity    Alcohol use: Yes     Comment: social    Drug use: Never      Family History   Problem Relation Age of Onset    Cancer Daughter         colorectal    Cancer Daughter         thyroid    Cancer Sister     Cancer Sister         thyroid       Physical Exam  Height: 159.2 cm (5' 2.68\") (09/16 0900)  Weight: 68.7 kg (151 lb 8 oz) (09/16 0900)  BSA (Calculated - sq m): 1.71 sq meters (09/16 0900)  Pulse: 70 (09/16 0900)  BP: 135/79 (09/16 0900)  Temp: 96.9 °F (36.1 °C) (09/16 0900)  Do Not  Use - Resp Rate: --  SpO2: 97 % (09/16 0900)    General: NAD, AOX3  HEENT: clear op, mmm, no jvd, no scleral icterus  CV: RRR S1S2 no murmurs  Extremities: No edema   Lungs: CTAB, no increased work of breathing  Abd: soft nt nd +BS no hepatosplenomegaly  Neuro: CN: II-XII grossly intact      Results:  Lab Results   Component Value Date    WBC 6.8 09/16/2024    HGB 12.3 09/16/2024    HCT 38.5 09/16/2024    MCV 90.4 09/16/2024    .0 09/16/2024     Lab Results   Component Value Date     09/16/2024    K 4.2 09/16/2024    CO2 26.0 09/16/2024     09/16/2024    BUN 12 09/16/2024    PHOS 3.2 07/21/2024    ALB 3.7 09/16/2024       Final Diagnosis:   A.  Sigmoid epiploica:  -Fibroadipose tissue with fat necrosis associated with calcification.  -Negative for malignancy.     B.  Vaginal canal tumor:  -Necrotic carcinosarcoma.     C. Cervix, uterus, bilateral fallopian tubes and ovaries:  -Carcinosarcoma of the endometrium.  -Tumor invades the outer half of the myometrium (1.8 cm of a 2.1 cm thick myometrium).  -Tumor extends into the cervical stroma.  -Tumor measures 6 cm in greatest dimension.   -Background endometrial polyp.  -No definite lymphatic/vascular invasion.  -Bilateral parametria, negative for tumor.  -Bilateral ovaries with corpus albicantia.  -Bilateral unremarkable fallopian tubes.     D.  Bilateral pelvic lymph nodes:  -5 lymph nodes (0/5), negative for malignancy       Radiology: reviewed     Assessment and Plan:  Stage II: pT2 pN0 Endometrial Carcinosarcoma  -s/p radical abdominal hysterectomy with BSO and partial vaginectomy, pelvic LAD with Dr. Chavarria 7/19/24. We discussed that she has a higher risk of recurrence and adjuvant Carbo/taxol x 6 cycles followed by adjuvant RT is recommended. She has mild neuropathy at baseline that will need to be monitored.   -C2 Carbo/Taxol today   -Repeat CT CAP after chemotherapy     FH of Cancer; referred to genetics     [facility]

## 2024-09-16 ENCOUNTER — OFFICE VISIT (OUTPATIENT)
Dept: HEMATOLOGY/ONCOLOGY | Age: 64
End: 2024-09-16
Attending: INTERNAL MEDICINE
Payer: MEDICARE

## 2024-09-16 VITALS
BODY MASS INDEX: 27.18 KG/M2 | HEIGHT: 62.68 IN | TEMPERATURE: 97 F | SYSTOLIC BLOOD PRESSURE: 135 MMHG | HEART RATE: 70 BPM | OXYGEN SATURATION: 97 % | RESPIRATION RATE: 18 BRPM | DIASTOLIC BLOOD PRESSURE: 79 MMHG | WEIGHT: 151.5 LBS

## 2024-09-16 DIAGNOSIS — D49.59 UTERINE NEOPLASM: Primary | ICD-10-CM

## 2024-09-16 LAB
ALBUMIN SERPL-MCNC: 3.7 G/DL (ref 3.4–5)
ALBUMIN/GLOB SERPL: 1.2 {RATIO} (ref 1–2)
ALP LIVER SERPL-CCNC: 92 U/L
ALT SERPL-CCNC: 21 U/L
ANION GAP SERPL CALC-SCNC: 4 MMOL/L (ref 0–18)
AST SERPL-CCNC: 12 U/L (ref 15–37)
BASOPHILS # BLD AUTO: 0.05 X10(3) UL (ref 0–0.2)
BASOPHILS NFR BLD AUTO: 0.7 %
BILIRUB SERPL-MCNC: 0.4 MG/DL (ref 0.1–2)
BUN BLD-MCNC: 12 MG/DL (ref 9–23)
CALCIUM BLD-MCNC: 8.9 MG/DL (ref 8.5–10.1)
CHLORIDE SERPL-SCNC: 107 MMOL/L (ref 98–112)
CO2 SERPL-SCNC: 26 MMOL/L (ref 21–32)
CREAT BLD-MCNC: 0.67 MG/DL
EGFRCR SERPLBLD CKD-EPI 2021: 97 ML/MIN/1.73M2 (ref 60–?)
EOSINOPHIL # BLD AUTO: 0.07 X10(3) UL (ref 0–0.7)
EOSINOPHIL NFR BLD AUTO: 1 %
ERYTHROCYTE [DISTWIDTH] IN BLOOD BY AUTOMATED COUNT: 13.9 %
FASTING STATUS PATIENT QL REPORTED: NO
GLOBULIN PLAS-MCNC: 3.2 G/DL (ref 2.8–4.4)
GLUCOSE BLD-MCNC: 121 MG/DL (ref 70–99)
HCT VFR BLD AUTO: 38.5 %
HGB BLD-MCNC: 12.3 G/DL
IMM GRANULOCYTES # BLD AUTO: 0.01 X10(3) UL (ref 0–1)
IMM GRANULOCYTES NFR BLD: 0.1 %
LYMPHOCYTES # BLD AUTO: 1.77 X10(3) UL (ref 1–4)
LYMPHOCYTES NFR BLD AUTO: 25.9 %
MCH RBC QN AUTO: 28.9 PG (ref 26–34)
MCHC RBC AUTO-ENTMCNC: 31.9 G/DL (ref 31–37)
MCV RBC AUTO: 90.4 FL
MONOCYTES # BLD AUTO: 0.45 X10(3) UL (ref 0.1–1)
MONOCYTES NFR BLD AUTO: 6.6 %
NEUTROPHILS # BLD AUTO: 4.48 X10 (3) UL (ref 1.5–7.7)
NEUTROPHILS # BLD AUTO: 4.48 X10(3) UL (ref 1.5–7.7)
NEUTROPHILS NFR BLD AUTO: 65.7 %
OSMOLALITY SERPL CALC.SUM OF ELEC: 285 MOSM/KG (ref 275–295)
PLATELET # BLD AUTO: 164 10(3)UL (ref 150–450)
PLATELETS.RETICULATED NFR BLD AUTO: 1.6 % (ref 0–7)
POTASSIUM SERPL-SCNC: 4.2 MMOL/L (ref 3.5–5.1)
PROT SERPL-MCNC: 6.9 G/DL (ref 6.4–8.2)
RBC # BLD AUTO: 4.26 X10(6)UL
SODIUM SERPL-SCNC: 137 MMOL/L (ref 136–145)
WBC # BLD AUTO: 6.8 X10(3) UL (ref 4–11)

## 2024-09-16 PROCEDURE — 96375 TX/PRO/DX INJ NEW DRUG ADDON: CPT

## 2024-09-16 PROCEDURE — 96417 CHEMO IV INFUS EACH ADDL SEQ: CPT

## 2024-09-16 PROCEDURE — 96415 CHEMO IV INFUSION ADDL HR: CPT

## 2024-09-16 PROCEDURE — 96413 CHEMO IV INFUSION 1 HR: CPT

## 2024-09-16 PROCEDURE — S0028 INJECTION, FAMOTIDINE, 20 MG: HCPCS | Performed by: INTERNAL MEDICINE

## 2024-09-16 PROCEDURE — G2211 COMPLEX E/M VISIT ADD ON: HCPCS | Performed by: INTERNAL MEDICINE

## 2024-09-16 PROCEDURE — 99215 OFFICE O/P EST HI 40 MIN: CPT | Performed by: INTERNAL MEDICINE

## 2024-09-16 RX ORDER — DIPHENHYDRAMINE HYDROCHLORIDE 50 MG/ML
50 INJECTION INTRAMUSCULAR; INTRAVENOUS ONCE
Status: CANCELLED | OUTPATIENT
Start: 2024-09-16

## 2024-09-16 RX ORDER — DIPHENHYDRAMINE HYDROCHLORIDE 50 MG/ML
50 INJECTION INTRAMUSCULAR; INTRAVENOUS ONCE
Status: COMPLETED | OUTPATIENT
Start: 2024-09-16 | End: 2024-09-16

## 2024-09-16 RX ORDER — FAMOTIDINE 10 MG/ML
20 INJECTION, SOLUTION INTRAVENOUS ONCE
Status: COMPLETED | OUTPATIENT
Start: 2024-09-16 | End: 2024-09-16

## 2024-09-16 RX ORDER — FAMOTIDINE 10 MG/ML
20 INJECTION, SOLUTION INTRAVENOUS ONCE
Status: CANCELLED | OUTPATIENT
Start: 2024-09-16

## 2024-09-16 RX ADMIN — FAMOTIDINE 20 MG: 10 INJECTION, SOLUTION INTRAVENOUS at 10:01:00

## 2024-09-16 RX ADMIN — DIPHENHYDRAMINE HYDROCHLORIDE 50 MG: 50 INJECTION INTRAMUSCULAR; INTRAVENOUS at 09:58:00

## 2024-09-16 NOTE — PROGRESS NOTES
Pt here for C2D1 Drug(s) Taxol, Carboplatin.  Arrives Ambulating independently, accompanied by Family member      Patient was evaluated today by DOROTHY and Treatment Nurse.     Oral medications included in this regimen:  no     Patient confirms comprehension of cancer treatment schedule:  yes     Pregnancy screening:  Not applicable     Modifications in dose or schedule:  no     Medications appearance and physical integrity checked by RN: yes.     Chemotherapy IV pump settings verified by 2 RNs:  Yes.  Frequency of blood return and site check throughout administration: Prior to administration, Prior to each drug, and At completion of therapy      Infusion/treatment outcome:  patient tolerated treatment without incident     Education Record     Learner:  Patient and Family Member  Barriers / Limitations:  None  Method:  Discussion and Printed material  Education / instructions given:  plan of care, next appts,   Outcome:  Shows understanding     Discharged Home, Ambulating independently, accompanied by:Family member     Patient/family verbalized understanding of future appointments: by AVS  Pt dc home ambulatory in stable condition, no new complaints.

## 2024-09-16 NOTE — PROGRESS NOTES
Patient here for follow-up and planned C2D1 treatment. States she had some joint aches that were managed with pain medications. Nausea managed with zofran. Had one day of constipation.

## 2024-09-16 NOTE — PROGRESS NOTES
Oncology Nutrition F/U Consultation     Patient Name: Rosario Benitez  YOB: 1960  Medical Record Number: [id number]            Account Number: [id number]  Dietitian: Octavia Garcias RD, LDN     Date of visit: 9/16/2024     Diet Rx: high protein/quality as tolerated     Pertinent Dx/PMH: endometrial cancer     Past Medical History       Past Medical History:    Diabetes (HCC)    Essential hypertension    Heart attack (HCC)            TX: carboplatin + paclitaxel      Other pertinent subjective/objective information: diet hx obtained     Pertinent Meds:    Medications - Current      Current Outpatient Medications:     carvedilol 6.25 MG Oral Tab, Take 1 tablet (6.25 mg total) by mouth 2 (two) times daily., Disp: , Rfl:     pravastatin 10 MG Oral Tab, Take 1 tablet (10 mg total) by mouth every evening., Disp: , Rfl:     ondansetron 8 MG Oral Tablet Dispersible, Take 1 tablet (8 mg total) by mouth every 8 (eight) hours as needed for Nausea. (Patient not taking: Reported on 8/22/2024), Disp: 30 tablet, Rfl: 0    prochlorperazine (COMPAZINE) 10 mg tablet, Take 1 tablet (10 mg total) by mouth every 6 (six) hours as needed for Nausea. (Patient not taking: Reported on 8/22/2024), Disp: 30 tablet, Rfl: 3    metFORMIN 500 MG Oral Tab, Take 1 tablet (500 mg total) by mouth 2 (two) times daily with meals., Disp: , Rfl:     losartan 25 MG Oral Tab, Take 1 tablet (25 mg total) by mouth daily., Disp: , Rfl:     aspirin 81 MG Oral Chew Tab, Chew 1 tablet (81 mg total) by mouth daily., Disp: , Rfl:         Pertinent Labs: noted     Height:  5'2.68\"                    IBW: 115 +/- 10%     WT HX:   09/16/24 68.7 kg (151 lb 8 oz)   08/22/24 68.1 kg (150 lb 3.2 oz)   08/14/24 68.7 kg (151 lb 6.4 oz)   08/14/24 68.1 kg (150 lb 3.2 oz)   08/09/24 59.9 kg (132 lb)   08/08/24 68.6 kg (151 lb 3.2 oz)   08/05/24 68.9 kg (151 lb 12.8 oz)   07/25/24 69.1 kg (152 lb 6.4 oz)   07/20/24 72.2 kg (159 lb 2.8 oz)         Estimated  Nutrition Needs: 20-25 kcals/kg = 7202-1637 KCALS/d; 1.3 gms protein/kg =   90 gms/d     Assessment/Plan:  RD f/u w/ pt and her daughter in tx room today.     Pt noted tolerating tx well w/o nutrition related c/o.     RD offered support and will continue to monitor.      The 21st Century Cures Act makes medical notes like these available to patients in the interest of transparency. Please be advised this is a medical document. Medical documents are intended to carry relevant information, facts as evident, and the clinical opinion of the practitioner. The medical note is intended as peer to peer communication and may appear blunt or direct. It is written in medical language and may contain abbreviations or verbiage that are unfamiliar.

## 2024-10-07 ENCOUNTER — APPOINTMENT (OUTPATIENT)
Dept: HEMATOLOGY/ONCOLOGY | Age: 64
End: 2024-10-07
Attending: INTERNAL MEDICINE
Payer: MEDICARE

## 2024-10-09 NOTE — PROGRESS NOTES
[facility] Note    Diagnosis: Stage II: pT2 pN0 Endometrial Carcinosarcoma    Treatment History:   s/p radical abdominal hysterectomy with BSO and partial vaginectomy, pelvic LAD with Dr. Chavarria 7/19/24    Visit Diagnosis:  1. Endometrial cancer (HCC)          History of Present Illness: 64F with a PMH of DM2, HTN and CAD was referred by Dr. Chavarria    -She presented to the ER in July 2024 with pelvic pain and vaginal spotting. CT CAP 7/19/24-endometrial mass extending to vaginal canal (8.6 x 7.2 x 11.2 cm).  25.   -7/19/24-endometrial mass excision with Dr. Chavarria necrotic carcinosarcoma  -7/19/24-radical abdominal hysterectomy with BSO and partial vaginectomy, pelvic LAD with Dr. Chavarria. 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 Very mild neuropathy. Energy is good. No cough, dyspnea, nausea vomiting or diarrhea.   -Niece diagnosed with lymphoma     Review of Systems: 12 Point ROS was completed and pertinent positives are in the HPI    Current Outpatient Medications on File Prior to Visit   Medication Sig Dispense Refill    carvedilol 6.25 MG Oral Tab Take 1 tablet (6.25 mg total) by mouth 2 (two) times daily.      pravastatin 10 MG Oral Tab Take 1 tablet (10 mg total) by mouth every evening.      ondansetron 8 MG Oral Tablet Dispersible Take 1 tablet (8 mg total) by mouth every 8 (eight) hours as needed for Nausea. 30 tablet 0    prochlorperazine (COMPAZINE) 10 mg tablet Take 1 tablet (10 mg total) by mouth every 6 (six) hours as needed for Nausea. 30 tablet 3    metFORMIN 500 MG Oral Tab Take 1 tablet (500 mg total) by mouth 2 (two) times daily with meals.      losartan 25 MG Oral Tab Take 1 tablet (25 mg total) by mouth  daily.      aspirin 81 MG Oral Chew Tab Chew 1 tablet (81 mg total) by mouth daily. (Patient not taking: Reported on 10/10/2024)       Current Facility-Administered Medications on File Prior to Visit   Medication Dose Route Frequency Provider Last Rate Last Admin    [COMPLETED] ondansetron (Zofran) 16 mg, dexAMETHasone (Decadron) 20 mg in sodium chloride 0.9% 110 mL IVPB   Intravenous Once Matthew Buckley MD   Stopped at 09/16/24 1013    [COMPLETED] diphenhydrAMINE (Benadryl) 50 mg/mL  injection 50 mg  50 mg Intravenous Once Matthew Buckley MD   50 mg at 09/16/24 0958    [COMPLETED] famotidine (Pepcid) 20 mg/2mL injection 20 mg  20 mg Intravenous Once Matthew Buckley MD   20 mg at 09/16/24 1001    [COMPLETED] PACLitaxel (Taxol) 300 mg in sodium chloride 0.9% 550 mL infusion  175 mg/m2 (Treatment Plan Recorded) Intravenous Once Matthew Buckley MD   Stopped at 09/16/24 1347    [COMPLETED] CARBOplatin (Paraplatin) 690 mg in sodium chloride 0.9% 319 mL infusion (by AUC)  690 mg Intravenous Once Matthew Buckley MD   Stopped at 09/16/24 1417     Past Medical History:    Diabetes (HCC)    Essential hypertension    Heart attack (HCC)     Past Surgical History:   Procedure Laterality Date    Anesth,radical leg bone surgery       Social History     Socioeconomic History    Marital status: Single   Tobacco Use    Smoking status: Former     Types: Cigarettes     Passive exposure: Never    Smokeless tobacco: Never    Tobacco comments:     QUIT 2007   Vaping Use    Vaping status: Never Used   Substance and Sexual Activity    Alcohol use: Yes     Comment: social    Drug use: Never      Family History   Problem Relation Age of Onset    Cancer Daughter         colorectal    Cancer Daughter         thyroid    Cancer Sister     Cancer Sister         thyroid       Physical Exam  Height: 159.2 cm (5' 2.68\") (10/10 0940)  Weight: 68.9 kg (152 lb) (10/10 0940)  BSA (Calculated - sq m): 1.71 sq meters (10/10 0940)  Pulse: 78 (10/10  0940)  BP: 149/79 (10/10 0940)  Temp: 97.1 °F (36.2 °C) (10/10 0940)  Do Not Use - Resp Rate: --  SpO2: 99 % (10/10 0940)    General: NAD, AOX3  HEENT: clear op, mmm, no jvd, no scleral icterus  CV: RRR S1S2 no murmurs  Extremities: No edema   Lungs: CTAB, no increased work of breathing  Abd: soft nt nd +BS no hepatosplenomegaly  Neuro: CN: II-XII grossly intact      Results:  Lab Results   Component Value Date    WBC 6.8 09/16/2024    HGB 12.3 09/16/2024    HCT 38.5 09/16/2024    MCV 90.4 09/16/2024    .0 09/16/2024     Lab Results   Component Value Date     09/16/2024    K 4.2 09/16/2024    CO2 26.0 09/16/2024     09/16/2024    BUN 12 09/16/2024    PHOS 3.2 07/21/2024    ALB 3.7 09/16/2024       Final Diagnosis:   A.  Sigmoid epiploica:  -Fibroadipose tissue with fat necrosis associated with calcification.  -Negative for malignancy.     B.  Vaginal canal tumor:  -Necrotic carcinosarcoma.     C. Cervix, uterus, bilateral fallopian tubes and ovaries:  -Carcinosarcoma of the endometrium.  -Tumor invades the outer half of the myometrium (1.8 cm of a 2.1 cm thick myometrium).  -Tumor extends into the cervical stroma.  -Tumor measures 6 cm in greatest dimension.   -Background endometrial polyp.  -No definite lymphatic/vascular invasion.  -Bilateral parametria, negative for tumor.  -Bilateral ovaries with corpus albicantia.  -Bilateral unremarkable fallopian tubes.     D.  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 Chavarria 7/19/24. We discussed that she has a higher risk of recurrence and adjuvant Carbo/taxol x 6 cycles followed by adjuvant RT is recommended. She has mild neuropathy at baseline that will need to be monitored.   -C3 Carbo/Taxol today   -Repeat CT CAP after  chemotherapy     FH of Cancer; referred to genetics-she will schedule today     FRANCISCO Buckley MD  [facility]

## 2024-10-10 ENCOUNTER — OFFICE VISIT (OUTPATIENT)
Dept: HEMATOLOGY/ONCOLOGY | Facility: HOSPITAL | Age: 64
End: 2024-10-10
Attending: INTERNAL MEDICINE
Payer: MEDICARE

## 2024-10-10 VITALS
WEIGHT: 152 LBS | RESPIRATION RATE: 16 BRPM | DIASTOLIC BLOOD PRESSURE: 79 MMHG | BODY MASS INDEX: 27.27 KG/M2 | TEMPERATURE: 97 F | OXYGEN SATURATION: 99 % | HEART RATE: 78 BPM | HEIGHT: 62.68 IN | SYSTOLIC BLOOD PRESSURE: 149 MMHG

## 2024-10-10 DIAGNOSIS — C54.1 ENDOMETRIAL CANCER (HCC): Primary | ICD-10-CM

## 2024-10-10 DIAGNOSIS — D49.59 UTERINE NEOPLASM: Primary | ICD-10-CM

## 2024-10-10 LAB
ALBUMIN SERPL-MCNC: 4.6 G/DL (ref 3.2–4.8)
ALBUMIN/GLOB SERPL: 1.8 {RATIO} (ref 1–2)
ALP LIVER SERPL-CCNC: 102 U/L
ALT SERPL-CCNC: 13 U/L
ANION GAP SERPL CALC-SCNC: 7 MMOL/L (ref 0–18)
AST SERPL-CCNC: 17 U/L (ref ?–34)
BASOPHILS # BLD AUTO: 0.03 X10(3) UL (ref 0–0.2)
BASOPHILS NFR BLD AUTO: 0.6 %
BILIRUB SERPL-MCNC: 0.4 MG/DL (ref 0.2–1.1)
BUN BLD-MCNC: 13 MG/DL (ref 9–23)
CALCIUM BLD-MCNC: 9 MG/DL (ref 8.7–10.4)
CHLORIDE SERPL-SCNC: 107 MMOL/L (ref 98–112)
CO2 SERPL-SCNC: 24 MMOL/L (ref 21–32)
CREAT BLD-MCNC: 0.75 MG/DL
EGFRCR SERPLBLD CKD-EPI 2021: 88 ML/MIN/1.73M2 (ref 60–?)
EOSINOPHIL # BLD AUTO: 0.04 X10(3) UL (ref 0–0.7)
EOSINOPHIL NFR BLD AUTO: 0.8 %
ERYTHROCYTE [DISTWIDTH] IN BLOOD BY AUTOMATED COUNT: 14.6 %
FASTING STATUS PATIENT QL REPORTED: NO
GLOBULIN PLAS-MCNC: 2.6 G/DL (ref 2–3.5)
GLUCOSE BLD-MCNC: 136 MG/DL (ref 70–99)
HCT VFR BLD AUTO: 38.6 %
HGB BLD-MCNC: 12.3 G/DL
IMM GRANULOCYTES # BLD AUTO: 0.02 X10(3) UL (ref 0–1)
IMM GRANULOCYTES NFR BLD: 0.4 %
LYMPHOCYTES # BLD AUTO: 1.7 X10(3) UL (ref 1–4)
LYMPHOCYTES NFR BLD AUTO: 35.4 %
MCH RBC QN AUTO: 29 PG (ref 26–34)
MCHC RBC AUTO-ENTMCNC: 31.9 G/DL (ref 31–37)
MCV RBC AUTO: 91 FL
MONOCYTES # BLD AUTO: 0.41 X10(3) UL (ref 0.1–1)
MONOCYTES NFR BLD AUTO: 8.5 %
NEUTROPHILS # BLD AUTO: 2.6 X10 (3) UL (ref 1.5–7.7)
NEUTROPHILS # BLD AUTO: 2.6 X10(3) UL (ref 1.5–7.7)
NEUTROPHILS NFR BLD AUTO: 54.3 %
OSMOLALITY SERPL CALC.SUM OF ELEC: 288 MOSM/KG (ref 275–295)
PLATELET # BLD AUTO: 155 10(3)UL (ref 150–450)
POTASSIUM SERPL-SCNC: 4.6 MMOL/L (ref 3.5–5.1)
PROT SERPL-MCNC: 7.2 G/DL (ref 5.7–8.2)
RBC # BLD AUTO: 4.24 X10(6)UL
SODIUM SERPL-SCNC: 138 MMOL/L (ref 136–145)
WBC # BLD AUTO: 4.8 X10(3) UL (ref 4–11)

## 2024-10-10 PROCEDURE — 96415 CHEMO IV INFUSION ADDL HR: CPT

## 2024-10-10 PROCEDURE — S0028 INJECTION, FAMOTIDINE, 20 MG: HCPCS | Performed by: INTERNAL MEDICINE

## 2024-10-10 PROCEDURE — 85025 COMPLETE CBC W/AUTO DIFF WBC: CPT

## 2024-10-10 PROCEDURE — 80053 COMPREHEN METABOLIC PANEL: CPT

## 2024-10-10 PROCEDURE — 96417 CHEMO IV INFUS EACH ADDL SEQ: CPT

## 2024-10-10 PROCEDURE — 96375 TX/PRO/DX INJ NEW DRUG ADDON: CPT

## 2024-10-10 PROCEDURE — 99215 OFFICE O/P EST HI 40 MIN: CPT | Performed by: INTERNAL MEDICINE

## 2024-10-10 PROCEDURE — G2211 COMPLEX E/M VISIT ADD ON: HCPCS | Performed by: INTERNAL MEDICINE

## 2024-10-10 PROCEDURE — 96413 CHEMO IV INFUSION 1 HR: CPT

## 2024-10-10 RX ORDER — FAMOTIDINE 10 MG/ML
20 INJECTION, SOLUTION INTRAVENOUS ONCE
Status: COMPLETED | OUTPATIENT
Start: 2024-10-10 | End: 2024-10-10

## 2024-10-10 RX ORDER — DIPHENHYDRAMINE HYDROCHLORIDE 50 MG/ML
50 INJECTION INTRAMUSCULAR; INTRAVENOUS ONCE
Status: COMPLETED | OUTPATIENT
Start: 2024-10-10 | End: 2024-10-10

## 2024-10-10 RX ADMIN — FAMOTIDINE 20 MG: 10 INJECTION, SOLUTION INTRAVENOUS at 10:20:00

## 2024-10-10 RX ADMIN — DIPHENHYDRAMINE HYDROCHLORIDE 50 MG: 50 INJECTION INTRAMUSCULAR; INTRAVENOUS at 10:16:00

## 2024-10-10 NOTE — PROGRESS NOTES
Pt here for C3D1 Drug(s)Taxol/Carbo.  Arrives Ambulating independently, accompanied by Family member     Patient was evaluated today by MD.    Oral medications included in this regimen:  no    Patient confirms comprehension of cancer treatment schedule:  yes    Pregnancy screening:  Not applicable    Modifications in dose or schedule:  No    Medications appearance and physical integrity checked by RN: yes.    Chemotherapy IV pump settings verified by 2 RNs:  Yes.  Frequency of blood return and site check throughout administration: Prior to administration and At completion of therapy     Infusion/treatment outcome:  patient tolerated treatment without incident    Education Record    Learner:  Patient and Family Member  Barriers / Limitations:  None  Method:  Brief focused  Education / instructions given:  schedule reviewed  Outcome:  Shows understanding    Discharged Home, Ambulating independently, accompanied by:Family member    Patient/family verbalized understanding of future appointments: by MyChart messaging

## 2024-11-01 NOTE — PROGRESS NOTES
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 Chavarria 7/19/24    Visit Diagnosis:  1. Uterine neoplasm        History of Present Illness: 64F with a PMH of DM2, HTN and CAD was referred by Dr. Chavarria    -She presented to the ER in July 2024 with pelvic pain and vaginal spotting. CT CAP 7/19/24-endometrial mass extending to vaginal canal (8.6 x 7.2 x 11.2 cm).  25.   -7/19/24-endometrial mass excision with Dr. Chavarria necrotic carcinosarcoma  -7/19/24-radical abdominal hysterectomy with BSO and partial vaginectomy, pelvic LAD with Dr. Chavarria. 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  -Reviewed in tumor board-recommended adjuvant carbo/taxol followed by RT  -FH of malignancy as below    -Adjuvant Carboplatin + Paclitaxel   -C1: 8/22/24   -C2: 9/16/24   -C3: 10/14/24   -C4: 11/4/24    Interval History  -Doing well overall. No neuropathy. Energy is good. No cough, dyspnea, abdominal pain or diarrhea. Nausea controlled.     Review of Systems: 12 Point ROS was completed and pertinent positives are in the HPI    Current Outpatient Medications on File Prior to Visit   Medication Sig Dispense Refill    carvedilol 6.25 MG Oral Tab Take 1 tablet (6.25 mg total) by mouth 2 (two) times daily.      pravastatin 10 MG Oral Tab Take 1 tablet (10 mg total) by mouth every evening.      metFORMIN 500 MG Oral Tab Take 1 tablet (500 mg total) by mouth 2 (two) times daily with meals.      losartan 25 MG Oral Tab Take 1 tablet (25 mg total) by mouth daily.      aspirin 81 MG Oral Chew Tab Chew 1 tablet (81 mg total) by mouth daily. (Patient not taking: Reported on 11/4/2024)       Current Facility-Administered Medications on File Prior to Visit   Medication Dose Route Frequency Provider Last Rate Last Admin    [COMPLETED] ondansetron  (Zofran) 16 mg, dexAMETHasone (Decadron) 20 mg in sodium chloride 0.9% 110 mL IVPB   Intravenous Once Matthew Buckley MD   Stopped at 10/10/24 1037    [COMPLETED] diphenhydrAMINE (Benadryl) 50 mg/mL  injection 50 mg  50 mg Intravenous Once Matthew Buckley MD   50 mg at 10/10/24 1016    [COMPLETED] famotidine (Pepcid) 20 mg/2mL injection 20 mg  20 mg Intravenous Once Matthew Buckley MD   20 mg at 10/10/24 1020    [COMPLETED] PACLitaxel (Taxol) 300 mg in sodium chloride 0.9% 550 mL infusion  175 mg/m2 (Treatment Plan Recorded) Intravenous Once Matthew Buckley MD   Stopped at 10/10/24 1416    [COMPLETED] CARBOplatin (Paraplatin) 630 mg in sodium chloride 0.9% 313 mL infusion (by AUC)  630 mg Intravenous Once Matthew Buckley MD   Stopped at 10/10/24 1456     Past Medical History:    Diabetes (HCC)    Essential hypertension    Heart attack (HCC)     Past Surgical History:   Procedure Laterality Date    Anesth,radical leg bone surgery       Social History     Socioeconomic History    Marital status: Single   Tobacco Use    Smoking status: Former     Types: Cigarettes     Passive exposure: Never    Smokeless tobacco: Never    Tobacco comments:     QUIT 2007   Vaping Use    Vaping status: Never Used   Substance and Sexual Activity    Alcohol use: Yes     Comment: social    Drug use: Never      Family History   Problem Relation Age of Onset    Cancer Daughter         colorectal    Cancer Daughter         thyroid    Cancer Sister     Cancer Sister         thyroid       Physical Exam  Height: 159.2 cm (5' 2.68\") (11/04 0824)  Weight: 71.7 kg (158 lb) (11/04 0824)  BSA (Calculated - sq m): 1.74 sq meters (11/04 0824)  Pulse: 89 (11/04 0824)  BP: 151/75 (11/04 0824)  Temp: 96.2 °F (35.7 °C) (11/04 0824)  Do Not Use - Resp Rate: --  SpO2: 98 % (11/04 0824)    General: NAD, AOX3  HEENT: clear op, mmm, no jvd, no scleral icterus  CV: RRR S1S2 no murmurs  Extremities: No edema   Lungs: CTAB, no increased work of breathing  Abd:  soft nt nd +BS no hepatosplenomegaly  Neuro: CN: II-XII grossly intact      Results:  Lab Results   Component Value Date    WBC 6.6 11/04/2024    HGB 12.2 11/04/2024    HCT 36.9 11/04/2024    MCV 91.1 11/04/2024    .0 11/04/2024     Lab Results   Component Value Date     (L) 11/04/2024    K 4.8 11/04/2024    CO2 21.0 11/04/2024     11/04/2024    BUN 19 11/04/2024    PHOS 3.2 07/21/2024    ALB 3.5 11/04/2024       Final Diagnosis:   A.  Sigmoid epiploica:  -Fibroadipose tissue with fat necrosis associated with calcification.  -Negative for malignancy.     B.  Vaginal canal tumor:  -Necrotic carcinosarcoma.     C. Cervix, uterus, bilateral fallopian tubes and ovaries:  -Carcinosarcoma of the endometrium.  -Tumor invades the outer half of the myometrium (1.8 cm of a 2.1 cm thick myometrium).  -Tumor extends into the cervical stroma.  -Tumor measures 6 cm in greatest dimension.   -Background endometrial polyp.  -No definite lymphatic/vascular invasion.  -Bilateral parametria, negative for tumor.  -Bilateral ovaries with corpus albicantia.  -Bilateral unremarkable fallopian tubes.     D.  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 Chavarria 7/19/24. We discussed that she has a higher risk of recurrence and adjuvant Carbo/taxol x 6 cycles followed by adjuvant RT is recommended. She has mild neuropathy at baseline that will need to be monitored.   -C4 Carbo/Taxol today   -Repeat CT CAP after chemotherapy   -Refer to radiation oncology on next visit     FH of Cancer; referred to genetics    [facility]

## 2024-11-04 ENCOUNTER — OFFICE VISIT (OUTPATIENT)
Dept: HEMATOLOGY/ONCOLOGY | Age: 64
End: 2024-11-04
Attending: INTERNAL MEDICINE
Payer: MEDICARE

## 2024-11-04 VITALS
DIASTOLIC BLOOD PRESSURE: 75 MMHG | BODY MASS INDEX: 28.35 KG/M2 | HEIGHT: 62.68 IN | HEART RATE: 89 BPM | OXYGEN SATURATION: 98 % | SYSTOLIC BLOOD PRESSURE: 151 MMHG | RESPIRATION RATE: 18 BRPM | WEIGHT: 158 LBS | TEMPERATURE: 96 F

## 2024-11-04 DIAGNOSIS — D49.59 UTERINE NEOPLASM: Primary | ICD-10-CM

## 2024-11-04 LAB
ALBUMIN SERPL-MCNC: 3.5 G/DL (ref 3.4–5)
ALBUMIN/GLOB SERPL: 0.9 {RATIO} (ref 1–2)
ALP LIVER SERPL-CCNC: 104 U/L
ALT SERPL-CCNC: 23 U/L
ANION GAP SERPL CALC-SCNC: 5 MMOL/L (ref 0–18)
AST SERPL-CCNC: 16 U/L (ref 15–37)
BASOPHILS # BLD AUTO: 0.03 X10(3) UL (ref 0–0.2)
BASOPHILS NFR BLD AUTO: 0.5 %
BILIRUB SERPL-MCNC: 0.3 MG/DL (ref 0.1–2)
BUN BLD-MCNC: 19 MG/DL (ref 9–23)
CALCIUM BLD-MCNC: 8.9 MG/DL (ref 8.5–10.1)
CHLORIDE SERPL-SCNC: 107 MMOL/L (ref 98–112)
CO2 SERPL-SCNC: 21 MMOL/L (ref 21–32)
CREAT BLD-MCNC: 0.71 MG/DL
EGFRCR SERPLBLD CKD-EPI 2021: 94 ML/MIN/1.73M2 (ref 60–?)
EOSINOPHIL # BLD AUTO: 0.08 X10(3) UL (ref 0–0.7)
EOSINOPHIL NFR BLD AUTO: 1.2 %
ERYTHROCYTE [DISTWIDTH] IN BLOOD BY AUTOMATED COUNT: 15.8 %
GLOBULIN PLAS-MCNC: 3.7 G/DL (ref 2.8–4.4)
GLUCOSE BLD-MCNC: 154 MG/DL (ref 70–99)
HCT VFR BLD AUTO: 36.9 %
HGB BLD-MCNC: 12.2 G/DL
IMM GRANULOCYTES # BLD AUTO: 0.03 X10(3) UL (ref 0–1)
IMM GRANULOCYTES NFR BLD: 0.5 %
LYMPHOCYTES # BLD AUTO: 1.81 X10(3) UL (ref 1–4)
LYMPHOCYTES NFR BLD AUTO: 27.3 %
MCH RBC QN AUTO: 30.1 PG (ref 26–34)
MCHC RBC AUTO-ENTMCNC: 33.1 G/DL (ref 31–37)
MCV RBC AUTO: 91.1 FL
MONOCYTES # BLD AUTO: 0.64 X10(3) UL (ref 0.1–1)
MONOCYTES NFR BLD AUTO: 9.7 %
NEUTROPHILS # BLD AUTO: 4.04 X10 (3) UL (ref 1.5–7.7)
NEUTROPHILS # BLD AUTO: 4.04 X10(3) UL (ref 1.5–7.7)
NEUTROPHILS NFR BLD AUTO: 60.8 %
OSMOLALITY SERPL CALC.SUM OF ELEC: 281 MOSM/KG (ref 275–295)
PLATELET # BLD AUTO: 160 10(3)UL (ref 150–450)
POTASSIUM SERPL-SCNC: 4.8 MMOL/L (ref 3.5–5.1)
PROT SERPL-MCNC: 7.2 G/DL (ref 6.4–8.2)
RBC # BLD AUTO: 4.05 X10(6)UL
SODIUM SERPL-SCNC: 133 MMOL/L (ref 136–145)
WBC # BLD AUTO: 6.6 X10(3) UL (ref 4–11)

## 2024-11-04 PROCEDURE — 96415 CHEMO IV INFUSION ADDL HR: CPT

## 2024-11-04 PROCEDURE — 96375 TX/PRO/DX INJ NEW DRUG ADDON: CPT

## 2024-11-04 PROCEDURE — 99215 OFFICE O/P EST HI 40 MIN: CPT | Performed by: INTERNAL MEDICINE

## 2024-11-04 PROCEDURE — S0028 INJECTION, FAMOTIDINE, 20 MG: HCPCS | Performed by: INTERNAL MEDICINE

## 2024-11-04 PROCEDURE — G2211 COMPLEX E/M VISIT ADD ON: HCPCS | Performed by: INTERNAL MEDICINE

## 2024-11-04 PROCEDURE — 96417 CHEMO IV INFUS EACH ADDL SEQ: CPT

## 2024-11-04 PROCEDURE — 96413 CHEMO IV INFUSION 1 HR: CPT

## 2024-11-04 RX ORDER — ONDANSETRON 8 MG/1
8 TABLET, ORALLY DISINTEGRATING ORAL EVERY 8 HOURS PRN
Qty: 30 TABLET | Refills: 0 | Status: SHIPPED | OUTPATIENT
Start: 2024-11-04

## 2024-11-04 RX ORDER — FAMOTIDINE 10 MG/ML
20 INJECTION, SOLUTION INTRAVENOUS ONCE
Status: COMPLETED | OUTPATIENT
Start: 2024-11-04 | End: 2024-11-04

## 2024-11-04 RX ORDER — PROCHLORPERAZINE MALEATE 10 MG
10 TABLET ORAL EVERY 6 HOURS PRN
Qty: 30 TABLET | Refills: 3 | Status: SHIPPED | OUTPATIENT
Start: 2024-11-04

## 2024-11-04 RX ORDER — DIPHENHYDRAMINE HYDROCHLORIDE 50 MG/ML
50 INJECTION INTRAMUSCULAR; INTRAVENOUS ONCE
Status: CANCELLED | OUTPATIENT
Start: 2024-11-04

## 2024-11-04 RX ORDER — FAMOTIDINE 10 MG/ML
20 INJECTION, SOLUTION INTRAVENOUS ONCE
Status: CANCELLED | OUTPATIENT
Start: 2024-11-04

## 2024-11-04 RX ORDER — DIPHENHYDRAMINE HYDROCHLORIDE 50 MG/ML
50 INJECTION INTRAMUSCULAR; INTRAVENOUS ONCE
Status: COMPLETED | OUTPATIENT
Start: 2024-11-04 | End: 2024-11-04

## 2024-11-04 RX ADMIN — FAMOTIDINE 20 MG: 10 INJECTION, SOLUTION INTRAVENOUS at 09:43:00

## 2024-11-04 RX ADMIN — DIPHENHYDRAMINE HYDROCHLORIDE 50 MG: 50 INJECTION INTRAMUSCULAR; INTRAVENOUS at 09:41:00

## 2024-11-04 NOTE — PROGRESS NOTES
Patient here for follow-up and planned C4D1 treatment. Will meet with genetics 11/13. Denies any nausea, fatigue, pain, or other symptoms at this time.

## 2024-11-04 NOTE — PROGRESS NOTES
Oncology Nutrition F/U Consultation     Patient Name: Rosario Benitez  YOB: 1960  Medical Record Number: [id number]            Account Number: [id number]  Dietitian: Octavia Garcias RD, LDN     Date of visit: 11/4/2024     Diet Rx: high protein/quality as tolerated     Pertinent Dx/PMH: endometrial cancer     Past Medical History         Past Medical History:    Diabetes (HCC)    Essential hypertension    Heart attack (HCC)            TX: carboplatin + paclitaxel      Other pertinent subjective/objective information: diet hx obtained     Pertinent Meds:     Medications - Current      Current Outpatient Medications:     carvedilol 6.25 MG Oral Tab, Take 1 tablet (6.25 mg total) by mouth 2 (two) times daily., Disp: , Rfl:     pravastatin 10 MG Oral Tab, Take 1 tablet (10 mg total) by mouth every evening., Disp: , Rfl:     ondansetron 8 MG Oral Tablet Dispersible, Take 1 tablet (8 mg total) by mouth every 8 (eight) hours as needed for Nausea. (Patient not taking: Reported on 8/22/2024), Disp: 30 tablet, Rfl: 0    prochlorperazine (COMPAZINE) 10 mg tablet, Take 1 tablet (10 mg total) by mouth every 6 (six) hours as needed for Nausea. (Patient not taking: Reported on 8/22/2024), Disp: 30 tablet, Rfl: 3    metFORMIN 500 MG Oral Tab, Take 1 tablet (500 mg total) by mouth 2 (two) times daily with meals., Disp: , Rfl:     losartan 25 MG Oral Tab, Take 1 tablet (25 mg total) by mouth daily., Disp: , Rfl:     aspirin 81 MG Oral Chew Tab, Chew 1 tablet (81 mg total) by mouth daily., Disp: , Rfl:          Pertinent Labs: noted     Height:  5'2.68\"                    IBW: 115 +/- 10%     WT HX:   11/4/24 71.7 kg (158 lb)   10/10/24 68.9 kg (152 lb)   09/16/24 68.7 kg (151 lb 8 oz)   08/22/24 68.1 kg (150 lb 3.2 oz)   08/14/24 68.7 kg (151 lb 6.4 oz)   08/14/24 68.1 kg (150 lb 3.2 oz)   08/09/24 59.9 kg (132 lb)   08/08/24 68.6 kg (151 lb 3.2 oz)   08/05/24 68.9 kg (151 lb 12.8 oz)   07/25/24 69.1 kg (152 lb 6.4 oz)    07/20/24 72.2 kg (159 lb 2.8 oz)         Estimated Nutrition Needs: 20-25 kcals/kg = 2135-1427 KCALS/d; 1.3 gms protein/kg =   90 gms/d     Assessment/Plan:  RD f/u w/ pt and her daughter in tx room today.      Pt noted continues tolerating tx well w/o nutrition related c/o. She noted unplanned wt gain. She is keeping up w/ activity and watching food choices. She noted 2 more chemotherapy tx then will have RT most likely starting in January.      RD offered support and will continue to monitor.      The 21st Century Cures Act makes medical notes like these available to patients in the interest of transparency. Please be advised this is a medical document. Medical documents are intended to carry relevant information, facts as evident, and the clinical opinion of the practitioner. The medical note is intended as peer to peer communication and may appear blunt or direct. It is written in medical language and may contain abbreviations or verbiage that are unfamiliar.

## 2024-11-13 ENCOUNTER — GENETICS ENCOUNTER (OUTPATIENT)
Dept: GENETICS | Facility: HOSPITAL | Age: 64
End: 2024-11-13
Attending: INTERNAL MEDICINE
Payer: MEDICARE

## 2024-11-13 ENCOUNTER — NURSE ONLY (OUTPATIENT)
Dept: HEMATOLOGY/ONCOLOGY | Facility: HOSPITAL | Age: 64
End: 2024-11-13
Attending: INTERNAL MEDICINE
Payer: MEDICARE

## 2024-11-13 DIAGNOSIS — D49.59 UTERINE NEOPLASM: Primary | ICD-10-CM

## 2024-11-13 DIAGNOSIS — Z80.9 FAMILY HISTORY OF CANCER: ICD-10-CM

## 2024-11-13 DIAGNOSIS — C54.1 MALIGNANT NEOPLASM OF ENDOMETRIUM (HCC): Primary | ICD-10-CM

## 2024-11-13 DIAGNOSIS — Z80.0 FAMILY HISTORY OF COLON CANCER: ICD-10-CM

## 2024-11-13 PROCEDURE — 96040 HC GENETIC COUNSELING EA 30 MIN: CPT | Performed by: GENETIC COUNSELOR, MS

## 2024-11-13 PROCEDURE — 36415 COLL VENOUS BLD VENIPUNCTURE: CPT

## 2024-11-13 NOTE — PROGRESS NOTES
Patient Name: Rosario Benitez  YOB: 1960  Date of Visit: 11/13/2024    Reason for visit: Ms. Benitez was seen for the purposes of genetic counseling due to her recent diagnosis of endometrial carcinosarcoma at age 63y and a family history of cancer. The purpose of this visit was to review information regarding genetic testing options for mutations in high penetrance cancer susceptibility genes.    Referring Provider: Matthew Buckley MD    Medical History: Ms. Benitez is a pleasant 64 year old female presenting with stage II (pT2, pN0) carcinosarcoma of the endometrium (pMMR) diagnosed on 7/19/2024 at age 63y. She is s/p surgical resection of the mass with radical abdominal hysterectomy w/ BSO, partial vaginectomy, and pelvic LND (0/5 LNs) with Dr. Chavarria on 7/19/2024. She is now receiving adjuvant carbo/taxol planned for 6 cycles, currently had C4 on 11/4/24, with Dr. Buckley. RT is planned following chemo.     She denies any other cancer hx.     Ms. Benitez achieved menarche at approximately 11 years of age, is post-menopausal (50s), and has been pregnant 3 times, delivering her first of 3 children at 19y. Ms. Benitez has a 0-year history of oral contraceptive use and denies any fertility or hormone replacement use.      Relevant Family History: Ms. Benitez has 3 daughters (43y, 40y, 39y). Her 43-year-old daughter was diagnosed with colorectal cancer last year at ~42y, s/p surgical resection, she reportedly had negative genetic testing (no report), she also has a h/o multiple osteochondromas. The 39-year-old daughter was dx with thyroid cancer ~26y s/p surgery, she also has a h/o multiple osteochondromas as does one of her 2 sons.     Ms. Benitez has 2 sisters (73y, 70y). Her 73-year-old sister was dx with lung cancer in her late-60s, she has 5 children, 1 of her daughters recently was dx with lymphoma in her 40s and is currently receiving chemo. The 70-year-old sister has a h/o thyroid cancer dx  in her 40-50s, she has 2 sons and 1 daughter, the daughter was dx with NHL in her 20s.    Ms. Benitez's mother  at 94y dt natural causes with no cancer hx. There were 3 maternal uncles (d. ~80s) and 1 maternal aunt (d. Young dt childbirth complications), all with no cancer hx in themselves or their children. The maternal grandparents both  >50y with no cancer hx.    Ms. Vieiras father  at 86y dt lung cancer dx in his 80s. There were 2 paternal uncles (d. 80s) with no cancer hx in them or their children. The paternal grandparents both  >50y with no cancer hx.    The rest of the family history is negative for other significant genetic conditions, cancers, or birth defects of any kind. See scanned pedigree for full family history reported during the session.    Ms. Peace maternal and paternal ethnicity is [place].     Summary: The majority of cancers are sporadic whereas approximately 10-30% of cases of cancer cases are attributed to familial factors such as unidentified low penetrance genes in the family or shared environmental factors.  Approximately 5-10% of cancers are related to a hereditary cancer syndrome.  Signs of a hereditary cancer syndrome include some rare cancers, common cancers occurring at unusually young ages, multiple primary cancers in the same individual, multiple colorectal polyps, or the same type of cancer or related cancers (e.g., breast and ovarian, colorectal and endometrial) in three or more individuals in the same lineage.     Cancer is usually caused by gene mutations that occur randomly in one or a few cells of the body. Such gene changes, called somatic mutations, may arise as a natural consequence of aging or when a cell’s DNA has been damaged. Acquired mutations are only present in some of the body’s cells, and they are not passed on from parents to their children.    However, in the small percentage of people with a hereditary cancer syndrome, the disease is due  to a different type of mutation called a hereditary mutation, or germline mutation. These mutations are usually inherited from one or both of the person’s parents, and are present in nearly every cell of the body. Because hereditary mutations are present in the DNA of sperm and egg cells, they can be passed down in families. People who carry such hereditary mutations do not necessarily get cancer, but their risk of developing the disease at some point during their lifetime is higher than average. When a personal and/or family history doesn't clearly fit a single hereditary cancer syndrome, panel genetic testing examining multiple genes in which pathogenic mutations cause hereditary cancer syndromes is appropriate.     If testing is performed, three results are possible: positive, negative, and variant of uncertain significance.  A positive result indicates a pathogenic variant (harmful gene mutation) has been identified, and there is an increased risk for the cancers associated with the specific gene.  Since pathogenic variants in most cancer susceptibility genes are inherited in an autosomal dominant fashion, siblings and children of individuals with a pathogenic variant have a 50% risk of carrying the same familial pathogenic variant as well.      A negative test result would indicate that no pathogenic variant was identified in a cancer susceptibility gene.  While testing detects gene mutations, it is possible for a genetic variant to be present and go undetected.  It is also possible for a genetic variant to be located in a gene other than those being tested.     A variant of uncertain significance means that a change has been identified a cancer susceptibility gene; however, it is uncertain if the variant is pathogenic or a non-deleterious (benign) change.  With time, the variant may be reclassified as either pathogenic or benign.    Since pathogenic variant identification is necessary, an affected family  member is preferred in order to confirm that a pathogenic variant is indeed present in a particular family.  If the pathogenic variant can be identified in an affected individual, this information can be used to screen other at-risk individuals in the family.  Individuals who are positive for the same pathogenic variant would be expected to have a much greater risk for developing hereditary cancer during their lifetime than the general population and surveillance and management would be rigorous.  For those individuals who are found to not carry the pathogenic variant, risks for developing cancer during their lifetime would return to those expected for individuals in the general population.  It should be emphasized that absence of a pathogenic variant in an at-risk individual would not eliminate their risk for cancer, but simply return them to the risk expected for the general population. If no affected individual is available for testing, a negative result, while reassuring, cannot be completely informative of the familial cancer risk as it is unknown whether no pathogenic variant was found because the individual tested is truly negative for the familial pathogenic variant or whether the familial pathogenic variant was unable to be detected by the genetic testing method used. In such cases, screening and follow-up should be guided by personal and family history.     Because Ms. Benitez was diagnosed with endometrial carcinosarcoma at age 63y with a family history of colorectal cancer in her daughter at age 42y, genetic testing for pathogenic variants in high-penetrance cancer susceptibility genes is indicated based on the NCCN guidelines (Greer syndrome V.3.2024).      Ms. Benitez appeared to understand the information presented. On the day of the visit Ms. Benitez elected to proceed with genetic testing for hereditary cancer syndromes. My office will call Ms. Benitez as soon as results are received; post-test  counseling can be scheduled at that time. Thank you for allowing me to participate in the care of your patient; please do not hesitate to contact my office if you have any questions or concerns, [phone].    Plan:  Blood was drawn and sent out for [facility] common hereditary cancers + RNA panel (TAT: 2-3 weeks, 70 genes).    The Genetics office will call Ms. Benitez when results are available.  Recommendations for Ms. Benitez and family members will depend the above genetic testing results.      Send to: [facility]  Time spent with patient: 30 minutes      Cheyanne Khan MS, [facility]

## 2024-11-21 ENCOUNTER — GENETICS ENCOUNTER (OUTPATIENT)
Dept: HEMATOLOGY/ONCOLOGY | Facility: HOSPITAL | Age: 64
End: 2024-11-21

## 2024-11-21 NOTE — PROGRESS NOTES
Patient Name: Rosario Benitez  YOB: 1960    Referring Provider:  Matthew Buckley MD      Reason for Referral:  Ms. Benitez had genetic testing performed on 11/13/2024 because of a diagnosis of endometrial carcinosarcoma at age 63y and a family history of cancer.      Genetic Testing Result:  Negative. No pathogenic variant was found in the following 70 genes on the [employer] multi-cancer + RNA panel: AIP, ALK, APC, APOLLO, AXIN2, BAP1, BARD1, BLM, BMPR1A, BRCA1, BRCA2, BRIP1, CDC73, CDH1, CDK4, CDKN1B, CDKN2A, CHEK2, CTNNA1, DICER1, EGFR, EPCAM, FH, FLCN, GREM1, HOXB13, KIT, LZTR1, MAX, MBD4, MEN1, MET, MITF, MLH1, MSH2, MSH3, MSH6, MUTYH, NF1, NF2, NTHL1, PALB2, PDGFRA, PMS2, POLD1, POLE, POT1, DYBYE1F, PTCH1, PTEN, RAD51C, RAD51D, RB1, RET, SDHA, SDHAF2, SDHB, SDHC, SDHD, SMAD4, SMARCA4, SMARCB1, SMARCE1, STK11, SUFU, AXSX428, TP53, TSC1, TSC2, VHL. Please refer to the report from [employer] for additional testing information. These results were discussed with Ms. Benitez via telephone on 11/21/2024.     Summary and Plan:   These results indicate that Ms. Benitez was not found to have a pathogenic variant (harmful genetic mutation) in any of the genes listed above. No pathogenic variants associated with hereditary cancer syndromes were identified. The etiology Ms. Benitez's personal and family history of cancer remains genetically unexplained. The limitations of the testing were discussed with Ms. Benitez including the chance that a pathogenic variant in a gene other than those included in this analysis might be the cause of cancer in Ms. Benitez or in relatives.     Medical management and surveillance for Ms. Benitez and other family members should be based on their personal and family history. All medical management decisions should be made with a physician.     I encouraged Ms. Benitez to share the genetic test results with her children and other relatives so that they may discuss the  implications of this information with their health providers. Ms. Benitez is also encouraged to contact me on an annual basis to learn if there have been any updates in genetic information that would apply or if there are changes in the personal and/or family history. Please do not hesitate to contact my office if you have any questions or concerns, [phone].     Cheyanne Khan MS, CGC

## 2024-11-25 ENCOUNTER — OFFICE VISIT (OUTPATIENT)
Dept: HEMATOLOGY/ONCOLOGY | Age: 64
End: 2024-11-25
Attending: INTERNAL MEDICINE
Payer: MEDICARE

## 2024-11-25 VITALS
BODY MASS INDEX: 28.67 KG/M2 | HEART RATE: 78 BPM | RESPIRATION RATE: 18 BRPM | OXYGEN SATURATION: 98 % | DIASTOLIC BLOOD PRESSURE: 82 MMHG | SYSTOLIC BLOOD PRESSURE: 160 MMHG | HEIGHT: 62.68 IN | WEIGHT: 159.81 LBS | TEMPERATURE: 97 F

## 2024-11-25 DIAGNOSIS — D49.59 UTERINE NEOPLASM: Primary | ICD-10-CM

## 2024-11-25 DIAGNOSIS — Z51.11 ENCOUNTER FOR CHEMOTHERAPY MANAGEMENT: ICD-10-CM

## 2024-11-25 DIAGNOSIS — E11.40 TYPE 2 DIABETES MELLITUS WITH DIABETIC NEUROPATHY, WITHOUT LONG-TERM CURRENT USE OF INSULIN (HCC): ICD-10-CM

## 2024-11-25 LAB
ALBUMIN SERPL-MCNC: 4.7 G/DL (ref 3.2–4.8)
ALBUMIN/GLOB SERPL: 1.8 {RATIO} (ref 1–2)
ALP LIVER SERPL-CCNC: 105 U/L
ALT SERPL-CCNC: 18 U/L
ANION GAP SERPL CALC-SCNC: 7 MMOL/L (ref 0–18)
AST SERPL-CCNC: 19 U/L (ref ?–34)
BASOPHILS # BLD AUTO: 0.02 X10(3) UL (ref 0–0.2)
BASOPHILS NFR BLD AUTO: 0.4 %
BILIRUB SERPL-MCNC: 0.5 MG/DL (ref 0.2–1.1)
BUN BLD-MCNC: 15 MG/DL (ref 9–23)
CALCIUM BLD-MCNC: 9.7 MG/DL (ref 8.7–10.4)
CHLORIDE SERPL-SCNC: 105 MMOL/L (ref 98–112)
CO2 SERPL-SCNC: 24 MMOL/L (ref 21–32)
CREAT BLD-MCNC: 0.77 MG/DL
EGFRCR SERPLBLD CKD-EPI 2021: 85 ML/MIN/1.73M2 (ref 60–?)
EOSINOPHIL # BLD AUTO: 0.04 X10(3) UL (ref 0–0.7)
EOSINOPHIL NFR BLD AUTO: 0.8 %
ERYTHROCYTE [DISTWIDTH] IN BLOOD BY AUTOMATED COUNT: 15.9 %
FASTING STATUS PATIENT QL REPORTED: NO
GLOBULIN PLAS-MCNC: 2.6 G/DL (ref 2–3.5)
GLUCOSE BLD-MCNC: 187 MG/DL (ref 70–99)
HCT VFR BLD AUTO: 35.3 %
HGB BLD-MCNC: 12.2 G/DL
IMM GRANULOCYTES # BLD AUTO: 0.04 X10(3) UL (ref 0–1)
IMM GRANULOCYTES NFR BLD: 0.8 %
LYMPHOCYTES # BLD AUTO: 1.67 X10(3) UL (ref 1–4)
LYMPHOCYTES NFR BLD AUTO: 33.9 %
MCH RBC QN AUTO: 30.9 PG (ref 26–34)
MCHC RBC AUTO-ENTMCNC: 34.6 G/DL (ref 31–37)
MCV RBC AUTO: 89.4 FL
MONOCYTES # BLD AUTO: 0.49 X10(3) UL (ref 0.1–1)
MONOCYTES NFR BLD AUTO: 9.9 %
NEUTROPHILS # BLD AUTO: 2.67 X10 (3) UL (ref 1.5–7.7)
NEUTROPHILS # BLD AUTO: 2.67 X10(3) UL (ref 1.5–7.7)
NEUTROPHILS NFR BLD AUTO: 54.2 %
OSMOLALITY SERPL CALC.SUM OF ELEC: 288 MOSM/KG (ref 275–295)
PLATELET # BLD AUTO: 159 10(3)UL (ref 150–450)
POTASSIUM SERPL-SCNC: 4.2 MMOL/L (ref 3.5–5.1)
PROT SERPL-MCNC: 7.3 G/DL (ref 5.7–8.2)
RBC # BLD AUTO: 3.95 X10(6)UL
SODIUM SERPL-SCNC: 136 MMOL/L (ref 136–145)
WBC # BLD AUTO: 4.9 X10(3) UL (ref 4–11)

## 2024-11-25 PROCEDURE — G2211 COMPLEX E/M VISIT ADD ON: HCPCS | Performed by: NURSE PRACTITIONER

## 2024-11-25 PROCEDURE — S0028 INJECTION, FAMOTIDINE, 20 MG: HCPCS | Performed by: NURSE PRACTITIONER

## 2024-11-25 PROCEDURE — 96417 CHEMO IV INFUS EACH ADDL SEQ: CPT

## 2024-11-25 PROCEDURE — 96415 CHEMO IV INFUSION ADDL HR: CPT

## 2024-11-25 PROCEDURE — 96375 TX/PRO/DX INJ NEW DRUG ADDON: CPT

## 2024-11-25 PROCEDURE — 99215 OFFICE O/P EST HI 40 MIN: CPT | Performed by: NURSE PRACTITIONER

## 2024-11-25 PROCEDURE — 96413 CHEMO IV INFUSION 1 HR: CPT

## 2024-11-25 RX ORDER — FAMOTIDINE 10 MG/ML
20 INJECTION, SOLUTION INTRAVENOUS ONCE
Status: COMPLETED | OUTPATIENT
Start: 2024-11-25 | End: 2024-11-25

## 2024-11-25 RX ORDER — DIPHENHYDRAMINE HYDROCHLORIDE 50 MG/ML
50 INJECTION INTRAMUSCULAR; INTRAVENOUS ONCE
Status: CANCELLED | OUTPATIENT
Start: 2024-11-25

## 2024-11-25 RX ORDER — FAMOTIDINE 10 MG/ML
20 INJECTION, SOLUTION INTRAVENOUS ONCE
Status: CANCELLED | OUTPATIENT
Start: 2024-11-25

## 2024-11-25 RX ORDER — DIPHENHYDRAMINE HYDROCHLORIDE 50 MG/ML
50 INJECTION INTRAMUSCULAR; INTRAVENOUS ONCE
Status: COMPLETED | OUTPATIENT
Start: 2024-11-25 | End: 2024-11-25

## 2024-11-25 RX ADMIN — FAMOTIDINE 20 MG: 10 INJECTION, SOLUTION INTRAVENOUS at 10:11:00

## 2024-11-25 RX ADMIN — DIPHENHYDRAMINE HYDROCHLORIDE 50 MG: 50 INJECTION INTRAMUSCULAR; INTRAVENOUS at 10:14:00

## 2024-11-25 NOTE — PROGRESS NOTES
Pt here for C5D1 Carbo/Taxol. Neuropathy unchanged. No n/v/d. Appetite and energy levels are good. Pt states feeling great overall.         Education Record    Learner:  Patient and Family Member    Disease / Diagnosis: uterine neoplasm    Barriers / Limitations:  None   Comments:    Method:  Discussion   Comments:    General Topics:  Medication, Side effects and symptom management, and Plan of care reviewed   Comments:    Outcome:  Observed demonstration and Shows understanding   Comments:

## 2024-11-25 NOTE — PROGRESS NOTES
Pt here for C5D1 Drug(s)TC.  Arrives Ambulating independently, accompanied by self /family    Patient was evaluated today by Treatment RN    Oral medications included in this regimen: see MAR    Patient confirms comprehension of cancer treatment schedule: yes    Pregnancy screening: denies pregnancy    Modifications in dose or schedule: no    Medications appearance and physical integrity checked by RN: yes    Chemotherapy IV pump settings verified by 2 RNs: yes  Frequency of blood return and site check throughout administration: prior to administration and every 2-3 ml if ivp    Infusion/treatment outcome: pt  tolerated treatment with no c/o.     Education Record    Learner: patient  Barriers / Limitations:none  Method: verbal  Education / instructions given:  chemo administration  Outcome: pt verbalized understanding    Discharged home    Patient/family verbalized understanding of future appointments: yes

## 2024-12-13 NOTE — PROGRESS NOTES
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 Chavarria 7/19/24  Adjuvant Carboplatin + Paclitaxel:     Visit Diagnosis:  1. Uterine neoplasm      History of Present Illness: 64F with a PMH of DM2, HTN and CAD was referred by Dr. Chavarria    -She presented to the ER in July 2024 with pelvic pain and vaginal spotting. CT CAP 7/19/24-endometrial mass extending to vaginal canal (8.6 x 7.2 x 11.2 cm).  25.   -7/19/24-endometrial mass excision with Dr. Chavarria necrotic carcinosarcoma  -7/19/24-radical abdominal hysterectomy with BSO and partial vaginectomy, pelvic LAD with Dr. Chavarria. 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  -Reviewed in tumor board-recommended adjuvant carbo/taxol followed by RT  -FH of malignancy as below    -Adjuvant Carboplatin + Paclitaxel   -C1: 8/22/24   -C2: 9/16/24   -C3: 10/14/24   -C4: 11/4/24   -C5: 11/25/24   -C6: 12/16/24    Interval History  -c6 today  -CBC and CMP stable   -needs to see rad onc  -Doing great without any side effects  -No vaginal bleeding     Review of Systems: 12 Point ROS was completed and pertinent positives are in the HPI    Current Outpatient Medications on File Prior to Visit   Medication Sig Dispense Refill    ondansetron 8 MG Oral Tablet Dispersible Take 1 tablet (8 mg total) by mouth every 8 (eight) hours as needed for Nausea. 30 tablet 0    prochlorperazine (COMPAZINE) 10 mg tablet Take 1 tablet (10 mg total) by mouth every 6 (six) hours as needed for Nausea. 30 tablet 3    carvedilol 6.25 MG Oral Tab Take 1 tablet (6.25 mg total) by mouth 2 (two) times daily.      pravastatin 10 MG Oral Tab Take 1 tablet (10 mg total) by mouth every evening.      metFORMIN 500 MG Oral Tab Take 1 tablet (500 mg total) by mouth 2 (two) times daily with meals.      losartan 25 MG Oral  Tab Take 1 tablet (25 mg total) by mouth daily.      aspirin 81 MG Oral Chew Tab Chew 1 tablet (81 mg total) by mouth daily. (Patient not taking: Reported on 11/4/2024)       Current Facility-Administered Medications on File Prior to Visit   Medication Dose Route Frequency Provider Last Rate Last Admin    [COMPLETED] ondansetron (Zofran) 16 mg, dexAMETHasone (Decadron) 20 mg in sodium chloride 0.9% 110 mL IVPB   Intravenous Once Lucille Sevilla APRN   Stopped at 11/25/24 1033    [COMPLETED] diphenhydrAMINE (Benadryl) 50 mg/mL  injection 50 mg  50 mg Intravenous Once Lucille Sevilla APRN   50 mg at 11/25/24 1014    [COMPLETED] famotidine (Pepcid) 20 mg/2mL injection 20 mg  20 mg Intravenous Once Lucille Sevilla APRN   20 mg at 11/25/24 1011    [COMPLETED] PACLitaxel (Taxol) 300 mg in sodium chloride 0.9% 550 mL infusion  175 mg/m2 (Treatment Plan Recorded) Intravenous Once Lucille Sevilla APRN   Stopped at 11/25/24 1405    [COMPLETED] CARBOplatin (Paraplatin) 620 mg in sodium chloride 0.9% 312 mL infusion (by AUC)  620 mg Intravenous Once Lucille Sevilla APRN   Stopped at 11/25/24 1438     Past Medical History:    Diabetes (HCC)    Essential hypertension    Heart attack (HCC)     Past Surgical History:   Procedure Laterality Date    Anesth,radical leg bone surgery       Social History     Socioeconomic History    Marital status: Single   Tobacco Use    Smoking status: Former     Types: Cigarettes     Passive exposure: Never    Smokeless tobacco: Never    Tobacco comments:     QUIT 2007   Vaping Use    Vaping status: Never Used   Substance and Sexual Activity    Alcohol use: Yes     Comment: social    Drug use: Never      Family History   Problem Relation Age of Onset    Cancer Father 80        lung cancer    Cancer Sister 68        lung cancer    Thyroid Cancer Sister 45 - 59    Colon Cancer Daughter 42        s/p surgery    Other (multiple osteochondromas) Daughter     Thyroid Cancer Daughter 26        s/p  surgery    Other (multiple osteochondromas) Daughter     Other (multiple osteochondromas) Grandson     Cancer Niece 40        Lymphoma    Cancer Niece 23        NHL       Physical Exam  Height: 159.2 cm (5' 2.68\") (12/16 0908)  Weight: 73.5 kg (162 lb) (12/16 0908)  BSA (Calculated - sq m): 1.76 sq meters (12/16 0908)  Pulse: 85 (12/16 0908)  BP: 150/78 (12/16 0908)  Temp: 96.8 °F (36 °C) (12/16 0908)  Do Not Use - Resp Rate: --  SpO2: 99 % (12/16 0908)    General: NAD, AOX3  HEENT: clear op, mmm, no jvd, no scleral icterus  CV: RRR S1S2 no murmurs  Extremities: No edema   Lungs: CTAB, no increased work of breathing  Abd: soft nt nd +BS no hepatosplenomegaly  Neuro: CN: II-XII grossly intact      Results:  Lab Results   Component Value Date    WBC 4.2 12/16/2024    HGB 10.9 (L) 12/16/2024    HCT 32.9 (L) 12/16/2024    MCV 95.9 12/16/2024    .0 12/16/2024     Lab Results   Component Value Date     12/16/2024    K 4.4 12/16/2024    CO2 23.0 12/16/2024     12/16/2024    BUN 11 12/16/2024    PHOS 3.2 07/21/2024    ALB 4.3 12/16/2024       Final Diagnosis:   A.  Sigmoid epiploica:  -Fibroadipose tissue with fat necrosis associated with calcification.  -Negative for malignancy.     B.  Vaginal canal tumor:  -Necrotic carcinosarcoma.     C. Cervix, uterus, bilateral fallopian tubes and ovaries:  -Carcinosarcoma of the endometrium.  -Tumor invades the outer half of the myometrium (1.8 cm of a 2.1 cm thick myometrium).  -Tumor extends into the cervical stroma.  -Tumor measures 6 cm in greatest dimension.   -Background endometrial polyp.  -No definite lymphatic/vascular invasion.  -Bilateral parametria, negative for tumor.  -Bilateral ovaries with corpus albicantia.  -Bilateral unremarkable fallopian tubes.     D.  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 Chavarria 7/19/24. We discussed that she has a higher risk of recurrence and adjuvant Carbo/taxol x 6 cycles followed by adjuvant RT is recommended. She has mild neuropathy at baseline that will need to be monitored.   -C6 Carbo/Taxol today   -Repeat CT CAP after chemotherapy ordered   -Will see rad onc    FH of Cancer; referred to genetics-negative testing     [facility]

## 2024-12-16 ENCOUNTER — OFFICE VISIT (OUTPATIENT)
Age: 64
End: 2024-12-16
Attending: INTERNAL MEDICINE
Payer: MEDICARE

## 2024-12-16 VITALS
BODY MASS INDEX: 29.07 KG/M2 | WEIGHT: 162 LBS | HEIGHT: 62.68 IN | HEART RATE: 85 BPM | OXYGEN SATURATION: 99 % | DIASTOLIC BLOOD PRESSURE: 78 MMHG | TEMPERATURE: 97 F | SYSTOLIC BLOOD PRESSURE: 150 MMHG | RESPIRATION RATE: 18 BRPM

## 2024-12-16 DIAGNOSIS — D49.59 UTERINE NEOPLASM: Primary | ICD-10-CM

## 2024-12-16 LAB
ALBUMIN SERPL-MCNC: 4.3 G/DL (ref 3.2–4.8)
ALBUMIN/GLOB SERPL: 1.7 {RATIO} (ref 1–2)
ALP LIVER SERPL-CCNC: 100 U/L
ALT SERPL-CCNC: 16 U/L
ANION GAP SERPL CALC-SCNC: 6 MMOL/L (ref 0–18)
AST SERPL-CCNC: 18 U/L (ref ?–34)
BASOPHILS # BLD AUTO: 0.02 X10(3) UL (ref 0–0.2)
BASOPHILS NFR BLD AUTO: 0.5 %
BILIRUB SERPL-MCNC: 0.6 MG/DL (ref 0.2–1.1)
BUN BLD-MCNC: 11 MG/DL (ref 9–23)
CALCIUM BLD-MCNC: 9.4 MG/DL (ref 8.7–10.4)
CHLORIDE SERPL-SCNC: 109 MMOL/L (ref 98–112)
CO2 SERPL-SCNC: 23 MMOL/L (ref 21–32)
CREAT BLD-MCNC: 0.78 MG/DL
EGFRCR SERPLBLD CKD-EPI 2021: 84 ML/MIN/1.73M2 (ref 60–?)
EOSINOPHIL # BLD AUTO: 0.03 X10(3) UL (ref 0–0.7)
EOSINOPHIL NFR BLD AUTO: 0.7 %
ERYTHROCYTE [DISTWIDTH] IN BLOOD BY AUTOMATED COUNT: 15.9 %
GLOBULIN PLAS-MCNC: 2.6 G/DL (ref 2–3.5)
GLUCOSE BLD-MCNC: 178 MG/DL (ref 70–99)
HCT VFR BLD AUTO: 32.9 %
HGB BLD-MCNC: 10.9 G/DL
IMM GRANULOCYTES # BLD AUTO: 0.02 X10(3) UL (ref 0–1)
IMM GRANULOCYTES NFR BLD: 0.5 %
LYMPHOCYTES # BLD AUTO: 1.61 X10(3) UL (ref 1–4)
LYMPHOCYTES NFR BLD AUTO: 38.7 %
MCH RBC QN AUTO: 31.8 PG (ref 26–34)
MCHC RBC AUTO-ENTMCNC: 33.1 G/DL (ref 31–37)
MCV RBC AUTO: 95.9 FL
MONOCYTES # BLD AUTO: 0.49 X10(3) UL (ref 0.1–1)
MONOCYTES NFR BLD AUTO: 11.8 %
NEUTROPHILS # BLD AUTO: 1.99 X10 (3) UL (ref 1.5–7.7)
NEUTROPHILS # BLD AUTO: 1.99 X10(3) UL (ref 1.5–7.7)
NEUTROPHILS NFR BLD AUTO: 47.8 %
OSMOLALITY SERPL CALC.SUM OF ELEC: 290 MOSM/KG (ref 275–295)
PLATELET # BLD AUTO: 168 10(3)UL (ref 150–450)
PLATELETS.RETICULATED NFR BLD AUTO: 1.3 % (ref 0–7)
POTASSIUM SERPL-SCNC: 4.4 MMOL/L (ref 3.5–5.1)
PROT SERPL-MCNC: 6.9 G/DL (ref 5.7–8.2)
RBC # BLD AUTO: 3.43 X10(6)UL
SODIUM SERPL-SCNC: 138 MMOL/L (ref 136–145)
WBC # BLD AUTO: 4.2 X10(3) UL (ref 4–11)

## 2024-12-16 RX ORDER — FAMOTIDINE 10 MG/ML
20 INJECTION, SOLUTION INTRAVENOUS ONCE
Status: CANCELLED | OUTPATIENT
Start: 2024-12-16

## 2024-12-16 RX ORDER — DIPHENHYDRAMINE HYDROCHLORIDE 50 MG/ML
50 INJECTION INTRAMUSCULAR; INTRAVENOUS ONCE
Status: COMPLETED | OUTPATIENT
Start: 2024-12-16 | End: 2024-12-16

## 2024-12-16 RX ORDER — FAMOTIDINE 10 MG/ML
20 INJECTION, SOLUTION INTRAVENOUS ONCE
Status: COMPLETED | OUTPATIENT
Start: 2024-12-16 | End: 2024-12-16

## 2024-12-16 RX ORDER — DIPHENHYDRAMINE HYDROCHLORIDE 50 MG/ML
50 INJECTION INTRAMUSCULAR; INTRAVENOUS ONCE
Status: CANCELLED | OUTPATIENT
Start: 2024-12-16

## 2024-12-16 RX ADMIN — FAMOTIDINE 20 MG: 10 INJECTION, SOLUTION INTRAVENOUS at 10:47:00

## 2024-12-16 RX ADMIN — DIPHENHYDRAMINE HYDROCHLORIDE 50 MG: 50 INJECTION INTRAMUSCULAR; INTRAVENOUS at 10:45:00

## 2024-12-16 NOTE — PROGRESS NOTES
Patient here for follow-up and planned C6D1 treatment. States neuropathy is unchanged. Denies any updates or changes since last visit. States she feels well overall.

## 2024-12-16 NOTE — PROGRESS NOTES
Pt here for C6 D1 Drug(s): Taxol/Carboplatin.  Arrives Ambulating independently, accompanied by Family member     Patient was evaluated today by MD and Treatment Nurse.    Oral medications included in this regimen:  no    Patient confirms comprehension of cancer treatment schedule:  yes    Pregnancy screening:  Denies possibility of pregnancy    Modifications in dose or schedule:  No    Medications appearance and physical integrity checked by RN: yes.    Chemotherapy IV pump settings verified by 2 RNs:  Yes.  Frequency of blood return and site check throughout administration: Prior to administration, Prior to each drug, and At completion of therapy     Infusion/treatment outcome:  patient tolerated treatment without incident    Education Record    Learner:  Patient and Family Member  Barriers / Limitations:  None  Method:  Discussion  Education / instructions given:  Reviewed plan of care and follow up appts.  Outcome:  Shows understanding    Discharged Home, Ambulating independently, accompanied by:Family member    Patient/family verbalized understanding of future appointments: by Plynked messaging

## 2024-12-26 NOTE — PROGRESS NOTES
Nursing Consultation Note  Patient: Rosario Benitez  YOB: 1960  Age: 64 year old  Radiation Oncologist: Dr. Victoria Hdz  Referring Physician: Dr. Aura Angela  Diagnosis: ENDOMETRIAL CANCER  Consult Date: 12/26/2024      Chemotherapy: s/p 6 cycles of Carbo/Taxol on 12/16/24    Labs: Reviewed  Imaging: Reviewed  Is the patient of child-bearing age?         No  Menarche: 11 y/o  Menopause: early 50's  No OCP or HRT use    Has the patient received radiation therapy in the past? no  Does the patient have an implantable device?No   Patient has/has had:     1. Assistive Devices: N/A    2. Flu Vaccination: no-referral to ask PCP    3. Pneumonia Vaccination:  no--referral to ask PCP    Vital Signs:   Vitals:    12/27/24 0812   BP: 139/82   Pulse: 82   Resp: 16   Temp: (!) 96.4 °F (35.8 °C)   ,   Wt Readings from Last 6 Encounters:   12/27/24 74 kg (163 lb 3.2 oz)   12/16/24 73.5 kg (162 lb)   11/25/24 72.5 kg (159 lb 12.8 oz)   11/04/24 71.7 kg (158 lb)   10/10/24 68.9 kg (152 lb)   09/16/24 68.7 kg (151 lb 8 oz)       Nursing Note: Diagnosed with endometrial sarcoma, pt presented in July 2024 with vaginal spotting and pelvic pain. Work-up showed endometrial mass. Met with Dr. Chavarria, recommended RATAH-BSO with biopsy of mass. Done on 7/20/24, path showed carcinosarcoma of endometrium, tumor extending to myometrium; vaginal canal tumor +for necrotic carcinosarcoma, has 0/5 +bilat LN. Met with Dr. Buckley, recommended adjuvant chemo followed by RT. Started in Aug, completed 6 cycle of Carbo/Taxol on 12/16/24. Referred for consult. Pt  here alone, AOx4. Feels well, appetite good. Denies pelvic pains, no vaginal discharge or bleeding. Denies dysuria, hematuria or incontinence. BM regular.         Review of Systems   Constitutional: Negative.    HENT: Negative.     Eyes: Negative.    Respiratory: Negative.     Cardiovascular: Negative.    Gastrointestinal: Negative.    Endocrine: Negative.     Genitourinary: Negative.    Musculoskeletal: Negative.    Skin: Negative.    Allergic/Immunologic: Negative.    Neurological: Negative.    Hematological: Negative.    Psychiatric/Behavioral: Negative.            Allergies:  Allergies[1]    Current Outpatient Medications   Medication Sig Dispense Refill    carvedilol 6.25 MG Oral Tab Take 1 tablet (6.25 mg total) by mouth 2 (two) times daily.      pravastatin 10 MG Oral Tab Take 1 tablet (10 mg total) by mouth every evening.      metFORMIN 500 MG Oral Tab Take 1 tablet (500 mg total) by mouth 2 (two) times daily with meals.      losartan 25 MG Oral Tab Take 1 tablet (25 mg total) by mouth daily.      ondansetron 8 MG Oral Tablet Dispersible Take 1 tablet (8 mg total) by mouth every 8 (eight) hours as needed for Nausea. (Patient not taking: Reported on 12/27/2024) 30 tablet 0    prochlorperazine (COMPAZINE) 10 mg tablet Take 1 tablet (10 mg total) by mouth every 6 (six) hours as needed for Nausea. (Patient not taking: Reported on 12/27/2024) 30 tablet 3    aspirin 81 MG Oral Chew Tab Chew 1 tablet (81 mg total) by mouth daily. (Patient not taking: Reported on 12/27/2024)         Preferred Pharmacy:    [id number] - [place], [place] - [address] [phone], [phone]  [facility] [address] [place] [address]  Phone: [phone] Fax: [phone]    [place], [place] - [address] [place] [address] [phone], [phone]  [address] [place] [address]  Phone: [phone] Fax: [phone]      Past Medical History:    Diabetes (HCC)    Essential hypertension    Heart attack (HCC)    states has minimal residual left-sided weakness    Uterine cancer (HCC)       Past Surgical History:   Procedure Laterality Date    Anesth,radical leg bone surgery Right     broken ankle    Hysterectomy         Social History     Socioeconomic History    Marital status: Single     Spouse name: Not on file    Number of children: 3    Years of  education: Not on file    Highest education level: Not on file   Occupational History    Occupation: not employed at the moment   Tobacco Use    Smoking status: Former     Types: Cigarettes     Passive exposure: Never    Smokeless tobacco: Never    Tobacco comments:     QUIT 2007   Vaping Use    Vaping status: Never Used   Substance and Sexual Activity    Alcohol use: Yes     Comment: social    Drug use: Never    Sexual activity: Not Currently     Birth control/protection: Hysterectomy   Other Topics Concern    Not on file   Social History Narrative    Single, has 3 daughters    Lives with 1 of her daughters     Social Drivers of Health     Financial Resource Strain: Not on file   Food Insecurity: No Food Insecurity (7/18/2024)    Food Insecurity     Food Insecurity: Never true   Transportation Needs: No Transportation Needs (7/18/2024)    Transportation Needs     Lack of Transportation: No     Car Seat: Not on file   Physical Activity: Not on file   Stress: Not on file   Social Connections: Not on file   Housing Stability: Low Risk  (7/18/2024)    Housing Stability     Housing Instability: No     Housing Instability Emergency: Not on file     Crib or Bassinette: Not on file       ECOG:  Grade 0 - Fully active, able to carry on all predisease activities without restrictions.      Education: YES  Knowledge Deficit Plan Of Care:    Problem:  Knowledge Deficit    Problems related to:    Radiation therapy    Interventions:  Instruct on purpose of radiation therapy    Expected Outcomes:  Knowledge of radiation therapy    Progress Toward Outcome:  Making progress    Pamphlets/Handouts Given to Patient:  Understanding radiation therapy      Are ADL's met?  Yes  Does patient feel safe in their environment?  Yes  Care decisions:  Patient and/or surrogate IS involved in care decisions.  Advanced directives:  Patient HAS advnaced directives and a copy has been requested.  Transportation:  Adequate transportation available for  expected visits    Pain:   ;Pain Score: 0   ;    ;          [1] No Known Allergies

## 2024-12-26 NOTE — CONSULTS
[facility]  Radiation Oncology New Consultation      Patient Name: Rosario Benitez   MRN: [id number]  PCP: Umberto Enciso MD  Referring Physician: Darwin Buckley MD; Tex Chavarria MD    Diagnosis: carcinosarcoma of the endometrium, pT2 N0 M0, stage II, with outer MMI, microsatellite stable    Reason for Consultation: discussion of adjuvant RT    HPI: The patient is a 64 year old female who was seen today for consultation regarding the above diagnosis.     July 2024: presented to ER with pelvic pain and vaginal spotting. CT CAP with endometrial mass extending into the vaginal canal measuring up to 11.2 cm. Excision of the mass that day showed necrotic carcinosarcoma.    7/20/24: total hysterectomy, upper vaginectomy, and pelvic LNB.  -carcinosarcoma of the endometrium measuring 6cm, tumor invades 1.8/2.1 cm  -extension into the cervical stroma  -no LVSI  -parametria, fallopian tubes, ovaries negative  -vaginal canal \"loose tumor\" with necrotic carcinosarcoma  -5 LN negative  -STEPHANIA    Completed 6 cycles of adjuvant carbo/paclitaxel on 12/16/24.  Repeat imaging is ordered, scheduled for 12/31.    Feels well, had pretty good energy and appetite through chemo.  No pelvic pain or discharge, no GI or  changes.  No lymphedema.    Past Medical History  Past Medical History:    Diabetes (HCC)    Essential hypertension    Heart attack (HCC)    states has minimal residual left-sided weakness    Uterine cancer (HCC)       Past Surgical History  Past Surgical History:   Procedure Laterality Date    Anesth,radical leg bone surgery Right     broken ankle    Hysterectomy         Medications  Current Outpatient Medications   Medication Sig Dispense Refill    carvedilol 6.25 MG Oral Tab Take 1 tablet (6.25 mg total) by mouth 2 (two) times daily.      pravastatin 10 MG Oral Tab Take 1 tablet (10 mg total) by mouth every evening.      metFORMIN 500 MG Oral Tab Take 1 tablet (500 mg total) by mouth 2 (two) times daily with  meals.      losartan 25 MG Oral Tab Take 1 tablet (25 mg total) by mouth daily.      ondansetron 8 MG Oral Tablet Dispersible Take 1 tablet (8 mg total) by mouth every 8 (eight) hours as needed for Nausea. (Patient not taking: Reported on 12/27/2024) 30 tablet 0    prochlorperazine (COMPAZINE) 10 mg tablet Take 1 tablet (10 mg total) by mouth every 6 (six) hours as needed for Nausea. (Patient not taking: Reported on 12/27/2024) 30 tablet 3    aspirin 81 MG Oral Chew Tab Chew 1 tablet (81 mg total) by mouth daily. (Patient not taking: Reported on 12/27/2024)         Allergies  Allergies[1]    OB/GYN History  -G*** P***  -Age of first birth: ***  -Breast feeding: ***  -Age of menarche: ***  -Age of menopause: ***  -OCP use: ***  -HRT use: ***  -Fertility treatments: ***    Social History  -Employment: off work right now  -Living situation: single, lives in [place]  -Tobacco: ***  -Alcohol: ***  -Drugs: ***    Family History  Family History   Problem Relation Age of Onset    Cancer Father 80        lung cancer    Colon Cancer Daughter 42        s/p surgery    Other (multiple osteochondromas) Daughter     Thyroid Cancer Daughter 26        s/p surgery    Other (multiple osteochondromas) Daughter     Cancer Sister 68        lung cancer    Thyroid Cancer Sister 45 - 59    Other (multiple osteochondromas) Grandson     Cancer Niece 40        Lymphoma    Cancer Niece 23        NHL       Cancer Screening  -Colonoscopy: ***    Review of Systems  Otherwise negative except as noted in HPI.     No prior history of radiation.  No history of lupus, collagen-vascular disease, or inflammatory bowel disease.     Physical Exam  Vitals:    12/27/24 0812   BP: 139/82   Pulse: 82   Resp: 16   Temp: (!) 96.4 °F (35.8 °C)     Temp:  [96.4 °F (35.8 °C)] 96.4 °F (35.8 °C)  Pulse:  [82] 82  Resp:  [16] 16  BP: (139)/(82) 139/82  SpO2:  [98 %] 98 %    ECOG: ***  KPS: ***    Gen: NAD  HEENT: NCAT, EOMI  Neck: no LAD  CV: RRR  Lungs: CTAB  Abd:  soft, NTND, +bs  Ext: wwp, no cyanosis or edema  Neuro: CN II-XII grossly intact    Labs: ***    Imaging: ***    Assessment: ***    Plan:     I discussed the above clinical situation with the patient *** at the time of consultation.     ***    We discussed the potential short-term and long-term side effects of radiotherapy.     All questions were answered, and no guarantee of safety or efficacy was made. Alternatives to radiotherapy were discussed with the patient.    The patient is agreeable to proceed with radiotherapy.   They will return to our department for CT simulation.     Victoria Hdz MD  Radiation Oncology    MDM high including chart review, personal review of imaging, and time spent with the patient in counseling.                  [1] No Known Allergies

## 2024-12-27 ENCOUNTER — HOSPITAL ENCOUNTER (OUTPATIENT)
Dept: RADIATION ONCOLOGY | Facility: HOSPITAL | Age: 64
Discharge: HOME | End: 2024-12-27
Attending: INTERNAL MEDICINE
Payer: MEDICARE

## 2024-12-27 VITALS
BODY MASS INDEX: 30.03 KG/M2 | HEIGHT: 62 IN | DIASTOLIC BLOOD PRESSURE: 82 MMHG | SYSTOLIC BLOOD PRESSURE: 139 MMHG | TEMPERATURE: 96 F | HEART RATE: 82 BPM | RESPIRATION RATE: 16 BRPM | OXYGEN SATURATION: 98 % | WEIGHT: 163.19 LBS

## 2024-12-27 DIAGNOSIS — C54.1 CARCINOSARCOMA OF ENDOMETRIUM (HCC): Primary | ICD-10-CM

## 2024-12-27 PROCEDURE — 99214 OFFICE O/P EST MOD 30 MIN: CPT

## 2024-12-27 NOTE — PATIENT INSTRUCTIONS
- WE WILL CALL TO SCHEDULE YOUR CT SIMULATION FOR RADIATION PLANNING.    - PLEASE FOLLOW FULL BLADDER INSTRUCTIONS FOR YOUR CT SIMULATION AND RADIATION TREATMENTS.     - IF YOU HAVE ANY QUESTIONS OR CONCERNS REGARDING RADIATION THERAPY, PLEASE CALL [phone].

## 2024-12-31 ENCOUNTER — HOSPITAL ENCOUNTER (OUTPATIENT)
Dept: CT IMAGING | Age: 64
Discharge: HOME | End: 2024-12-31
Attending: INTERNAL MEDICINE
Payer: MEDICARE

## 2024-12-31 DIAGNOSIS — D49.59 UTERINE NEOPLASM: ICD-10-CM

## 2024-12-31 PROCEDURE — 71260 CT THORAX DX C+: CPT | Performed by: INTERNAL MEDICINE

## 2024-12-31 PROCEDURE — 74177 CT ABD & PELVIS W/CONTRAST: CPT | Performed by: INTERNAL MEDICINE

## 2025-01-01 DIAGNOSIS — R59.9 LYMPH NODE ENLARGEMENT: Primary | ICD-10-CM

## 2025-01-02 ENCOUNTER — TELEPHONE (OUTPATIENT)
Age: 65
End: 2025-01-02

## 2025-01-02 NOTE — TELEPHONE ENCOUNTER
Spoke with patient. She verbalized understanding.     Matthew Buckley MD  [facility]  Results reviewed. Non specific very small LN in the chest. I am ordering a repeat CT chest in 4 weeks.

## 2025-01-03 ENCOUNTER — HOSPITAL ENCOUNTER (OUTPATIENT)
Dept: RADIATION ONCOLOGY | Facility: HOSPITAL | Age: 65
Discharge: HOME | End: 2025-01-03
Attending: INTERNAL MEDICINE
Payer: MEDICARE

## 2025-01-03 PROCEDURE — 77334 RADIATION TREATMENT AID(S): CPT | Performed by: INTERNAL MEDICINE

## 2025-01-03 PROCEDURE — 77333 RADIATION TREATMENT AID(S): CPT | Performed by: INTERNAL MEDICINE

## 2025-01-08 PROCEDURE — 77301 RADIOTHERAPY DOSE PLAN IMRT: CPT | Performed by: INTERNAL MEDICINE

## 2025-01-08 PROCEDURE — 77338 DESIGN MLC DEVICE FOR IMRT: CPT | Performed by: INTERNAL MEDICINE

## 2025-01-08 PROCEDURE — 77300 RADIATION THERAPY DOSE PLAN: CPT | Performed by: INTERNAL MEDICINE

## 2025-01-20 ENCOUNTER — HOSPITAL ENCOUNTER (OUTPATIENT)
Dept: RADIATION ONCOLOGY | Facility: HOSPITAL | Age: 65
Discharge: HOME | End: 2025-01-20
Attending: INTERNAL MEDICINE
Payer: MEDICARE

## 2025-01-20 PROCEDURE — 77386 HC IMRT COMPLEX: CPT | Performed by: INTERNAL MEDICINE

## 2025-01-21 ENCOUNTER — OFFICE VISIT (OUTPATIENT)
Age: 65
End: 2025-01-21
Attending: INTERNAL MEDICINE
Payer: MEDICARE

## 2025-01-21 PROCEDURE — 77386 HC IMRT COMPLEX: CPT | Performed by: INTERNAL MEDICINE

## 2025-01-21 NOTE — PROGRESS NOTES
Oncology Nutrition F/U Consultation    Patient Name: Rosario Benitez  YOB: 1960  Medical Record Number: [id number]   Account Number: [id number]  Dietitian: Octavia Garcias RD, RACHELN    Date of visit: 1/21/2025    Diet Rx: high protein/quality as tolerated; low residue pending diarrhea    Pertinent Dx/PMH: endometrial cancer     Past Medical History:    Diabetes (HCC)    Essential hypertension    Heart attack (HCC)    states has minimal residual left-sided weakness    Uterine cancer (HCC)       TX: RT thru 2/21; s/p 6C carboplatin + paclitaxel     Pertinent Meds:    Current Outpatient Medications:     ondansetron 8 MG Oral Tablet Dispersible, Take 1 tablet (8 mg total) by mouth every 8 (eight) hours as needed for Nausea. (Patient not taking: Reported on 12/27/2024), Disp: 30 tablet, Rfl: 0    prochlorperazine (COMPAZINE) 10 mg tablet, Take 1 tablet (10 mg total) by mouth every 6 (six) hours as needed for Nausea. (Patient not taking: Reported on 12/27/2024), Disp: 30 tablet, Rfl: 3    carvedilol 6.25 MG Oral Tab, Take 1 tablet (6.25 mg total) by mouth 2 (two) times daily., Disp: , Rfl:     pravastatin 10 MG Oral Tab, Take 1 tablet (10 mg total) by mouth every evening., Disp: , Rfl:     metFORMIN 500 MG Oral Tab, Take 1 tablet (500 mg total) by mouth 2 (two) times daily with meals., Disp: , Rfl:     losartan 25 MG Oral Tab, Take 1 tablet (25 mg total) by mouth daily., Disp: , Rfl:     aspirin 81 MG Oral Chew Tab, Chew 1 tablet (81 mg total) by mouth daily. (Patient not taking: Reported on 12/27/2024), Disp: , Rfl:     Pertinent Labs: noted    Height: 5'2\"            IBW: 110  +/- 10%    WT HX:   Wt Readings from Last 9 Encounters:   12/27/24 74 kg (163 lb 3.2 oz)   12/16/24 73.5 kg (162 lb)   11/25/24 72.5 kg (159 lb 12.8 oz)   11/04/24 71.7 kg (158 lb)   10/10/24 68.9 kg (152 lb)   09/16/24 68.7 kg (151 lb 8 oz)   08/29/24 68.3 kg (150 lb 8 oz)   08/22/24 68.1 kg (150 lb 3.2 oz)   08/14/24 68.7 kg (151  lb 6.4 oz)       Estimated Nutrition Needs: 20-24 kcals/kg = 8825-8691 KCALS/d; 1.3 gms protein/kg = 90 gms/d    Services Provided: Verbal/written ix provided addressing - ongoing importance of nutrition during tx; potential nutrition related side effects of tx and ways to address (low residue pending diarrheal)    Assessment/Plan: RD f/u w/ pt after her RT today. She noted tolerating well although today is only her 2nd tx. Pt noted tolerated upfront chemotherapy well reporting ongoing good appetite and continues to push protein intake.     RD reviewed recommendations pending diarrhea. Pt actively participated verbalizing understanding. RD offered support and will continue to monitor throughout tx.     The 21st Century Cures Act makes medical notes like these available to patients in the interest of transparency. Please be advised this is a medical document. Medical documents are intended to carry relevant information, facts as evident, and the clinical opinion of the practitioner. The medical note is intended as peer to peer communication and may appear blunt or direct. It is written in medical language and may contain abbreviations or verbiage that are unfamiliar.

## 2025-01-22 PROCEDURE — 77386 HC IMRT COMPLEX: CPT | Performed by: INTERNAL MEDICINE

## 2025-01-23 PROCEDURE — 77386 HC IMRT COMPLEX: CPT | Performed by: INTERNAL MEDICINE

## 2025-01-23 NOTE — PROGRESS NOTES
[facility] Radiation Treatment Management Note 1-5    Patient:  Rosario Benitez  Age:  64 year old  Visit Diagnosis:    1. Carcinosarcoma of endometrium (HCC)      Primary Rad/Onc:  Dr. Victoria Hdz    Site Delivered Dose (cGy) Prescribed Dose (cGy) Fraction #   GYN PELVIS 1000 5000 5/25     First treatment date:  1/20/25  Concurrent chemotherapy:         12/16/2024     9:08 AM 12/27/2024     8:12 AM 1/24/2025    11:40 AM   Oncology Vitals   Height 5' 2.677\" 5' 2\"    Height 159 cm 158 cm    Weight 162 lb 163 lb 3.2 oz 161 lb 3.2 oz   Weight 73.483 kg 74.027 kg 73.12 kg   BSA (m2) 1.76 m2 1.75 m2 1.74 m2   BMI 28.99 kg/m2 29.85 kg/m2 29.48 kg/m2   /78 139/82 144/77   Pulse 85 82 79   Resp 18 16 18   Temp 96.8 °F (36 °C) 96.4 °F (35.8 °C) 98.2 °F (36.8 °C)   SpO2 99 % 98 % 97 %   Pain Score 0 0 0        Toxicities:  Fatigue Grade 0= None  Constipation Grade 0= None  Diarrhea  Grade 0= None  Flatulence Grade 0= None  Vaginal bleeding Grade 0= None  Urinary frequency Grade 0= None  Vaginal dryness Grade 0= None  Dysuria on urination Grade 0= None  Urgency on urination Grade 0= None      Nursing Note:  Pt tolerating RT well   No complaints today  No GI or  symptoms  Denies any vaginal bleeding    Carol PINEDA RN    Physician Note:  Subjective:  -new start, no changes      Objective:  Vitals noted  ECOG 0  NAD      Treatment setup imaging have been reviewed:  Yes    Assessment/Plan:  -cont RT per plan    We discussed expected future toxicity. All questions answered.  Next OTV 1 week     Victoria Hdz MD

## 2025-01-24 ENCOUNTER — HOSPITAL ENCOUNTER (OUTPATIENT)
Dept: RADIATION ONCOLOGY | Facility: HOSPITAL | Age: 65
Discharge: HOME | End: 2025-01-24
Attending: INTERNAL MEDICINE
Payer: MEDICARE

## 2025-01-24 VITALS
TEMPERATURE: 98 F | OXYGEN SATURATION: 97 % | SYSTOLIC BLOOD PRESSURE: 144 MMHG | DIASTOLIC BLOOD PRESSURE: 77 MMHG | WEIGHT: 161.19 LBS | RESPIRATION RATE: 18 BRPM | HEART RATE: 79 BPM | BODY MASS INDEX: 29 KG/M2

## 2025-01-24 DIAGNOSIS — C54.1 CARCINOSARCOMA OF ENDOMETRIUM (HCC): Primary | ICD-10-CM

## 2025-01-24 PROCEDURE — 77386 HC IMRT COMPLEX: CPT | Performed by: INTERNAL MEDICINE

## 2025-01-24 PROCEDURE — 77336 RADIATION PHYSICS CONSULT: CPT | Performed by: INTERNAL MEDICINE

## 2025-01-27 PROCEDURE — 77386 HC IMRT COMPLEX: CPT | Performed by: INTERNAL MEDICINE

## 2025-01-28 PROCEDURE — 77386 HC IMRT COMPLEX: CPT | Performed by: INTERNAL MEDICINE

## 2025-01-29 PROCEDURE — 77386 HC IMRT COMPLEX: CPT | Performed by: INTERNAL MEDICINE

## 2025-01-30 PROCEDURE — 77386 HC IMRT COMPLEX: CPT | Performed by: INTERNAL MEDICINE

## 2025-01-31 ENCOUNTER — HOSPITAL ENCOUNTER (OUTPATIENT)
Dept: RADIATION ONCOLOGY | Facility: HOSPITAL | Age: 65
Discharge: HOME | End: 2025-01-31
Attending: INTERNAL MEDICINE
Payer: MEDICARE

## 2025-01-31 VITALS
TEMPERATURE: 98 F | DIASTOLIC BLOOD PRESSURE: 82 MMHG | OXYGEN SATURATION: 100 % | HEART RATE: 76 BPM | BODY MASS INDEX: 29 KG/M2 | SYSTOLIC BLOOD PRESSURE: 151 MMHG | RESPIRATION RATE: 16 BRPM | WEIGHT: 160.81 LBS

## 2025-01-31 DIAGNOSIS — C54.1 CARCINOSARCOMA OF ENDOMETRIUM (HCC): Primary | ICD-10-CM

## 2025-01-31 PROCEDURE — 77336 RADIATION PHYSICS CONSULT: CPT | Performed by: INTERNAL MEDICINE

## 2025-01-31 PROCEDURE — 77386 HC IMRT COMPLEX: CPT | Performed by: INTERNAL MEDICINE

## 2025-01-31 NOTE — PROGRESS NOTES
[facility] Radiation Treatment Management Note 6-10    Patient:  Rosario Benitez  Age:  64 year old  Visit Diagnosis:    1. Carcinosarcoma of endometrium (HCC)      Primary Rad/Onc:  Dr. Victoria Hdz    Site Delivered Dose (cGy) Prescribed Dose (cGy) Fraction #   GYN PELVIS 200 5000 10/25     First treatment date:   1/20/25  Concurrent chemotherapy:  N/A        12/27/2024     8:12 AM 1/24/2025    11:40 AM 1/31/2025    11:16 AM   Oncology Vitals   Height 5' 2\"     Height 158 cm     Weight 163 lb 3.2 oz 161 lb 3.2 oz 160 lb 12.8 oz   Weight 74.027 kg 73.12 kg 72.938 kg   BSA (m2) 1.75 m2 1.74 m2 1.74 m2   BMI 29.85 kg/m2 29.48 kg/m2 29.41 kg/m2   /82 144/77 151/82   Pulse 82 79 76   Resp 16 18 16   Temp 96.4 °F (35.8 °C) 98.2 °F (36.8 °C) 97.8 °F (36.6 °C)   SpO2 98 % 97 % 100 %   Pain Score 0 0 0      Wt Readings from Last 6 Encounters:   01/31/25 72.9 kg (160 lb 12.8 oz)   01/24/25 73.1 kg (161 lb 3.2 oz)   12/27/24 74 kg (163 lb 3.2 oz)   12/16/24 73.5 kg (162 lb)   11/25/24 72.5 kg (159 lb 12.8 oz)   11/04/24 71.7 kg (158 lb)         Toxicities:  Fatigue Grade 0= None  Constipation Grade 0= None  Diarrhea  Grade 0= None  Flatulence Grade 0= None  Vaginal bleeding Grade 0= None  Urinary frequency Grade 0= None  Vaginal dryness Grade 0= None  Dysuria on urination Grade 0= None  Urgency on urination Grade 0= None  Dermatitis associated with radiation Grade 0= None    Nursing Note:  Had episode of loose stools, no diarrhea.  Has Imodium on hand.  Denies rectal irritation.    Tyra PINEDA RN    Physician Note:  Subjective:  -some looser stools, no dysuria      Objective:  Vitals noted  ECOG 0-1  NAD      Treatment setup imaging have been reviewed:  Yes    Assessment/Plan:  -cont RT per plan  -will try 1/2 dose of Imodium    We discussed expected future toxicity. All questions answered.  Next OTV 1 week     Victoria Hdz MD

## 2025-02-01 ENCOUNTER — HOSPITAL ENCOUNTER (OUTPATIENT)
Dept: RADIATION ONCOLOGY | Facility: HOSPITAL | Age: 65
Discharge: HOME | End: 2025-02-01
Attending: INTERNAL MEDICINE
Payer: MEDICARE

## 2025-02-07 ENCOUNTER — APPOINTMENT (OUTPATIENT)
Dept: RADIATION ONCOLOGY | Facility: HOSPITAL | Age: 65
End: 2025-02-07
Attending: INTERNAL MEDICINE
Payer: MEDICARE

## 2025-02-10 PROCEDURE — 77386 HC IMRT COMPLEX: CPT | Performed by: INTERNAL MEDICINE

## 2025-02-11 PROCEDURE — 77386 HC IMRT COMPLEX: CPT | Performed by: INTERNAL MEDICINE

## 2025-02-12 PROCEDURE — 77386 HC IMRT COMPLEX: CPT | Performed by: INTERNAL MEDICINE

## 2025-02-13 PROCEDURE — 77386 HC IMRT COMPLEX: CPT | Performed by: INTERNAL MEDICINE

## 2025-02-13 NOTE — PROGRESS NOTES
[facility] Radiation Treatment Management Note 11-15    Patient:  Rosario Benitez  Age:  64 year old  Visit Diagnosis:    1. Carcinosarcoma of endometrium (HCC)      Primary Rad/Onc:  Dr. Victoria Hdz    Site Delivered Dose (cGy) Prescribed Dose (cGy) Fraction #   GYN PELVIS 3000 5000 15/25     First treatment date:  1/20/25  Concurrent chemotherapy: N/A        1/31/2025    11:16 AM 2/14/2025    11:32 AM 2/14/2025    11:34 AM   Oncology Vitals   Weight 160 lb 12.8 oz 161 lb 3.2 oz    Weight 72.938 kg 73.12 kg    BSA (m2) 1.74 m2 1.74 m2    BMI 29.41 kg/m2 29.48 kg/m2    /82  152/89   Pulse 76  73   Resp 16  18   Temp 97.8 °F (36.6 °C)  97.8 °F (36.6 °C)   SpO2 100 %  100 %   Pain Score 0  0        Toxicities:  Fatigue Grade 0= None  Constipation Grade 0= None  Diarrhea  Grade 1= Increase of <4 stools per day over baseline; mild increase in ostomy output compared to baseline   Flatulence Grade 0= None  Vaginal bleeding Grade 0= None  Urinary frequency Grade 0= None  Vaginal dryness Grade 0= None  Dysuria on urination Grade 0= None  Urgency on urination Grade 0= None        Nursing Note:  Has diarrhea off and on  Using imodium - takes one tab at onset with relief  Denies urinary symptoms  No vaginal bleeding    Carol PINEDA RN    Physician Note:  Subjective:  -no  changes, intermittent diarrhea controlled with Imodium       Objective:  Vitals noted  ECOG 0  NAD      Treatment setup imaging have been reviewed:  Yes    Assessment/Plan:  -cont RT per plan    We discussed expected future toxicity. All questions answered.  Next OTV 1 week     Victoria Hdz MD

## 2025-02-14 ENCOUNTER — HOSPITAL ENCOUNTER (OUTPATIENT)
Dept: RADIATION ONCOLOGY | Facility: HOSPITAL | Age: 65
Discharge: HOME | End: 2025-02-14
Attending: INTERNAL MEDICINE
Payer: MEDICARE

## 2025-02-14 VITALS
SYSTOLIC BLOOD PRESSURE: 152 MMHG | BODY MASS INDEX: 29 KG/M2 | RESPIRATION RATE: 18 BRPM | WEIGHT: 161.19 LBS | OXYGEN SATURATION: 100 % | DIASTOLIC BLOOD PRESSURE: 89 MMHG | TEMPERATURE: 98 F | HEART RATE: 73 BPM

## 2025-02-14 DIAGNOSIS — C54.1 CARCINOSARCOMA OF ENDOMETRIUM (HCC): Primary | ICD-10-CM

## 2025-02-14 PROCEDURE — 77386 HC IMRT COMPLEX: CPT | Performed by: INTERNAL MEDICINE

## 2025-02-14 PROCEDURE — 77336 RADIATION PHYSICS CONSULT: CPT | Performed by: INTERNAL MEDICINE

## 2025-02-17 PROCEDURE — 77386 HC IMRT COMPLEX: CPT | Performed by: INTERNAL MEDICINE

## 2025-02-18 PROCEDURE — 77386 HC IMRT COMPLEX: CPT | Performed by: INTERNAL MEDICINE

## 2025-02-19 PROCEDURE — 77386 HC IMRT COMPLEX: CPT | Performed by: INTERNAL MEDICINE

## 2025-02-20 PROCEDURE — 77386 HC IMRT COMPLEX: CPT | Performed by: INTERNAL MEDICINE

## 2025-02-20 NOTE — PROGRESS NOTES
[facility] Radiation Treatment Management Note 16-20    Patient:  Rosario Benitez  Age:  64 year old  Visit Diagnosis:    1. Carcinosarcoma of endometrium (HCC)      Primary Rad/Onc:  Dr. Victoria Hdz    Site Delivered Dose (cGy) Prescribed Dose (cGy) Fraction #   GYN PELVIS 4000 5000 20/25     First treatment date:   1/20/25  Concurrent chemotherapy:  N/A        2/14/2025    11:32 AM 2/14/2025    11:34 AM 2/21/2025    11:31 AM   Oncology Vitals   Weight 161 lb 3.2 oz  162 lb 3.2 oz   Weight 73.12 kg  73.573 kg   BSA (m2) 1.74 m2  1.75 m2   BMI 29.48 kg/m2  29.67 kg/m2   BP  152/89 136/87   Pulse  73 81   Resp  18 16   Temp  97.8 °F (36.6 °C) 98 °F (36.7 °C)   SpO2  100 % 98 %   Pain Score  0 0        Toxicities:  Fatigue Grade 1= Fatigue relieved by rest  Constipation Grade 0= None  Diarrhea  Grade 1= Increase of <4 stools per day over baseline; mild increase in ostomy output compared to baseline   Flatulence Grade 0= None  Vaginal bleeding Grade 0= None  Urinary frequency Grade 0= None  Vaginal dryness Grade 0= None  Dysuria on urination Grade 0= None  Urgency on urination Grade 0= None    Nursing Note:  Had intermittent episodes of diarrhea x1-2/day.  Denies need for Imodium.  No c/o proctitis or abdominal/pelvic discomfort.     Tyra PINEDA, RN    Physician Note:  Subjective:  -doing well, no dysuria, stable mild intermittent diarrhea, not really needing Imodium       Objective:  Vitals noted  ECOG 0  NAD      Treatment setup imaging have been reviewed:  Yes    Assessment/Plan:  -cont RT per plan  -has follow-up CT chest in early March    We discussed expected future toxicity. All questions answered.  Next OTV 1 week  RTC 1m or sooner if needed     Vcitoria Hdz MD

## 2025-02-21 ENCOUNTER — HOSPITAL ENCOUNTER (OUTPATIENT)
Dept: RADIATION ONCOLOGY | Facility: HOSPITAL | Age: 65
Discharge: HOME | End: 2025-02-21
Attending: INTERNAL MEDICINE
Payer: MEDICARE

## 2025-02-21 VITALS
OXYGEN SATURATION: 98 % | TEMPERATURE: 98 F | HEART RATE: 81 BPM | BODY MASS INDEX: 30 KG/M2 | RESPIRATION RATE: 16 BRPM | WEIGHT: 162.19 LBS | DIASTOLIC BLOOD PRESSURE: 87 MMHG | SYSTOLIC BLOOD PRESSURE: 136 MMHG

## 2025-02-21 DIAGNOSIS — C54.1 CARCINOSARCOMA OF ENDOMETRIUM (HCC): Primary | ICD-10-CM

## 2025-02-21 PROCEDURE — 77336 RADIATION PHYSICS CONSULT: CPT | Performed by: INTERNAL MEDICINE

## 2025-02-21 PROCEDURE — 77386 HC IMRT COMPLEX: CPT | Performed by: INTERNAL MEDICINE

## 2025-02-24 PROCEDURE — 77386 HC IMRT COMPLEX: CPT | Performed by: INTERNAL MEDICINE

## 2025-02-25 PROCEDURE — 77386 HC IMRT COMPLEX: CPT | Performed by: INTERNAL MEDICINE

## 2025-02-26 PROCEDURE — 77386 HC IMRT COMPLEX: CPT | Performed by: INTERNAL MEDICINE

## 2025-02-27 PROCEDURE — 77386 HC IMRT COMPLEX: CPT | Performed by: INTERNAL MEDICINE

## 2025-02-27 NOTE — PROGRESS NOTES
[facility] Radiation Treatment Management Note 21-25    Patient:  Rosario Benitez  Age:  64 year old  Visit Diagnosis:  No diagnosis found.  Primary Rad/Onc:  Dr. Victoria Hdz    Site Delivered Dose (cGy) Prescribed Dose (cGy) Fraction #   GYN PELVIS 5000 5000 25/25     First treatment date:  1/20/25  Concurrent chemotherapy: N/A        2/21/2025    11:31 AM 2/28/2025    11:12 AM 2/28/2025    11:18 AM   Oncology Vitals   Weight 162 lb 3.2 oz 162 lb 3.2 oz    Weight 73.573 kg 73.573 kg    BSA (m2) 1.75 m2 1.75 m2    BMI 29.67 kg/m2 29.67 kg/m2    /87  129/70   Pulse 81  82   Resp 16  18   Temp 98 °F (36.7 °C)  98.2 °F (36.8 °C)   SpO2 98 %  98 %   Pain Score 0  0        Toxicities:  Fatigue Grade 0= None  Constipation Grade 0= None  Diarrhea  Grade 1= Increase of <4 stools per day over baseline; mild increase in ostomy output compared to baseline   Flatulence Grade 0= None  Vaginal bleeding Grade 0= None  Urinary frequency Grade 0= None  Vaginal dryness Grade 0= None  Dysuria on urination Grade 0= None  Urgency on urination Grade 0= None      Nursing Note:  Has intermittent diarrhea  Using imodium- reviewed instructions on use  No urinary symptoms  No vaginal discharge  Denies pain  Has CT chest on 3/3/25 and f/u with Marcio on 3/17  To see Dr. Hdz in 1 month- will call for appt    Carol PINEDA RN    Physician Note:  Subjective:   Completed RT today.  Doing wel.  Some diarrhea, controlled with Imodium.  No urinary issues.  No pain.      Objective:  Unchanged      Treatment setup imaging have been reviewed:  Yes    Assessment/Plan:    Completed RT    Next visit:  f/u per Dr Wilder Ellison

## 2025-02-28 ENCOUNTER — DOCUMENTATION ONLY (OUTPATIENT)
Dept: RADIATION ONCOLOGY | Facility: HOSPITAL | Age: 65
End: 2025-02-28

## 2025-02-28 ENCOUNTER — HOSPITAL ENCOUNTER (OUTPATIENT)
Dept: RADIATION ONCOLOGY | Facility: HOSPITAL | Age: 65
Discharge: HOME | End: 2025-02-28
Attending: INTERNAL MEDICINE
Payer: MEDICARE

## 2025-02-28 VITALS
RESPIRATION RATE: 18 BRPM | SYSTOLIC BLOOD PRESSURE: 129 MMHG | TEMPERATURE: 98 F | HEART RATE: 82 BPM | OXYGEN SATURATION: 98 % | BODY MASS INDEX: 30 KG/M2 | DIASTOLIC BLOOD PRESSURE: 70 MMHG | WEIGHT: 162.19 LBS

## 2025-02-28 DIAGNOSIS — C54.1 CARCINOSARCOMA OF ENDOMETRIUM (HCC): Primary | ICD-10-CM

## 2025-02-28 PROCEDURE — 77386 HC IMRT COMPLEX: CPT | Performed by: INTERNAL MEDICINE

## 2025-02-28 PROCEDURE — 77336 RADIATION PHYSICS CONSULT: CPT | Performed by: INTERNAL MEDICINE

## 2025-02-28 NOTE — PATIENT INSTRUCTIONS
- WE WILL CALL TO SCHEDULE YOUR FOLLOW-UP APPOINTMENT WITH DR. GONSALES IN ONE MONTH      - CALL THE NURSING LINE AT [phone] IF YOU HAVE ANY QUESTIONS/CONCERNS REGARDING RADIATION THERAPY

## 2025-03-03 ENCOUNTER — HOSPITAL ENCOUNTER (OUTPATIENT)
Dept: CT IMAGING | Age: 65
Discharge: HOME | End: 2025-03-03
Attending: INTERNAL MEDICINE
Payer: MEDICARE

## 2025-03-03 DIAGNOSIS — R59.9 LYMPH NODE ENLARGEMENT: ICD-10-CM

## 2025-03-03 PROCEDURE — 71260 CT THORAX DX C+: CPT | Performed by: INTERNAL MEDICINE

## 2025-03-04 NOTE — PROGRESS NOTES
Radiation Oncology Treatment Summary    Diagnosis: carcinosarcoma of the endometrium, pT2 N0 M0, stage II, with outer MMI, microsatellite stable         IGRT: daily CBCT with full bladder    Clinical Course: The treatment course was completed as prescribed. She tolerated RT well with grade 1-2 enteritis that was managed with Imodium prn. She had a treatment break due to illness from RSV.    Follow-up: Return to clinic in 1m or sooner if needed. Continue follow-up with other physicians as planned.     For more information, or to request a detailed radiation treatment summary, please call [facility] Radiation Oncology at our [place] location, [phone].

## 2025-03-06 ENCOUNTER — TELEPHONE (OUTPATIENT)
Age: 65
End: 2025-03-06

## 2025-03-06 DIAGNOSIS — D49.59 UTERINE NEOPLASM: Primary | ICD-10-CM

## 2025-03-06 NOTE — TELEPHONE ENCOUNTER
LVM for patient requesting call back to discuss tumor board recommendations. Contact information provided.       repeat CT in 3 months

## 2025-03-13 NOTE — PROGRESS NOTES
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 Chavarria 7/19/24  Adjuvant Carboplatin + Paclitaxel: 6 cycles: finished 12/2024  Adjuvant RT: finished March 2025    Visit Diagnosis:  No diagnosis found.    History of Present Illness: 64F with a PMH of DM2, HTN and CAD was referred by Dr. Chavarria    -She presented to the ER in July 2024 with pelvic pain and vaginal spotting. CT CAP 7/19/24-endometrial mass extending to vaginal canal (8.6 x 7.2 x 11.2 cm).  25.   -7/19/24-endometrial mass excision with Dr. Chavarria necrotic carcinosarcoma  -7/19/24-radical abdominal hysterectomy with BSO and partial vaginectomy, pelvic LAD with Dr. Chavarria. 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  -Reviewed in tumor board-recommended adjuvant carbo/taxol followed by RT  -Adjuvant Carboplatin + Paclitaxel x 6 cycles: 8/22/24-12/16/24  -Adjuvant radiation finished 02/28/2025. Complicated by G1-2 enteritis   -CT CAP from 03/2025: Faint RLL nodule. Reviewed in tumor board and 3 month follow up imaging recommended.      Interval History  -Doing well. No new complaints   -Diarrhea has resolved   -No vaginal bleeding or discharge  -No pain, cough. Mild dyspnea with exertion     Review of Systems: 12 Point ROS was completed and pertinent positives are in the HPI    Current Outpatient Medications on File Prior to Visit   Medication Sig Dispense Refill    ondansetron 8 MG Oral Tablet Dispersible Take 1 tablet (8 mg total) by mouth every 8 (eight) hours as needed for Nausea. (Patient not taking: Reported on 2/28/2025) 30 tablet 0    prochlorperazine (COMPAZINE) 10 mg tablet Take 1 tablet (10 mg total) by mouth every 6 (six) hours as needed for Nausea. (Patient not taking: Reported on 2/28/2025) 30 tablet 3    carvedilol 6.25 MG Oral Tab Take 1 tablet (6.25  mg total) by mouth 2 (two) times daily.      pravastatin 10 MG Oral Tab Take 1 tablet (10 mg total) by mouth every evening.      metFORMIN 500 MG Oral Tab Take 1 tablet (500 mg total) by mouth 2 (two) times daily with meals.      losartan 25 MG Oral Tab Take 1 tablet (25 mg total) by mouth daily.      aspirin 81 MG Oral Chew Tab Chew 1 tablet (81 mg total) by mouth daily. 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-sided weakness    Uterine cancer (HCC)     Past Surgical History:   Procedure Laterality Date    Anesth,radical leg bone surgery Right     broken ankle    Hysterectomy       Social History     Socioeconomic History    Marital status: Single    Number of children: 3   Occupational History    Occupation: not employed at the moment   Tobacco Use    Smoking status: Former     Types: Cigarettes     Passive exposure: Never    Smokeless tobacco: Never    Tobacco comments:     QUIT 2007   Vaping Use    Vaping status: Never Used   Substance and Sexual Activity    Alcohol use: Yes     Comment: social    Drug use: Never    Sexual activity: Not Currently     Birth control/protection: Hysterectomy      Family History   Problem Relation Age of Onset    Cancer Father 80        lung cancer    Colon Cancer Daughter 42        s/p surgery    Other (multiple osteochondromas) Daughter     Thyroid Cancer Daughter 26        s/p surgery    Other (multiple osteochondromas) Daughter     Cancer Sister 68        lung cancer    Thyroid Cancer Sister 45 - 59    Other (multiple osteochondromas) Grandson     Cancer Niece 40         Lymphoma    Cancer Niece 23        NHL       Physical Exam  Height: --  Weight: --  BSA (Calculated - sq m): --  Pulse: --  BP: --  Temp: --  Do Not Use - Resp Rate: --  SpO2: --    General: NAD, AOX3  HEENT: clear op, mmm, no jvd, no scleral icterus  CV: RRR S1S2 no murmurs  Extremities: No edema   Lungs: CTAB, no increased work of breathing  Abd: soft nt nd +BS no hepatosplenomegaly  Neuro: CN: II-XII grossly intact      Results:  Lab Results   Component Value Date    WBC 4.2 12/16/2024    HGB 10.9 (L) 12/16/2024    HCT 32.9 (L) 12/16/2024    MCV 95.9 12/16/2024    .0 12/16/2024     Lab Results   Component Value Date     12/16/2024    K 4.4 12/16/2024    CO2 23.0 12/16/2024     12/16/2024    BUN 11 12/16/2024    PHOS 3.2 07/21/2024    ALB 4.3 12/16/2024       Final Diagnosis:   A.  Sigmoid epiploica:  -Fibroadipose tissue with fat necrosis associated with calcification.  -Negative for malignancy.     B.  Vaginal canal tumor:  -Necrotic carcinosarcoma.     C. Cervix, uterus, bilateral fallopian tubes and ovaries:  -Carcinosarcoma of the endometrium.  -Tumor invades the outer half of the myometrium (1.8 cm of a 2.1 cm thick myometrium).  -Tumor extends into the cervical stroma.  -Tumor measures 6 cm in greatest dimension.   -Background endometrial polyp.  -No definite lymphatic/vascular invasion.  -Bilateral parametria, negative for tumor.  -Bilateral ovaries with corpus albicantia.  -Bilateral unremarkable fallopian tubes.     D.  Bilateral pelvic lymph nodes:  -5 lymph nodes (0/5), negative for malignancy       Radiology: reviewed   CT CAP 03/2025  1. Previously shown left lower lobe nodule has resolved.      2. Stable faint ovoid nodule right lower lobe.  Suggest continued follow-up.      3. No sign of lobar pneumonia.  No adenopathy.  Hiatal hernia redemonstrated.       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 Chavarria 7/19/24.   -s/p 6 cycles of adjuvant carbo/taxol (12/2024)  -s/p adjuvant RT (03/2025)  -Now on surveillance  -Follow up with gynecology/surgical oncology for clinical exams  -repeat imaging q6 months for 2 years then q12 months for next 3 years    RLL Nodule  -reviewed in lung conference-repeat imaging in 3 months recommended     FH of Cancer; referred to genetics-negative testing     FRANCISCO Buckley MD  [facility]

## 2025-03-17 ENCOUNTER — OFFICE VISIT (OUTPATIENT)
Age: 65
End: 2025-03-17
Attending: INTERNAL MEDICINE
Payer: MEDICARE

## 2025-03-17 VITALS
HEIGHT: 62.68 IN | DIASTOLIC BLOOD PRESSURE: 76 MMHG | WEIGHT: 161 LBS | TEMPERATURE: 97 F | BODY MASS INDEX: 28.89 KG/M2 | RESPIRATION RATE: 18 BRPM | HEART RATE: 83 BPM | OXYGEN SATURATION: 100 % | SYSTOLIC BLOOD PRESSURE: 132 MMHG

## 2025-03-17 DIAGNOSIS — D49.59 UTERINE NEOPLASM: ICD-10-CM

## 2025-03-17 LAB
ALBUMIN SERPL-MCNC: 4.8 G/DL (ref 3.2–4.8)
ALBUMIN/GLOB SERPL: 1.7 {RATIO} (ref 1–2)
ALP LIVER SERPL-CCNC: 93 U/L
ALT SERPL-CCNC: 27 U/L
ANION GAP SERPL CALC-SCNC: 7 MMOL/L (ref 0–18)
AST SERPL-CCNC: 25 U/L (ref ?–34)
BASOPHILS # BLD AUTO: 0.02 X10(3) UL (ref 0–0.2)
BASOPHILS NFR BLD AUTO: 0.4 %
BILIRUB SERPL-MCNC: 0.5 MG/DL (ref 0.2–1.1)
BUN BLD-MCNC: 9 MG/DL (ref 9–23)
CALCIUM BLD-MCNC: 9.8 MG/DL (ref 8.7–10.6)
CHLORIDE SERPL-SCNC: 107 MMOL/L (ref 98–112)
CO2 SERPL-SCNC: 26 MMOL/L (ref 21–32)
CREAT BLD-MCNC: 0.73 MG/DL
EGFRCR SERPLBLD CKD-EPI 2021: 91 ML/MIN/1.73M2 (ref 60–?)
EOSINOPHIL # BLD AUTO: 0.07 X10(3) UL (ref 0–0.7)
EOSINOPHIL NFR BLD AUTO: 1.5 %
ERYTHROCYTE [DISTWIDTH] IN BLOOD BY AUTOMATED COUNT: 13.5 %
GLOBULIN PLAS-MCNC: 2.9 G/DL (ref 2–3.5)
GLUCOSE BLD-MCNC: 142 MG/DL (ref 70–99)
HCT VFR BLD AUTO: 34.5 %
HGB BLD-MCNC: 11.5 G/DL
IMM GRANULOCYTES # BLD AUTO: 0.02 X10(3) UL (ref 0–1)
IMM GRANULOCYTES NFR BLD: 0.4 %
LYMPHOCYTES # BLD AUTO: 0.64 X10(3) UL (ref 1–4)
LYMPHOCYTES NFR BLD AUTO: 13.7 %
MCH RBC QN AUTO: 32.7 PG (ref 26–34)
MCHC RBC AUTO-ENTMCNC: 33.3 G/DL (ref 31–37)
MCV RBC AUTO: 98 FL
MONOCYTES # BLD AUTO: 0.46 X10(3) UL (ref 0.1–1)
MONOCYTES NFR BLD AUTO: 9.8 %
NEUTROPHILS # BLD AUTO: 3.47 X10 (3) UL (ref 1.5–7.7)
NEUTROPHILS # BLD AUTO: 3.47 X10(3) UL (ref 1.5–7.7)
NEUTROPHILS NFR BLD AUTO: 74.2 %
OSMOLALITY SERPL CALC.SUM OF ELEC: 291 MOSM/KG (ref 275–295)
PLATELET # BLD AUTO: 155 10(3)UL (ref 150–450)
POTASSIUM SERPL-SCNC: 4.2 MMOL/L (ref 3.5–5.1)
PROT SERPL-MCNC: 7.7 G/DL (ref 5.7–8.2)
RBC # BLD AUTO: 3.52 X10(6)UL
SODIUM SERPL-SCNC: 140 MMOL/L (ref 136–145)
WBC # BLD AUTO: 4.7 X10(3) UL (ref 4–11)

## 2025-03-17 NOTE — PROGRESS NOTES
Patient here for follow-up. Had a recent CT scan. Will see RT next week. States she feels well overall. Denies any changes or updates since last visit.

## 2025-03-27 NOTE — PROGRESS NOTES
Nursing Follow-Up Note    Patient: Rosario Benitez  YOB: 1960  Age: 64 year old  Radiation Oncologist: Dr. Victoria Hdz  Referring Physician: Victoria Hdz  Chief Complaint:   Chief Complaint   Patient presents with    Follow - Up     Date: 3/27/2025    Toxicities: N/A    Vital Signs: /77 (BP Location: Left arm, Patient Position: Sitting, Cuff Size: adult)   Pulse 75   Temp 97.3 °F (36.3 °C) (Tympanic)   Resp 18   Wt 73.8 kg (162 lb 9.6 oz)   SpO2 99%   BMI 29.10 kg/m² ,   Wt Readings from Last 6 Encounters:   03/28/25 73.8 kg (162 lb 9.6 oz)   03/17/25 73 kg (161 lb)   02/28/25 73.6 kg (162 lb 3.2 oz)   02/21/25 73.6 kg (162 lb 3.2 oz)   02/14/25 73.1 kg (161 lb 3.2 oz)   01/31/25 72.9 kg (160 lb 12.8 oz)       Allergies:  Allergies[1]    Nursing Note: Hx of endometrial carcinosarcoma. S/p radical abdominal hysterectomy with BSO and partial vaginectomy, pelvic LAD ([place]) 7/29/24. Completed adjuvant chemo Dec. 2024. Completed adjuvant RT 2/28/25. Here for one month follow up today. Pt states she feels well today. No diarrhea or constipation. Denies urinary urgency, frequency or dysuria. No vaginal discharge. No vaginal bleeding. Saw Dr. Buckley 3/17/25- plan for CT in June and then f/u.          [1] No Known Allergies

## 2025-03-28 ENCOUNTER — HOSPITAL ENCOUNTER (OUTPATIENT)
Dept: RADIATION ONCOLOGY | Facility: HOSPITAL | Age: 65
Discharge: HOME | End: 2025-03-28
Attending: INTERNAL MEDICINE
Payer: MEDICARE

## 2025-03-28 VITALS
OXYGEN SATURATION: 99 % | WEIGHT: 162.63 LBS | TEMPERATURE: 97 F | BODY MASS INDEX: 29 KG/M2 | HEART RATE: 75 BPM | DIASTOLIC BLOOD PRESSURE: 77 MMHG | RESPIRATION RATE: 18 BRPM | SYSTOLIC BLOOD PRESSURE: 132 MMHG

## 2025-03-28 DIAGNOSIS — C54.1 CARCINOSARCOMA OF ENDOMETRIUM (HCC): Primary | ICD-10-CM

## 2025-03-28 NOTE — PATIENT INSTRUCTIONS
- WE WILL CALL TO SCHEDULE YOUR FOLLOW-UP APPOINTMENT WITH DR. GONSALES IN JUNE AFTER YOUR CT    - FOLLOW-UP WITH DR RUBALCAVA    - CALL THE NURSING LINE AT [phone] IF YOU HAVE ANY QUESTIONS/CONCERNS REGARDING RADIATION THERAPY

## 2025-03-28 NOTE — PROGRESS NOTES
[facility]  Radiation Oncology Follow-up    Patient Name: Rosario Benitez   MRN: [id number]  Referring Physician: Darwin Buckley MD; Tex Chavarria MD     Diagnosis: carcinosarcoma of the endometrium, pT2 N0 M0, stage II, with outer MMI, microsatellite stable     Oncologic History  24: total hysterectomy, upper vaginectomy, and pelvic LNB.   Completed 6 cycles of adjuvant carbo/paclitaxel on 24.  25: completed adjuvant RT to the pelvis, 5000 cGy in 25 fractions.    Interval History:  The patient is a 64 year old female with above diagnosis and treatment rendered who presents today for follow-up.    Doing well  No urinary issues, no vaginal discharge  No diarrhea  Had recent CT imaging    Medications  Current Outpatient Medications   Medication Sig Dispense Refill    carvedilol 6.25 MG Oral Tab Take 1 tablet (6.25 mg total) by mouth 2 (two) times daily.      pravastatin 10 MG Oral Tab Take 1 tablet (10 mg total) by mouth every evening.      metFORMIN 500 MG Oral Tab Take 1 tablet (500 mg total) by mouth 2 (two) times daily with meals.      losartan 25 MG Oral Tab Take 1 tablet (25 mg total) by mouth daily.      ondansetron 8 MG Oral Tablet Dispersible Take 1 tablet (8 mg total) by mouth every 8 (eight) hours as needed for Nausea. (Patient not taking: Reported on 3/28/2025) 30 tablet 0    prochlorperazine (COMPAZINE) 10 mg tablet Take 1 tablet (10 mg total) by mouth every 6 (six) hours as needed for Nausea. (Patient not taking: Reported on 3/28/2025) 30 tablet 3    aspirin 81 MG Oral Chew Tab Chew 1 tablet (81 mg total) by mouth daily. (Patient not taking: Reported on 2024)         Physical Exam  Vitals:    25 0947   BP: 132/77   Pulse: 75   Resp: 18   Temp: 97.3 °F (36.3 °C)       ECO    Gen: NAD  HEENT: NCAT, EOMI  Neck: no LAD  CV: RRR  Lungs: CTAB  Ext: wwp  Neuro: CN II-XII grossly intact    Imaging: 3/3/25 CT chest with resolved LLL nodule, follow-up recommended for stable paint  ovoid nodule in RLL    Assessment: 64 year old female with carcinosarcoma of the endometrium, pT2 N0 M0, stage II, with outer MMI, microsatellite stable s/p primary surgical resection, adjuvant chemo, and then adjuvant pelvic RT. She is doing well, clinically NICO.    Plan:     -repeat imaging in June  -RTC Margarita for exam   -given vaginal dilator and instructed in its use    Victoria Hdz MD  Radiation Oncology    No charge

## 2025-06-27 ENCOUNTER — HOSPITAL ENCOUNTER (OUTPATIENT)
Dept: CT IMAGING | Age: 65
Discharge: HOME | End: 2025-06-27
Attending: INTERNAL MEDICINE
Payer: MEDICARE

## 2025-06-27 DIAGNOSIS — D49.59 UTERINE NEOPLASM: ICD-10-CM

## 2025-06-27 PROCEDURE — 74177 CT ABD & PELVIS W/CONTRAST: CPT | Performed by: INTERNAL MEDICINE

## 2025-06-27 PROCEDURE — 71260 CT THORAX DX C+: CPT | Performed by: INTERNAL MEDICINE

## 2025-07-07 ENCOUNTER — OFFICE VISIT (OUTPATIENT)
Age: 65
End: 2025-07-07
Attending: INTERNAL MEDICINE
Payer: MEDICARE

## 2025-07-07 VITALS
SYSTOLIC BLOOD PRESSURE: 143 MMHG | DIASTOLIC BLOOD PRESSURE: 83 MMHG | HEIGHT: 62.68 IN | TEMPERATURE: 97 F | OXYGEN SATURATION: 96 % | BODY MASS INDEX: 28.98 KG/M2 | HEART RATE: 74 BPM | WEIGHT: 161.5 LBS | RESPIRATION RATE: 18 BRPM

## 2025-07-07 DIAGNOSIS — C54.1 ENDOMETRIAL CANCER (HCC): Primary | ICD-10-CM

## 2025-07-07 DIAGNOSIS — D49.59 UTERINE NEOPLASM: ICD-10-CM

## 2025-07-07 LAB
ALBUMIN SERPL-MCNC: 4.7 G/DL (ref 3.2–4.8)
ALBUMIN/GLOB SERPL: 2 {RATIO} (ref 1–2)
ALP LIVER SERPL-CCNC: 97 U/L (ref 55–142)
ALT SERPL-CCNC: 20 U/L (ref 10–49)
ANION GAP SERPL CALC-SCNC: 10 MMOL/L (ref 0–18)
AST SERPL-CCNC: 21 U/L (ref ?–34)
BASOPHILS # BLD AUTO: 0.02 X10(3) UL (ref 0–0.2)
BASOPHILS NFR BLD AUTO: 0.6 %
BILIRUB SERPL-MCNC: 0.5 MG/DL (ref 0.2–1.1)
BUN BLD-MCNC: 14 MG/DL (ref 9–23)
CALCIUM BLD-MCNC: 9 MG/DL (ref 8.7–10.6)
CANCER AG125 SERPL-ACNC: 20 U/ML (ref ?–30.2)
CHLORIDE SERPL-SCNC: 106 MMOL/L (ref 98–112)
CO2 SERPL-SCNC: 25 MMOL/L (ref 21–32)
CREAT BLD-MCNC: 0.82 MG/DL (ref 0.55–1.02)
EGFRCR SERPLBLD CKD-EPI 2021: 79 ML/MIN/1.73M2 (ref 60–?)
EOSINOPHIL # BLD AUTO: 0.04 X10(3) UL (ref 0–0.7)
EOSINOPHIL NFR BLD AUTO: 1.1 %
ERYTHROCYTE [DISTWIDTH] IN BLOOD BY AUTOMATED COUNT: 13.2 %
FASTING STATUS PATIENT QL REPORTED: NO
GLOBULIN PLAS-MCNC: 2.3 G/DL (ref 2–3.5)
GLUCOSE BLD-MCNC: 131 MG/DL (ref 70–99)
HCT VFR BLD AUTO: 36.7 % (ref 35–48)
HGB BLD-MCNC: 12.3 G/DL (ref 12–16)
IMM GRANULOCYTES # BLD AUTO: 0.02 X10(3) UL (ref 0–1)
IMM GRANULOCYTES NFR BLD: 0.6 %
LYMPHOCYTES # BLD AUTO: 0.74 X10(3) UL (ref 1–4)
LYMPHOCYTES NFR BLD AUTO: 21 %
MCH RBC QN AUTO: 31.7 PG (ref 26–34)
MCHC RBC AUTO-ENTMCNC: 33.5 G/DL (ref 31–37)
MCV RBC AUTO: 94.6 FL (ref 80–100)
MONOCYTES # BLD AUTO: 0.37 X10(3) UL (ref 0.1–1)
MONOCYTES NFR BLD AUTO: 10.5 %
NEUTROPHILS # BLD AUTO: 2.34 X10 (3) UL (ref 1.5–7.7)
NEUTROPHILS # BLD AUTO: 2.34 X10(3) UL (ref 1.5–7.7)
NEUTROPHILS NFR BLD AUTO: 66.2 %
OSMOLALITY SERPL CALC.SUM OF ELEC: 294 MOSM/KG (ref 275–295)
PLATELET # BLD AUTO: 195 10(3)UL (ref 150–450)
POTASSIUM SERPL-SCNC: 4.3 MMOL/L (ref 3.5–5.1)
PROT SERPL-MCNC: 7 G/DL (ref 5.7–8.2)
RBC # BLD AUTO: 3.88 X10(6)UL (ref 3.8–5.3)
SODIUM SERPL-SCNC: 141 MMOL/L (ref 136–145)
WBC # BLD AUTO: 3.5 X10(3) UL (ref 4–11)

## 2025-07-07 NOTE — PROGRESS NOTES
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 Chavarria 7/19/24  Adjuvant Carboplatin + Paclitaxel: 6 cycles: finished 12/2024  Adjuvant RT: finished March 2025    Visit Diagnosis:  1. Endometrial cancer (HCC)    2. Uterine neoplasm        History of Present Illness: 64F with a PMH of DM2, HTN and CAD was referred by Dr. Chavarria    -She presented to the ER in July 2024 with pelvic pain and vaginal spotting. CT CAP 7/19/24-endometrial mass extending to vaginal canal (8.6 x 7.2 x 11.2 cm).  25.   -7/19/24-endometrial mass excision with Dr. Chavarria necrotic carcinosarcoma  -7/19/24-radical abdominal hysterectomy with BSO and partial vaginectomy, pelvic LAD with Dr. Chavarria. 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  -Reviewed in tumor board-recommended adjuvant carbo/taxol followed by RT  -Adjuvant Carboplatin + Paclitaxel x 6 cycles: 8/22/24-12/16/24  -Adjuvant radiation finished 02/28/2025. Complicated by G1-2 enteritis   -CT CAP from 03/2025: Faint RLL nodule. Reviewed in tumor board and 3 month follow up imaging recommended.      Interval History  -CT CAP reviewed-RLL nodule appears smaller. No evidence of metastatic disease. Possible small area of small bowel stricturing   -No cough, dyspnea. Diarrhea. No vaginal drainage       Review of Systems: 12 Point ROS was completed and pertinent positives are in the HPI    Current Outpatient Medications on File Prior to Visit   Medication Sig Dispense Refill    carvedilol 6.25 MG Oral Tab Take 1 tablet (6.25 mg total) by mouth in the morning and 1 tablet (6.25 mg total) before bedtime.      pravastatin 10 MG Oral Tab Take 1 tablet (10 mg total) by mouth every evening.      metFORMIN 500 MG Oral Tab Take 1 tablet (500 mg total) by mouth in the morning and 1 tablet (500 mg total) in the  evening. Take with meals.      losartan 25 MG Oral Tab Take 1 tablet (25 mg total) by mouth in the morning.      aspirin 81 MG Oral Chew Tab Chew 1 tablet (81 mg total) by mouth daily.       Current Facility-Administered Medications on File Prior to Visit   Medication Dose Route Frequency Provider Last Rate Last Admin    [COMPLETED] iopamidol 76% (ISOVUE-370) injection for power injector  100 mL Intravenous ONCE PRN Matthew Buckley MD   100 mL at 06/27/25 0914     Past Medical History:    Diabetes (HCC)    Essential hypertension    Heart attack (HCC)    states has minimal residual left-sided weakness    Uterine cancer (HCC)     Past Surgical History:   Procedure Laterality Date    Anesth,radical leg bone surgery Right     broken ankle    Hysterectomy       Social History     Socioeconomic History    Marital status: Single    Number of children: 3   Occupational History    Occupation: not employed at the moment   Tobacco Use    Smoking status: Former     Types: Cigarettes     Passive exposure: Never    Smokeless tobacco: Never    Tobacco comments:     QUIT 2007   Vaping Use    Vaping status: Never Used   Substance and Sexual Activity    Alcohol use: Yes     Comment: social    Drug use: Never    Sexual activity: Not Currently     Birth control/protection: Hysterectomy      Family History   Problem Relation Age of Onset    Cancer Father 80        lung cancer    Colon Cancer Daughter 42        s/p surgery    Other (multiple osteochondromas) Daughter     Thyroid Cancer Daughter 26        s/p surgery    Other (multiple osteochondromas) Daughter     Cancer Sister 68        lung cancer    Thyroid Cancer Sister 45 - 59    Other (multiple osteochondromas) Grandson     Cancer Niece 40        Lymphoma    Cancer Niece 23        NHL       Physical Exam  Height: 159.2 cm (5' 2.68\") (07/07 1045)  Weight: 73.3 kg (161 lb 8 oz) (07/07 1045)  BSA (Calculated - sq m): 1.76 sq meters (07/07 1045)  Pulse: 74 (07/07 1045)  BP: 143/83  (07/07 1045)  Temp: 97.3 °F (36.3 °C) (07/07 1045)  Do Not Use - Resp Rate: --  SpO2: 96 % (07/07 1045)    General: NAD, AOX3  HEENT: clear op, mmm, no jvd, no scleral icterus  CV: RRR S1S2 no murmurs  Extremities: No edema   Lungs: CTAB, no increased work of breathing  Abd: soft nt nd +BS no hepatosplenomegaly  Neuro: CN: II-XII grossly intact      Results:  Lab Results   Component Value Date    WBC 3.5 (L) 07/07/2025    HGB 12.3 07/07/2025    HCT 36.7 07/07/2025    MCV 94.6 07/07/2025    .0 07/07/2025     Lab Results   Component Value Date     07/07/2025    K 4.3 07/07/2025    CO2 25.0 07/07/2025     07/07/2025    BUN 14 07/07/2025    PHOS 3.2 07/21/2024    ALB 4.7 07/07/2025       Final Diagnosis:   A.  Sigmoid epiploica:  -Fibroadipose tissue with fat necrosis associated with calcification.  -Negative for malignancy.     B.  Vaginal canal tumor:  -Necrotic carcinosarcoma.     C. Cervix, uterus, bilateral fallopian tubes and ovaries:  -Carcinosarcoma of the endometrium.  -Tumor invades the outer half of the myometrium (1.8 cm of a 2.1 cm thick myometrium).  -Tumor extends into the cervical stroma.  -Tumor measures 6 cm in greatest dimension.   -Background endometrial polyp.  -No definite lymphatic/vascular invasion.  -Bilateral parametria, negative for tumor.  -Bilateral ovaries with corpus albicantia.  -Bilateral unremarkable fallopian tubes.     D.  Bilateral pelvic lymph nodes:  -5 lymph nodes (0/5), negative for malignancy       Radiology: reviewed   CT CAP June 2025  1.  Right lower lobe pulm nodule appears mildly decreased in size from prior exam. (7 mm to 5 mm)  2.  No evidence of metastatic disease in the abdomen or pelvis.   3.  Mildly dilated loop of small bowel in the pelvis with tapering may suggest an area of stricturing.   4.  Small amount of free fluid is noted in the pelvis.   5.  Mild bladder wall thickening is nonspecific. Correlate clinically.     CT CAP 03/2025  1.  Previously shown left lower lobe nodule has resolved.      2. Stable faint ovoid nodule right lower lobe.  Suggest continued follow-up.      3. No sign of lobar pneumonia.  No adenopathy.  Hiatal hernia redemonstrated.       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 Chavarria 7/19/24.   -s/p 6 cycles of adjuvant carbo/taxol (12/2024)  -s/p adjuvant RT (03/2025)  -Now on surveillance  -Follow up with gynecology/surgical oncology for clinical exams  -repeat imaging q6 months for 2 years then q12 months for next 3 years    RLL Nodule--appears benign. Smaller on most repeat imaging  -Repeat CT in 6 months     FH of Cancer; referred to genetics-negative testing     FRANCISCO Buckley MD  [facility]

## 2025-07-07 NOTE — PROGRESS NOTES
Education Record    Learner:  Patient, daughter    Pt here for: 3 month f/u    Barriers / Limitations:  None    Method:  Brief focused discussion and  reinforcement    General Topics:  Plan of care reviewed    Outcome: Pt arrived ambulating independently accompanied by daughter. Per pt she feels good and has no concerns at this time.

## 2025-07-07 NOTE — PROGRESS NOTES
Nursing Follow-Up Note    Patient: Rosario Benitez  YOB: 1960  Age: 64 year old  Radiation Oncologist: Dr. Victoria Hdz  Referring Physician: Dr. Aura Rivas  Chief Complaint:   Chief Complaint   Patient presents with    Follow - Up     ENDOMETRIAL SARCOMA     Date: 7/7/2025    Toxicities: N/A    Vital Signs: /79 (BP Location: Left arm, Patient Position: Sitting, Cuff Size: adult)   Pulse 74   Temp 97.5 °F (36.4 °C) (Tympanic)   Resp 16   Wt 72.9 kg (160 lb 12.8 oz)   SpO2 98%   BMI 28.78 kg/m² ,   Wt Readings from Last 6 Encounters:   07/08/25 72.9 kg (160 lb 12.8 oz)   07/07/25 73.3 kg (161 lb 8 oz)   03/28/25 73.8 kg (162 lb 9.6 oz)   03/17/25 73 kg (161 lb)   02/28/25 73.6 kg (162 lb 3.2 oz)   02/21/25 73.6 kg (162 lb 3.2 oz)       Allergies:  Allergies[1]    Nursing Note: History of endometrial carcinosarcoma, s/p surgery by Dr. Chavarria in July 2024. Completed adjuvant chemo in Dec 2024, then completed pelvic RT on 2/28/25. Repeat CT CAP done on 6/27/25. Followed up with Dr. Buckley yesterday (q6mo.). Pt does not have a gyne oncologist. Feeling well, energy level fair. Denies dysuria, hematuria or incontinence. BM regular with occ constipation. No rectal bleeding or proctitis. Denies vaginal discharge or pelvic pain. Not sexually active, rarely using vaginal dilator.          [1] No Known Allergies

## 2025-07-08 ENCOUNTER — HOSPITAL ENCOUNTER (OUTPATIENT)
Dept: RADIATION ONCOLOGY | Facility: HOSPITAL | Age: 65
Discharge: HOME | End: 2025-07-08
Attending: INTERNAL MEDICINE
Payer: MEDICARE

## 2025-07-08 VITALS
WEIGHT: 160.81 LBS | TEMPERATURE: 98 F | SYSTOLIC BLOOD PRESSURE: 139 MMHG | HEART RATE: 74 BPM | RESPIRATION RATE: 16 BRPM | BODY MASS INDEX: 29 KG/M2 | DIASTOLIC BLOOD PRESSURE: 79 MMHG | OXYGEN SATURATION: 98 %

## 2025-07-08 DIAGNOSIS — C54.1 CARCINOSARCOMA OF ENDOMETRIUM (HCC): Primary | ICD-10-CM

## 2025-07-08 PROCEDURE — 99213 OFFICE O/P EST LOW 20 MIN: CPT

## 2025-07-08 NOTE — PROGRESS NOTES
[facility]  Radiation Oncology Follow-up    Patient Name: Rosario Benitez   MRN: [id number]  Referring Physician: Darwin Buckley MD; Tex Chavarria MD     Diagnosis: carcinosarcoma of the endometrium, pT2 N0 M0, stage II, with outer MMI, microsatellite stable     Oncologic History  24: total hysterectomy, upper vaginectomy, and pelvic LNB.   Completed 6 cycles of adjuvant carbo/paclitaxel on 24.  25: completed adjuvant RT to the pelvis, 5000 cGy in 25 fractions.    Cancer Screening  -Mammogram: due, PCP placed order  -Colonoscopy: about 10 year ago, per pt she has cologuard kit with plans for possible c-scope pending results    Interval History:  The patient is a 64 year old female with above diagnosis and treatment rendered who presents today for follow-up.    Last seen 2025  Got dilator at that time  Rarely uses it, reports no problems with insertion  No GI or GI complaints   Saw medon ystd   Medonc plans repeat imaging in 6m and RTC then     Medications  Current Medications[1]    Physical Exam  Vitals:    25 0957   BP: 139/79   Pulse: 74   Resp: 16   Temp: 97.5 °F (36.4 °C)       ECO    Gen: NAD  HEENT: NCAT, EOMI  CV: RRR  Lungs: CTAB  Ext: wwp, no cyanosis or edema  Neuro: CN II-XII grossly intact  Gyn: normal ext genitalia, vaginal cuff intact without lesions, no scar tissue, no adnexal masses    Labs:  wnl    Imaging: CT CAP with contrast on 25 reviewed   1.  Right lower lobe pulm nodule appears mildly decreased in size from prior exam.   2.  No evidence of metastatic disease in the abdomen or pelvis.   3.  Mildly dilated loop of small bowel in the pelvis with tapering may suggest an area of stricturing.   4.  Small amount of free fluid is noted in the pelvis.   5.  Mild bladder wall thickening is nonspecific. Correlate clinically.     Assessment: 64 year old female with carcinosarcoma of the endometrium, pT2 N0 M0, stage II, with outer MMI, microsatellite stable  s/p primary surgery, adjuvant chemo, and adjuvant pelvic RT completed Feb 2025. She is clinically doing well, NICO.    Plan:     -cont dilator use  -repeat imaging in 6m per medonc  -RTC to see me in 6m  -gynonc eval in 3m, refer to Dr. Patiño  -mamniya and cologuard per PCP    Victoria Hdz MD  Radiation Oncology    [facility] high including chart review, personal review of imaging (if applicable), physical exam, and time spent with the patient in counseling.          [1]   Current Outpatient Medications   Medication Sig Dispense Refill    carvedilol 6.25 MG Oral Tab Take 1 tablet (6.25 mg total) by mouth in the morning and 1 tablet (6.25 mg total) before bedtime.      pravastatin 10 MG Oral Tab Take 1 tablet (10 mg total) by mouth every evening.      metFORMIN 500 MG Oral Tab Take 1 tablet (500 mg total) by mouth in the morning and 1 tablet (500 mg total) in the evening. Take with meals.      losartan 25 MG Oral Tab Take 1 tablet (25 mg total) by mouth in the morning.      aspirin 81 MG Oral Chew Tab Chew 1 tablet (81 mg total) by mouth in the morning.

## 2025-07-08 NOTE — PATIENT INSTRUCTIONS
- WE WILL CALL TO SCHEDULE YOUR FOLLOW-UP APPOINTMENT WITH DR. GONSALES IN 6 MONTHS      - FOLLOW-UP WITH GYNE ONCOLOGIST IN 3 MONTHS      - CALL [phone] IF YOU HAVE ANY QUESTIONS REGARDING RADIATION THERAPY

## 2025-07-22 ENCOUNTER — APPOINTMENT (OUTPATIENT)
Dept: CT IMAGING | Age: 65
End: 2025-07-22
Attending: EMERGENCY MEDICINE
Payer: MEDICARE

## 2025-07-22 ENCOUNTER — HOSPITAL ENCOUNTER (EMERGENCY)
Age: 65
Discharge: HOME | End: 2025-07-22
Attending: EMERGENCY MEDICINE
Payer: MEDICARE

## 2025-07-22 ENCOUNTER — APPOINTMENT (OUTPATIENT)
Dept: GENERAL RADIOLOGY | Age: 65
End: 2025-07-22
Attending: EMERGENCY MEDICINE
Payer: MEDICARE

## 2025-07-22 VITALS
OXYGEN SATURATION: 96 % | WEIGHT: 161 LBS | DIASTOLIC BLOOD PRESSURE: 71 MMHG | HEIGHT: 62 IN | TEMPERATURE: 98 F | RESPIRATION RATE: 16 BRPM | BODY MASS INDEX: 29.63 KG/M2 | SYSTOLIC BLOOD PRESSURE: 151 MMHG | HEART RATE: 80 BPM

## 2025-07-22 DIAGNOSIS — H43.391 FLOATERS IN VISUAL FIELD, RIGHT: ICD-10-CM

## 2025-07-22 DIAGNOSIS — H81.392 PERIPHERAL VERTIGO INVOLVING LEFT EAR: Primary | ICD-10-CM

## 2025-07-22 LAB
ANION GAP SERPL CALC-SCNC: 9 MMOL/L (ref 0–18)
BASOPHILS # BLD AUTO: 0.03 X10(3) UL (ref 0–0.2)
BASOPHILS NFR BLD AUTO: 0.7 %
BUN BLD-MCNC: 19 MG/DL (ref 9–23)
CALCIUM BLD-MCNC: 9.1 MG/DL (ref 8.7–10.6)
CHLORIDE SERPL-SCNC: 106 MMOL/L (ref 98–112)
CO2 SERPL-SCNC: 24 MMOL/L (ref 21–32)
CREAT BLD-MCNC: 0.91 MG/DL (ref 0.55–1.02)
EGFRCR SERPLBLD CKD-EPI 2021: 70 ML/MIN/1.73M2 (ref 60–?)
EOSINOPHIL # BLD AUTO: 0.08 X10(3) UL (ref 0–0.7)
EOSINOPHIL NFR BLD AUTO: 1.8 %
ERYTHROCYTE [DISTWIDTH] IN BLOOD BY AUTOMATED COUNT: 13 %
GLUCOSE BLD-MCNC: 120 MG/DL (ref 70–99)
HCT VFR BLD AUTO: 35.8 % (ref 35–48)
HGB BLD-MCNC: 11.9 G/DL (ref 12–16)
IMM GRANULOCYTES # BLD AUTO: 0.03 X10(3) UL (ref 0–1)
IMM GRANULOCYTES NFR BLD: 0.7 %
LYMPHOCYTES # BLD AUTO: 1 X10(3) UL (ref 1–4)
LYMPHOCYTES NFR BLD AUTO: 22.2 %
MCH RBC QN AUTO: 31.1 PG (ref 26–34)
MCHC RBC AUTO-ENTMCNC: 33.2 G/DL (ref 31–37)
MCV RBC AUTO: 93.5 FL (ref 80–100)
MONOCYTES # BLD AUTO: 0.45 X10(3) UL (ref 0.1–1)
MONOCYTES NFR BLD AUTO: 10 %
NEUTROPHILS # BLD AUTO: 2.92 X10 (3) UL (ref 1.5–7.7)
NEUTROPHILS # BLD AUTO: 2.92 X10(3) UL (ref 1.5–7.7)
NEUTROPHILS NFR BLD AUTO: 64.6 %
OSMOLALITY SERPL CALC.SUM OF ELEC: 291 MOSM/KG (ref 275–295)
PLATELET # BLD AUTO: 198 10(3)UL (ref 150–450)
POTASSIUM SERPL-SCNC: 4.4 MMOL/L (ref 3.5–5.1)
RBC # BLD AUTO: 3.83 X10(6)UL (ref 3.8–5.3)
SODIUM SERPL-SCNC: 139 MMOL/L (ref 136–145)
TROPONIN I SERPL HS-MCNC: 3 NG/L (ref ?–34)
WBC # BLD AUTO: 4.5 X10(3) UL (ref 4–11)

## 2025-07-22 PROCEDURE — 96374 THER/PROPH/DIAG INJ IV PUSH: CPT

## 2025-07-22 PROCEDURE — 99285 EMERGENCY DEPT VISIT HI MDM: CPT

## 2025-07-22 PROCEDURE — 85025 COMPLETE CBC W/AUTO DIFF WBC: CPT | Performed by: EMERGENCY MEDICINE

## 2025-07-22 PROCEDURE — 93010 ELECTROCARDIOGRAM REPORT: CPT

## 2025-07-22 PROCEDURE — 71045 X-RAY EXAM CHEST 1 VIEW: CPT | Performed by: EMERGENCY MEDICINE

## 2025-07-22 PROCEDURE — 70498 CT ANGIOGRAPHY NECK: CPT | Performed by: EMERGENCY MEDICINE

## 2025-07-22 PROCEDURE — 84484 ASSAY OF TROPONIN QUANT: CPT | Performed by: EMERGENCY MEDICINE

## 2025-07-22 PROCEDURE — 70496 CT ANGIOGRAPHY HEAD: CPT | Performed by: EMERGENCY MEDICINE

## 2025-07-22 PROCEDURE — 93005 ELECTROCARDIOGRAM TRACING: CPT

## 2025-07-22 PROCEDURE — 80048 BASIC METABOLIC PNL TOTAL CA: CPT | Performed by: EMERGENCY MEDICINE

## 2025-07-22 RX ORDER — MECLIZINE HYDROCHLORIDE 25 MG/1
25 TABLET ORAL ONCE
Status: COMPLETED | OUTPATIENT
Start: 2025-07-22 | End: 2025-07-22

## 2025-07-22 RX ORDER — PROCHLORPERAZINE EDISYLATE 5 MG/ML
5 INJECTION INTRAMUSCULAR; INTRAVENOUS ONCE
Status: COMPLETED | OUTPATIENT
Start: 2025-07-22 | End: 2025-07-22

## 2025-07-22 RX ORDER — MECLIZINE HYDROCHLORIDE 50 MG/1
50 TABLET ORAL 3 TIMES DAILY PRN
Qty: 15 TABLET | Refills: 0 | Status: SHIPPED | OUTPATIENT
Start: 2025-07-22 | End: 2025-07-27

## 2025-07-23 LAB
ATRIAL RATE: 80 BPM
P AXIS: 54 DEGREES
P-R INTERVAL: 160 MS
Q-T INTERVAL: 382 MS
QRS DURATION: 86 MS
QTC CALCULATION (BEZET): 440 MS
R AXIS: 20 DEGREES
T AXIS: 61 DEGREES
VENTRICULAR RATE: 80 BPM

## 2025-07-23 NOTE — ED INITIAL ASSESSMENT (HPI)
63 y/o F arrives to ED with c/o headache and 2 floaters in right eye that started 4 days ago. Patient reports intermittent dizziness that started 7/13/25.

## 2025-07-23 NOTE — ED PROVIDER NOTES
Patient Seen in: [facility] In [place]        History  Chief Complaint   Patient presents with    Eye Visual Problem    Dizziness     Stated Complaint: Headache, dizziness, and eye floater to right eye.    Subjective:   The history is provided by the patient.       64-year-old female with history of HTN, DM, recently treated uterine endometrial cancer presents to the ER with headache, dizziness, floaters in her right eye for the past 2 weeks.  Patient denies any head trauma, falls, injuries.  Reports right-sided pressure sensation in sinus pressure.  Reports bilateral ear fullness.  Reports dizziness worse with head turning and looking up.  Symptoms started initially with room spinning dizziness, later just noticing dizziness with head turning.  Denies diplopia, dysphagia, dysarthria, extremity paresthesias or weakness.  She contacted her PCP who advised her to come to the hospital for further evaluation      Objective:     Past Medical History:    Diabetes (HCC)    Essential hypertension    Heart attack (HCC)    states has minimal residual left-sided weakness    Uterine cancer (HCC)              Past Surgical History:   Procedure Laterality Date    Anesth,radical leg bone surgery Right     broken ankle    Hysterectomy                  Social History     Socioeconomic History    Marital status: Single    Number of children: 3   Occupational History    Occupation: not employed at the moment   Tobacco Use    Smoking status: Former     Types: Cigarettes     Passive exposure: Never    Smokeless tobacco: Never    Tobacco comments:     QUIT 2007   Vaping Use    Vaping status: Never Used   Substance and Sexual Activity    Alcohol use: Yes     Comment: social    Drug use: Never    Sexual activity: Not Currently     Birth control/protection: Hysterectomy   Social History Narrative    Single, has 3 daughters    Lives with 1 of her daughters     Social Drivers of Health     Food Insecurity: No Food  Insecurity (7/18/2024)    Food Insecurity     Food Insecurity: Never true   Transportation Needs: No Transportation Needs (7/18/2024)    Transportation Needs     Lack of Transportation: No   Housing Stability: Low Risk  (7/18/2024)    Housing Stability     Housing Instability: No                                Physical Exam    ED Triage Vitals [07/22/25 1921]   /71   Pulse 80   Resp 16   Temp 98.1 °F (36.7 °C)   Temp src Oral   SpO2 96 %   O2 Device None (Room air)       Current Vitals:   Vital Signs  BP: 151/71  Pulse: 80  Resp: 16  Temp: 98.1 °F (36.7 °C)  Temp src: Oral    Oxygen Therapy  SpO2: 96 %  O2 Device: None (Room air)            Physical Exam  Vitals and nursing note reviewed.   Constitutional:       General: She is not in acute distress.     Appearance: She is well-developed. She is not ill-appearing.   HENT:      Head: Normocephalic and atraumatic.   Eyes:      Extraocular Movements: Extraocular movements intact.      Pupils: Pupils are equal, round, and reactive to light.   Cardiovascular:      Rate and Rhythm: Normal rate and regular rhythm.      Pulses: Normal pulses.      Heart sounds: Normal heart sounds.   Pulmonary:      Effort: Pulmonary effort is normal. No respiratory distress.      Breath sounds: Normal breath sounds.   Abdominal:      General: Abdomen is flat. There is no distension.      Palpations: Abdomen is soft.      Tenderness: There is no abdominal tenderness.   Musculoskeletal:      Cervical back: Neck supple.   Skin:     General: Skin is dry.   Neurological:      Mental Status: She is alert and oriented to person, place, and time.      GCS: GCS eye subscore is 4. GCS verbal subscore is 5. GCS motor subscore is 6.      Cranial Nerves: No cranial nerve deficit or dysarthria.      Sensory: No sensory deficit.      Motor: No weakness.      Coordination: Romberg sign positive (left). Coordination normal.   Psychiatric:         Mood and Affect: Mood normal.         Behavior:  Behavior normal.                 ED Course  Labs Reviewed   CBC WITH DIFFERENTIAL WITH PLATELET - Abnormal; Notable for the following components:       Result Value    HGB 11.9 (*)     All other components within normal limits   BASIC METABOLIC PANEL (8) - Abnormal; Notable for the following components:    Glucose 120 (*)     All other components within normal limits   TROPONIN I HIGH SENSITIVITY - Normal   SCAN SLIDE                ED Course as of 07/22/25 2324  ------------------------------------------------------------  Time: 07/22 2301  Comment: I independently interpreted labs, unremarkable  ------------------------------------------------------------  Time: 07/22 2301  Comment: Independent interpretation of the CHEST X-RAY shows the trachea is midline, normal cardiac size and contour. No pleural effusions.  No pneumothorax.  No infiltrates or pulmonary edema.    ------------------------------------------------------------  Time: 07/22 2302  Comment: EKG INTERPRETATION  Time: 19:24  Normal sinus rhythm, ventricular rate is 80  Normal axis  Normal OH, QRS, and QTc intervals  No chamber enlargement  Normal ST-T segments  I, the emergency physician, independently interpreted this EKG in the absence of a cardiologist.    ------------------------------------------------------------  Time: 07/22 2319  Comment: Dizziness much improved.  Patient ambulatory without dizziness.  Head turning without dizziness.    Bedside ultrasound does not show retinal detachment.  Advised ophthalmology follow-up for her eye floaters.    Advised neurology/ENT follow-up for vertigo     CTA BRAIN + CTA CAROTIDS (CPT=70496/77868)  Result Date: 7/22/2025  CONCLUSION: 1. Intrarcanial CTA demonstrates no large branch occlusion or aneurysm. 2. CTA of neck demonstrates no significant stenosis or dissection. 3. Please see the body of the report above for further, less significant details. Electronically Verified and Signed by Attending  Radiologist: Spenser Leonardo MD 7/22/2025 10:22 PM Workstation: [id number]    XR CHEST AP PORTABLE  (CPT=71045)  Result Date: 7/22/2025  CONCLUSION: No acute cardiopulmonary process. Please see further details above. Electronically Verified and Signed by Attending Radiologist: Spenser Leonardo MD 7/22/2025 9:34 PM Workstation: [id number]                      [facility]      Differential diagnosis includes Ménière's disease, labyrinthitis, acoustic neuroma, CVA, intracranial mass, brain metastasis, BPPV  No sign of recent stroke on CT, no vascular abnormalities on CTA  Exam most consistent with peripheral vertigo, improving with meclizine, advised neurology follow-up    2.  Differential diagnosis includes retinal detachment, vitreal hemorrhage, glaucoma, macular degeneration  No retinal detachment on bedside ultrasound  Advised ophthalmology follow-up for formal eye examination      Medical Decision Making  Amount and/or Complexity of Data Reviewed  Labs: ordered. Decision-making details documented in ED Course.  Radiology: ordered and independent interpretation performed. Decision-making details documented in ED Course.  ECG/medicine tests: ordered and independent interpretation performed. Decision-making details documented in ED Course.        Disposition and Plan     Clinical Impression:  1. Peripheral vertigo involving left ear    2. Floaters in visual field, right         Disposition:  Discharge  7/22/2025 11:23 pm    Follow-up:  Kumar Lock MD  [address] [address] [phone]    Schedule an appointment as soon as possible for a visit      Hugo Barger MD  [address] [facility] [address]  [facility] [address] [phone]    Follow up      Hugo Fontana MD  [address]  [facility] [address] [phone]    Call  Call to make a sooner appointment for reevaluation of your floater          Medications Prescribed:  Current Discharge Medication List        START taking these medications     Details   Meclizine HCl 50 MG Oral Tab Take 1 tablet (50 mg total) by mouth 3 (three) times daily as needed for Dizziness.  Qty: 15 tablet, Refills: 0                   Supplementary Documentation:

## (undated) DEVICE — SUT COAT VCRL+ 0 27IN CT-1 ABSRB VLT ANTIBACT

## (undated) DEVICE — 40580 - THE PINK PAD - ADVANCED TRENDELENBURG POSITIONING KIT: Brand: 40580 - THE PINK PAD - ADVANCED TRENDELENBURG POSITIONING KIT

## (undated) DEVICE — AGENT HEMSTAT 4X4IN OXIDIZED REGENERATED

## (undated) DEVICE — SUT PDS II 1 96IN TP-1 ABSRB VLT L65MM 1/2

## (undated) DEVICE — INTENDED TO BE USED TO OCCLUDE, RETRACT AND IDENTIFY ARTERIES, VEINS, TENDONS AND NERVES IN SURGICAL PROCEDURES: Brand: STERION®  VESSEL LOOP

## (undated) DEVICE — SUT COAT VCRL 2-0 27IN SH ABSRB UD 26MM 1/2

## (undated) DEVICE — ARTICULATING RELOAD WITH TRI-STAPLE TECHNOLOGY: Brand: ENDO GIA

## (undated) DEVICE — TUBING MEGADYNE SPECULUM

## (undated) DEVICE — STERILE SYNTHETIC POLYISOPRENE POWDER-FREE SURGICAL GLOVES WITH HYDROGEL COATING, SMOOTH FINISH, STRAIGHT FINGER: Brand: PROTEXIS

## (undated) DEVICE — SLEEVE COMPR MD KNEE LEN SGL USE KENDALL SCD

## (undated) DEVICE — HIPEC PACK: Brand: MEDLINE INDUSTRIES, INC.

## (undated) DEVICE — TRAY CATH 16FR F INCL BARDX IC COMPLT CARE

## (undated) DEVICE — POWER SHELL: Brand: SIGNIA

## (undated) DEVICE — GOWN,SIRUS,FABRIC-REINFORCED,X-LARGE: Brand: MEDLINE

## (undated) DEVICE — GLOVE SUR 6.5 SENSICARE PI PIP CRM PWD F

## (undated) DEVICE — CLIP INT L ORNG TI TRNSVRS GRV CHEVRON SHP

## (undated) DEVICE — PAD SACRAL SPAN AID

## (undated) DEVICE — SUT PROL 3-0 36IN SH NABSRB BLU 26MM 1/2 CIR

## (undated) DEVICE — COVER LT HNDL RIG FOR SUR CAM DISP

## (undated) DEVICE — SOLUTION IRRIG 1000ML 0.9% NACL USP BTL

## (undated) DEVICE — SUT PROL 2-0 48IN MH NABSRB BLU L36MM 1/2 CIR

## (undated) DEVICE — SOLUTION IRRIG 500ML 0.9% NACL

## (undated) NOTE — LETTER
[address]  [address]  Authorization for Surgical Operation and Procedure     Date:___________                                                                                                         Time:__________  I hereby authorize Tex Chavarria, my physician and his/her assistants (if applicable), which may include medical students, residents, and/or fellows, to perform the following surgical operation/ procedure and administer such anesthesia as may be determined necessary by my physician:  Operation/Procedure name (s): Total Abdominal Hysterectomy, Bilateral Salpingo-oophorectomy on Rosariotalisha Benitez   2.   I recognize that during the surgical operation/procedure, unforeseen conditions may necessitate additional or different procedures than those listed above.  I, therefore, further authorize and request that the above-named surgeon, assistants, or designees perform such procedures as are, in their judgment, necessary and desirable.    3.   My surgeon/physician has discussed prior to my surgery the potential benefits, risks and side effects of this procedure; the likelihood of achieving goals; and potential problems that might occur during recuperation.  They also discussed reasonable alternatives to the procedure, including risks, benefits, and side effects related to the alternatives and risks related to not receiving this procedure.  I have had all my questions answered and I acknowledge that no guarantee has been made as to the result that may be obtained.    4.   Should the need arise during my operation/procedure, which includes change of level of care prior to discharge, I also consent to the administration of blood and/or blood products.  Further, I understand that despite careful testing and screening of blood or blood products by collecting agencies, I may still be subject to ill effects as a result of receiving a blood transfusion and/or blood products.  The  following are some, but not all, of the potential risks that can occur: fever and allergic reactions, hemolytic reactions, transmission of diseases such as Hepatitis, AIDS and Cytomegalovirus (CMV) and fluid overload.  In the event that I wish to have an autologous transfusion of my own blood, or a directed donor transfusion, I will discuss this with my physician.  Check only if Refusing Blood or Blood Products  I understand refusal of blood or blood products as deemed necessary by my physician may have serious consequences to my condition to include possible death. I hereby assume responsibility for my refusal and release the hospital, its personnel, and my physicians from any responsibility for the consequences of my refusal.          o  Refuse      5.   I authorize the use of any specimen, organs, tissues, body parts or foreign objects that may be removed from my body during the operation/procedure for diagnosis, research or teaching purposes and their subsequent disposal by hospital authorities.  I also authorize the release of specimen test results and/or written reports to my treating physician on the hospital medical staff or other referring or consulting physicians involved in my care, at the discretion of the Pathologist or my treating physician.    6.   I consent to the photographing or videotaping of the operations or procedures to be performed, including appropriate portions of my body for medical, scientific, or educational purposes, provided my identity is not revealed by the pictures or by descriptive texts accompanying them.  If the procedure has been photographed/videotaped, the surgeon will obtain the original picture, image, videotape or CD.  The hospital will not be responsible for storage, release or maintenance of the picture, image, tape or CD.    7.   I consent to the presence of a  or observers in the operating room as deemed necessary by my physician or their designees.     8.   I recognize that in the event my procedure results in extended X-Ray/fluoroscopy time, I may develop a skin reaction.    9. If I have a Do Not Attempt Resuscitation (DNAR) order in place, that status will be suspended while in the operating room, procedural suite, and during the recovery period unless otherwise explicitly stated by me (or a person authorized to consent on my behalf). The surgeon or my attending physician will determine when the applicable recovery period ends for purposes of reinstating the DNAR order.  10. Patients having a sterilization procedure: I understand that if the procedure is successful the results will be permanent and it will therefore be impossible for me to inseminate, conceive, or bear children.  I also understand that the procedure is intended to result in sterility, although the result has not been guaranteed.   11. I acknowledge that my physician has explained sedation/analgesia administration to me including the risk and benefits I consent to the administration of sedation/analgesia as may be necessary or desirable in the judgment of my physician.    I CERTIFY THAT I HAVE READ AND FULLY UNDERSTAND THE ABOVE CONSENT TO OPERATION and/or OTHER PROCEDURE.    _________________________________________  __________________________________  Signature of Patient     Signature of Responsible Person         ___________________________________         Printed Name of Responsible Person           _________________________________                 Relationship to Patient  _________________________________________  ______________________________  Signature of Witness          Date  Time      Patient Name: Rosario Benitez     : 10/2/1960                 Printed: 2024     Medical Record #: [id number]                     Page 1 of [address] [place] [address] [place]  [address]    Consent for Anesthesia    I, Rosario Benitez agree to  be cared for by an anesthesiologist, who is specially trained to monitor me and give me medicine to put me to sleep or keep me comfortable during my procedure    I understand that my anesthesiologist is not an employee or agent of [facility] or [employer]. He or she works for [employer].    As the patient asking for anesthesia services, I agree to:  Allow the anesthesiologist (anesthesia doctor) to give me medicine and do additional procedures as necessary. Some examples are: Starting or using an “IV” to give me medicine, fluids or blood during my procedure, and having a breathing tube placed to help me breathe when I’m asleep (intubation). In the event that my heart stops working properly, I understand that my anesthesiologist will make every effort to sustain my life, unless otherwise directed by [facility] Do Not Resuscitate documents.  Tell my anesthesia doctor before my procedure:  If I am pregnant.  The last time that I ate or drank.  All of the medicines I take (including prescriptions, herbal supplements, and pills I can buy without a prescription (including street drugs/illegal medications). Failure to inform my anesthesiologist about these medicines may increase my risk of anesthetic complications.  If I am allergic to anything or have had a reaction to anesthesia before.  I understand how the anesthesia medicine will help me (benefits).  I understand that with any type of anesthesia medicine there are risks:  The most common risks are: nausea, vomiting, sore throat, muscle soreness, damage to my eyes, mouth, or teeth (from breathing tube placement).  Rare risks include: remembering what happened during my procedure, allergic reactions to medications, injury to my airway, heart, lungs, vision, nerves, or muscles and in extremely rare instances death.  My doctor has explained to me other choices available to me for my care (alternatives).  Pregnant Patients  (“epidural”):  I understand that the risks of having an epidural (medicine given into my back to help control pain during labor), include itching, low blood pressure, difficulty urinating, headache or slowing of the baby’s heart. Very rare risks include infection, bleeding, seizure, irregular heart rhythms and nerve injury.  Regional Anesthesia (“spinal”, “epidural”, & “nerve blocks”):  I understand that rare but potential complications include headache, bleeding, infection, seizure, irregular heart rhythms, and nerve injury.    I can change my mind about having anesthesia services at any time before I get the medicine.    _____________________________________________________________________________  Patient (or Representative) Signature/Relationship to Patient  Date   Time    _____________________________________________________________________________   Name (if used)    Language/Organization   Time    _____________________________________________________________________________  Anesthesiologist Signature     Date   Time  I have discussed the procedure and information above with the patient (or patient’s representative) and answered their questions. The patient or their representative has agreed to have anesthesia services.    _____________________________________________________________________________  Witness        Date   Time  I have verified that the signature is that of the patient or patient’s representative, and that it was signed before the procedure  Patient Name: Rosario Benitez     : 10/2/1960                 Printed: 2024     Medical Record #: [id number]                     Page 2 of 2